# Patient Record
Sex: MALE | Race: WHITE | Employment: FULL TIME | URBAN - METROPOLITAN AREA
[De-identification: names, ages, dates, MRNs, and addresses within clinical notes are randomized per-mention and may not be internally consistent; named-entity substitution may affect disease eponyms.]

---

## 2017-01-04 ENCOUNTER — OFFICE VISIT (OUTPATIENT)
Dept: CARDIOLOGY CLINIC | Age: 57
End: 2017-01-04

## 2017-01-04 VITALS
BODY MASS INDEX: 36.06 KG/M2 | WEIGHT: 281 LBS | SYSTOLIC BLOOD PRESSURE: 102 MMHG | HEART RATE: 64 BPM | HEIGHT: 74 IN | DIASTOLIC BLOOD PRESSURE: 68 MMHG

## 2017-01-04 DIAGNOSIS — Z79.01 CHRONIC ANTICOAGULATION: ICD-10-CM

## 2017-01-04 DIAGNOSIS — I48.0 PAROXYSMAL ATRIAL FIBRILLATION (HCC): Primary | ICD-10-CM

## 2017-01-04 DIAGNOSIS — I49.5 SICK SINUS SYNDROME (HCC): ICD-10-CM

## 2017-01-04 DIAGNOSIS — Z95.0 PACEMAKER: ICD-10-CM

## 2017-01-04 NOTE — MR AVS SNAPSHOT
Visit Information Date & Time Provider Department Dept. Phone Encounter #  
 1/4/2017  9:20 AM Patric Baumgarten, MD [facility] Jett Goldman [phone] Follow-up Instructions Return in about 1 week (around 1/11/2017). Your Appointments 1/23/2017  3:30 PM  
PACEMAKER with PACEMAKER3, AREVALO [facility] (DANIEL SCHEDULING) Appt Note: 2 wk f/u afib ablation ECG please [address] [place]  
[address] [facility] [place]  
  
    
 1/23/2017  4:00 PM  
ESTABLISHED PATIENT with Patric Baumgarten, MD  
[facility] ([address]) Appt Note: 2 wk f/u afib ablation ECG please [address] [place]  
[address] [facility] [address]  
  
    
 3/1/2017 10:15 AM  
PACEMAKER with Arevalo Pipes [facility] (DANIEL SCHEDULING) Appt Note: mdt ppi, rc, thresh, CL auto  
 [address] [place]  
[phone]  
  
    
 6/21/2017 10:00 AM  
ESTABLISHED PATIENT with Claudette Arnold MD  
[facility] ([address]) Appt Note: 6 month per Dr. Ani Mortensen [address] [place] [address]  
[address] [facility] 7 [address] Upcoming Health Maintenance Date Due Hepatitis C Screening 1960 DTaP/Tdap/Td series (1 - Tdap) 6/26/1983 FOBT Q 1 YEAR AGE 50-75 6/26/2012 Allergies as of 1/4/2017  Review Complete On: 1/4/2017 By: Patric Baumgarten, MD  
  
 Severity Noted Reaction Type Reactions Silvadene [Silver Sulfadiazine]  01/30/2012    Swelling Current Immunizations  Reviewed on 7/26/2012 Name Date Influenza Vaccine (Quad) PF 11/23/2016  4:08 PM  
  
 Not reviewed this visit You Were Diagnosed With   
  
 Codes Comments Paroxysmal atrial fibrillation (HCC)    -  Primary ICD-10-CM: I48.0 ICD-9-CM: 427.31 Pacemaker     ICD-10-CM: Z95.0 ICD-9-CM: V45.01 Chronic anticoagulation     ICD-10-CM: Z79.01 
ICD-9-CM: V58.61 Sick sinus syndrome (HCC)     ICD-10-CM: I49.5 ICD-9-CM: 427.81 Vitals BP Pulse Height(growth percentile) Weight(growth percentile) BMI Smoking Status 102/68 (BP 1 Location: Left arm, BP Patient Position: Sitting) 64 6' 2\" (1.88 m) 281 lb (127.5 kg) 36.08 kg/m2 Never Smoker Vitals History BMI and BSA Data Body Mass Index Body Surface Area 36.08 kg/m 2 2.58 m 2 Preferred Pharmacy Pharmacy Name Phone [id number], [address] [facility] [phone] Your Updated Medication List  
  
   
This list is accurate as of: 1/4/17  9:53 AM.  Always use your most recent med list.  
  
  
  
  
 PRADAXA 150 mg capsule Generic drug:  dabigatran etexilate TAKE 1 CAP BY MOUTH EVERY TWELVE (12) HOURS.  
  
 sotalol 120 mg tablet Commonly known as:  Ira Fair Take 1 Tab by mouth every twelve (12) hours. TYLENOL EXTRA STRENGTH 500 mg tablet Generic drug:  acetaminophen Take 1,000 mg by mouth every six (6) hours as needed for Pain. Follow-up Instructions Return in about 1 week (around 1/11/2017). To-Do List   
 01/10/2017 8:15 AM  
  Appointment with [facility] ANESTHESIA; CATH ROOM 3 [facility] at [address] [place] [address] ([phone]) NPO AFTER MIDNIGHT! ROUTINE CASES:  Please arrive 2 hour prior to your scheduled appointment time. If your scheduled appointment is for 0730, 0800, 0815, please arrive by 0645. NON ROUTINE CASES:  PATIENTS WHO REQUIRE LABS, X-RAY, EKG, or MEDS:  PLEASE ARRIVE 3 HOURS PRIOR TO YOUR SCHEDULED APPOINTMENT.   If you require hydration prior to your procedure, PLEASE ARRIVE 4 HOURS PRIOR TO YOUR APPOINTMENT  **** IT IS THE OFFICE [address] NOTIFY THE CATH LAB SCHEDULAR IF THE PATIENT WILL REQUIRE ANY ADDITIONAL TIME FOR PREP FROM ROUTINE CASE ***** Patient Instructions Take all your medications as prescribed. Hold Pradaxa the morning of your procedure. Introducing [facility] SERVICES! Dear Nasrin Bacon: 
Thank you for requesting a [email or web address] account. Our records indicate that you already have an active [email or web address] account. You can access your account anytime at [email or web address] Did you know that you can access your hospital and ER discharge instructions at any time in [email or web address]? You can also review all of your test results from your hospital stay or ER visit. Additional Information If you have questions, please visit the Frequently Asked Questions section of the [email or web address] website at [email or web address] Remember, [email or web address] is NOT to be used for urgent needs. For medical emergencies, dial 911. Now available from your iPhone and Android! Please provide this summary of care documentation to your next provider. Your primary care clinician is listed as José Badillo. If you have any questions after today's visit, please call [phone].

## 2017-01-04 NOTE — PROGRESS NOTES
Cardiac Electrophysiology Office Note     Subjective:      Valentín Bustos is a 49 y.o. male patient who is seen for evaluation of atrial fibrillation. He is here today to discuss A fib ablation s/p hospital discharge. He was started on sotalol after  [facility] 11/21/16 in the hospital when cardizem IV could not control his rate. Since [facility] and d/c from hospital he says he has been doing well. No reported lightheadedness, dizziness or SOB. No blood in the stool or urine on Pradaxa. He felt better being out of AF  He wants to have repeated ablation    He is a patient of Dr Christian Lawrence and I had done pacemaker for sick sinus syndrome before and had done AF ablation for him 4 years ago   Admitted 11/21/16 for chest pain and stress test was normal   Pmhx includes atrial fibrillation with RVR, hx of atrial fibrillation ablation 7/25/2012 PAT, NSVT (5/4/16 on device check), SSS s/p pacemaker (Medtronic dual chamber pacemaker implant 8/17/2012), hx of unsuccessful [facility] 5/2012.      Previous cardiac testing  Echo 2012 LVEF 55 % to 60 %. No RWMA. Ventricular septum: There was a possible very small congenital defect in the perimembranous location. No significant valve abnormalities. Nuclear stress test 2/2012 no ischemia, LVEF 51%    Patient Active Problem List    Diagnosis Date Noted    SVT (supraventricular tachycardia) 11/22/2016    Pacemaker 08/31/2012    Sick sinus syndrome ([address] 08/17/2012    Cough 08/06/2012    Atelectasis 08/06/2012    Other dyspnea and respiratory abnormality 08/06/2012    Sinus bradycardia on ECG 08/06/2012    Dizziness 06/04/2012    Atrial fibrillation ([address] 04/30/2012     Current Outpatient Prescriptions   Medication Sig Dispense Refill    PRADAXA 150 mg capsule TAKE 1 CAP BY MOUTH EVERY TWELVE (12) HOURS. 60 Cap 6    sotalol (BETAPACE) 120 mg tablet Take 1 Tab by mouth every twelve (12) hours.  60 Tab 3    acetaminophen (TYLENOL EXTRA STRENGTH) 500 mg tablet Take 1,000 mg by mouth every six (6) hours as needed for Pain. Allergies   Allergen Reactions    Silvadene [Silver Sulfadiazine] Swelling     Past Medical History   Diagnosis Date    Atrial fibrillation [facility])     Pacemaker      Past Surgical History   Procedure Laterality Date    Hx wisdom teeth extraction       40 yrs ago    Donny echo color flow  7/25/2012          Ep study w/o induction  7/25/2012          Ablation, atrial fibrillation   7/25/2012          Pr intracardiac electrophysiologic 3d mapping  7/25/2012          Pr left heart cath by transeptal puncture  7/25/2012          Echo intracardiac  7/25/2012          Ins ppm/icd led dual only  8/17/2012          Hx pacemaker       Family History   Problem Relation Age of Onset    Hypertension Mother      Social History   Substance Use Topics    Smoking status: Never Smoker    Smokeless tobacco: Never Used    Alcohol use No        Review of Systems:   Constitutional: Negative for fever, chills, weight loss, malaise/fatigue. HEENT: Negative for nosebleeds, vision changes. Respiratory: Negative for cough, hemoptysis, sputum production, and wheezing. Cardiovascular: Negative for chest pain, palpitations, orthopnea, claudication, leg swelling, syncope, and PND. Gastrointestinal: Negative for nausea, vomiting, diarrhea, constipation, blood in stool and melena. Genitourinary: Negative for dysuria, and hematuria. Musculoskeletal: Negative for myalgias, arthralgia. Skin: Negative for rash. Heme: Does not bleed or bruise easily. Neurological: Negative for speech change and focal weakness     Objective:     Visit Vitals    /68 (BP 1 Location: Left arm, BP Patient Position: Sitting)    Pulse 64    Ht 6' 2\" (1.88 m)    Wt 281 lb (127.5 kg)    BMI 36.08 kg/m2      Physical Exam:   Constitutional: well-developed and well-nourished. No distress. Head: Normocephalic and atraumatic. Eyes: Pupils are equal, round  Neck: supple. No JVD present. Cardiovascular: Normal rate, regular rhythm. Exam reveals no gallop and no friction rub. No murmur heard. Pulmonary/Chest: Effort normal and breath sounds normal. No wheezes. left side   Abdominal: Soft, no tenderness. Musculoskeletal: no edema. Brace left ankle   Neurological: alert,oriented. Skin: Skin is warm and dry. Dr Adilia Gray skin   Psychiatric: normal mood and affect. Behavior is normal. Judgment and thought content normal.           Assessment/Plan:       ICD-10-CM ICD-9-CM    1. Paroxysmal atrial fibrillation (HCC) I48.0 427.31 AMB POC EKG ROUTINE W/ 12 LEADS, INTER & REP   2. Pacemaker Z95.0 V45.01    3. Chronic anticoagulation Z79.01 V58.61    4. Sick sinus syndrome ([address] I49.5 427.81      He has so far maintained normal sinus rhythm on sotalol   He had a atrial fibrillation ablation 2012 and did very well afterwards. Lately atrial fibrillation burden has been increasing on ppm and recent hospital admission for A fib with RVR. We had previously discussed a second atrial fibrillation in the hospital and today in the office. He would like to proceed, he will continue sotalol and pradaxa for the procedure. Stop ASA no hx of CAD, normal stress test.   Risks include but are not limited to bleeding in the groin, infection in the groin and/or heart valves, fistula between the groin arteries and veins, valvular damage, diaphragmatic paralysis, aortic dissection, heart attack, stroke, blood clot in the leg, pulmonary embolism, lung collapse (pneumothorax or hemothorax), heart collapse (pericardial tamponade), death. The added risks for left atrial ablation may be atrial-esophageal fistula, pulmonary vein stenoses, kidney failure (from contrast injection), higher risk of bleeding, stroke and heart attack. Elective or emergency surgery may be required to repair some of these complications. Prolonged hospitalization would be required.  General anesthesia and transeptal catheterization may be required for the procedure        Thank you for involving me in this patient's care and please call with further concerns or questions. Shawn Kat M.D.   Electrophysiology/Cardiology  [facility] [place]  [facility] 84, [facility] [address] [place], [place] [address]renetta Bain [address] [place] [address]  [phone]

## 2017-01-04 NOTE — PROGRESS NOTES
Chief Complaint   Patient presents with    Irregular Heart Beat     a-fib    SVT    Follow-up     4 week follow up     Denies chest pain, shortness of breath, and palpitations. Verified medications with the patient.

## 2017-01-06 RX ORDER — SODIUM CHLORIDE 0.9 % (FLUSH) 0.9 %
5-10 SYRINGE (ML) INJECTION AS NEEDED
Status: CANCELLED | OUTPATIENT
Start: 2017-01-06

## 2017-01-06 RX ORDER — SODIUM CHLORIDE 0.9 % (FLUSH) 0.9 %
5-10 SYRINGE (ML) INJECTION EVERY 8 HOURS
Status: CANCELLED | OUTPATIENT
Start: 2017-01-06

## 2017-01-10 ENCOUNTER — ANESTHESIA (OUTPATIENT)
Dept: NON INVASIVE DIAGNOSTICS | Age: 57
End: 2017-01-10
Payer: COMMERCIAL

## 2017-01-10 ENCOUNTER — HOSPITAL ENCOUNTER (OUTPATIENT)
Dept: NON INVASIVE DIAGNOSTICS | Age: 57
Discharge: HOME | End: 2017-01-11
Attending: INTERNAL MEDICINE | Admitting: INTERNAL MEDICINE
Payer: COMMERCIAL

## 2017-01-10 ENCOUNTER — ANESTHESIA EVENT (OUTPATIENT)
Dept: NON INVASIVE DIAGNOSTICS | Age: 57
End: 2017-01-10
Payer: COMMERCIAL

## 2017-01-10 PROBLEM — Z86.79 S/P ABLATION OF ATRIAL FIBRILLATION: Status: ACTIVE | Noted: 2017-01-10

## 2017-01-10 PROBLEM — Z98.890 S/P ABLATION OF ATRIAL FIBRILLATION: Status: ACTIVE | Noted: 2017-01-10

## 2017-01-10 LAB
ABO + RH BLD: NORMAL
ACT BLD: 147 SECS (ref 79–138)
ACT BLD: 234 SECS (ref 79–138)
ACT BLD: 255 SECS (ref 79–138)
ACT BLD: 260 SECS (ref 79–138)
ATRIAL RATE: 70 BPM
BLOOD GROUP ANTIBODIES SERPL: NORMAL
CALCULATED P AXIS, ECG09: 71 DEGREES
CALCULATED R AXIS, ECG10: 23 DEGREES
CALCULATED T AXIS, ECG11: 46 DEGREES
DIAGNOSIS, 93000: NORMAL
P-R INTERVAL, ECG05: 182 MS
Q-T INTERVAL, ECG07: 414 MS
QRS DURATION, ECG06: 88 MS
QTC CALCULATION (BEZET), ECG08: 447 MS
SPECIMEN EXP DATE BLD: NORMAL
VENTRICULAR RATE, ECG03: 70 BPM

## 2017-01-10 PROCEDURE — 93005 ELECTROCARDIOGRAM TRACING: CPT

## 2017-01-10 PROCEDURE — 74011250636 HC RX REV CODE- 250/636: Performed by: INTERNAL MEDICINE

## 2017-01-10 PROCEDURE — C1730 CATH, EP, 19 OR FEW ELECT: HCPCS

## 2017-01-10 PROCEDURE — 77030008684 HC TU ET CUF COVD -B: Performed by: ANESTHESIOLOGY

## 2017-01-10 PROCEDURE — C1894 INTRO/SHEATH, NON-LASER: HCPCS

## 2017-01-10 PROCEDURE — 77030020506 HC NDL TRNSPTL NRG BAYL -F

## 2017-01-10 PROCEDURE — 77030026438 HC STYL ET INTUB CARD -A: Performed by: ANESTHESIOLOGY

## 2017-01-10 PROCEDURE — 74011000258 HC RX REV CODE- 258

## 2017-01-10 PROCEDURE — 93656 COMPRE EP EVAL ABLTJ ATR FIB: CPT

## 2017-01-10 PROCEDURE — 85347 COAGULATION TIME ACTIVATED: CPT

## 2017-01-10 PROCEDURE — 77030030806 HC PTCH ENSIT NAVX STJU -G

## 2017-01-10 PROCEDURE — 76060000037 HC ANESTHESIA 3 TO 3.5 HR

## 2017-01-10 PROCEDURE — 77030033352 HC TBNG IRR PMP COOL PNT STJU -B

## 2017-01-10 PROCEDURE — 77030018729 HC ELECTRD DEFIB PAD CARD -B

## 2017-01-10 PROCEDURE — 77030016899 HC CBL EP EXT4 BSC -B

## 2017-01-10 PROCEDURE — 36415 COLL VENOUS BLD VENIPUNCTURE: CPT | Performed by: INTERNAL MEDICINE

## 2017-01-10 PROCEDURE — 86900 BLOOD TYPING SEROLOGIC ABO: CPT | Performed by: INTERNAL MEDICINE

## 2017-01-10 PROCEDURE — 77030013797 HC KT TRNSDUC PRSSR EDWD -A

## 2017-01-10 PROCEDURE — C1732 CATH, EP, DIAG/ABL, 3D/VECT: HCPCS

## 2017-01-10 PROCEDURE — C1751 CATH, INF, PER/CENT/MIDLINE: HCPCS | Performed by: ANESTHESIOLOGY

## 2017-01-10 PROCEDURE — 77030029065 HC DRSG HEMO QCLOT ZMED -B

## 2017-01-10 PROCEDURE — 77030019908 HC STETH ESOPH SIMS -A: Performed by: ANESTHESIOLOGY

## 2017-01-10 PROCEDURE — 93613 INTRACARDIAC EPHYS 3D MAPG: CPT

## 2017-01-10 PROCEDURE — 74011000250 HC RX REV CODE- 250

## 2017-01-10 PROCEDURE — C1893 INTRO/SHEATH, FIXED,NON-PEEL: HCPCS

## 2017-01-10 PROCEDURE — 74011250637 HC RX REV CODE- 250/637: Performed by: NURSE PRACTITIONER

## 2017-01-10 PROCEDURE — C1731 CATH, EP, 20 OR MORE ELEC: HCPCS

## 2017-01-10 PROCEDURE — 77030020508 HC PD GRND GENRTR BAYL -A

## 2017-01-10 PROCEDURE — 77030016898 HC CBL EP EXT3 STJU -B

## 2017-01-10 PROCEDURE — 77030034850

## 2017-01-10 PROCEDURE — 93662 INTRACARDIAC ECG (ICE): CPT

## 2017-01-10 PROCEDURE — 74011250636 HC RX REV CODE- 250/636

## 2017-01-10 PROCEDURE — C1766 INTRO/SHEATH,STRBLE,NON-PEEL: HCPCS

## 2017-01-10 RX ORDER — SODIUM CHLORIDE 0.9 % (FLUSH) 0.9 %
5-10 SYRINGE (ML) INJECTION AS NEEDED
Status: DISCONTINUED | OUTPATIENT
Start: 2017-01-10 | End: 2017-01-10

## 2017-01-10 RX ORDER — SODIUM CHLORIDE 9 MG/ML
INJECTION, SOLUTION INTRAVENOUS
Status: DISCONTINUED | OUTPATIENT
Start: 2017-01-10 | End: 2017-01-10 | Stop reason: HOSPADM

## 2017-01-10 RX ORDER — SODIUM CHLORIDE 0.9 % (FLUSH) 0.9 %
5-10 SYRINGE (ML) INJECTION EVERY 8 HOURS
Status: DISCONTINUED | OUTPATIENT
Start: 2017-01-10 | End: 2017-01-11 | Stop reason: HOSPADM

## 2017-01-10 RX ORDER — SODIUM CHLORIDE, SODIUM LACTATE, POTASSIUM CHLORIDE, CALCIUM CHLORIDE 600; 310; 30; 20 MG/100ML; MG/100ML; MG/100ML; MG/100ML
INJECTION, SOLUTION INTRAVENOUS
Status: DISCONTINUED | OUTPATIENT
Start: 2017-01-10 | End: 2017-01-10 | Stop reason: HOSPADM

## 2017-01-10 RX ORDER — SODIUM CHLORIDE 9 MG/ML
25 INJECTION, SOLUTION INTRAVENOUS CONTINUOUS
Status: DISCONTINUED | OUTPATIENT
Start: 2017-01-10 | End: 2017-01-10

## 2017-01-10 RX ORDER — HEPARIN SODIUM 200 [USP'U]/100ML
1000 INJECTION, SOLUTION INTRAVENOUS CONTINUOUS
Status: DISCONTINUED | OUTPATIENT
Start: 2017-01-10 | End: 2017-01-10

## 2017-01-10 RX ORDER — SODIUM CHLORIDE 0.9 % (FLUSH) 0.9 %
5 SYRINGE (ML) INJECTION AS NEEDED
Status: DISCONTINUED | OUTPATIENT
Start: 2017-01-10 | End: 2017-01-10 | Stop reason: HOSPADM

## 2017-01-10 RX ORDER — HYDROMORPHONE HYDROCHLORIDE 1 MG/ML
0.2 INJECTION, SOLUTION INTRAMUSCULAR; INTRAVENOUS; SUBCUTANEOUS
Status: DISCONTINUED | OUTPATIENT
Start: 2017-01-10 | End: 2017-01-10

## 2017-01-10 RX ORDER — ROPIVACAINE HYDROCHLORIDE 5 MG/ML
30 INJECTION, SOLUTION EPIDURAL; INFILTRATION; PERINEURAL
Status: DISCONTINUED | OUTPATIENT
Start: 2017-01-10 | End: 2017-01-10

## 2017-01-10 RX ORDER — SODIUM CHLORIDE, SODIUM LACTATE, POTASSIUM CHLORIDE, CALCIUM CHLORIDE 600; 310; 30; 20 MG/100ML; MG/100ML; MG/100ML; MG/100ML
75 INJECTION, SOLUTION INTRAVENOUS CONTINUOUS
Status: DISCONTINUED | OUTPATIENT
Start: 2017-01-10 | End: 2017-01-10

## 2017-01-10 RX ORDER — DABIGATRAN ETEXILATE 150 MG/1
150 CAPSULE ORAL 2 TIMES DAILY
Status: DISCONTINUED | OUTPATIENT
Start: 2017-01-11 | End: 2017-01-11 | Stop reason: HOSPADM

## 2017-01-10 RX ORDER — ADENOSINE 3 MG/ML
6 INJECTION, SOLUTION INTRAVENOUS ONCE
Status: DISCONTINUED | OUTPATIENT
Start: 2017-01-10 | End: 2017-01-10

## 2017-01-10 RX ORDER — DIPHENHYDRAMINE HYDROCHLORIDE 50 MG/ML
12.5 INJECTION, SOLUTION INTRAMUSCULAR; INTRAVENOUS AS NEEDED
Status: DISCONTINUED | OUTPATIENT
Start: 2017-01-10 | End: 2017-01-10

## 2017-01-10 RX ORDER — FENTANYL CITRATE 50 UG/ML
50 INJECTION, SOLUTION INTRAMUSCULAR; INTRAVENOUS AS NEEDED
Status: DISCONTINUED | OUTPATIENT
Start: 2017-01-10 | End: 2017-01-10

## 2017-01-10 RX ORDER — SODIUM CHLORIDE, SODIUM LACTATE, POTASSIUM CHLORIDE, CALCIUM CHLORIDE 600; 310; 30; 20 MG/100ML; MG/100ML; MG/100ML; MG/100ML
125 INJECTION, SOLUTION INTRAVENOUS CONTINUOUS
Status: DISCONTINUED | OUTPATIENT
Start: 2017-01-10 | End: 2017-01-10

## 2017-01-10 RX ORDER — SODIUM CHLORIDE 0.9 % (FLUSH) 0.9 %
5-10 SYRINGE (ML) INJECTION AS NEEDED
Status: DISCONTINUED | OUTPATIENT
Start: 2017-01-10 | End: 2017-01-11 | Stop reason: HOSPADM

## 2017-01-10 RX ORDER — HEPARIN SODIUM 10000 [USP'U]/100ML
18-36 INJECTION, SOLUTION INTRAVENOUS CONTINUOUS
Status: DISCONTINUED | OUTPATIENT
Start: 2017-01-10 | End: 2017-01-10

## 2017-01-10 RX ORDER — HYDROCODONE BITARTRATE AND ACETAMINOPHEN 5; 325 MG/1; MG/1
1 TABLET ORAL
Status: DISCONTINUED | OUTPATIENT
Start: 2017-01-10 | End: 2017-01-11 | Stop reason: HOSPADM

## 2017-01-10 RX ORDER — LIDOCAINE HYDROCHLORIDE 10 MG/ML
0.1 INJECTION, SOLUTION EPIDURAL; INFILTRATION; INTRACAUDAL; PERINEURAL AS NEEDED
Status: DISCONTINUED | OUTPATIENT
Start: 2017-01-10 | End: 2017-01-10

## 2017-01-10 RX ORDER — MIDAZOLAM HYDROCHLORIDE 1 MG/ML
0.5 INJECTION, SOLUTION INTRAMUSCULAR; INTRAVENOUS
Status: DISCONTINUED | OUTPATIENT
Start: 2017-01-10 | End: 2017-01-10

## 2017-01-10 RX ORDER — ACETAMINOPHEN 500 MG
500 TABLET ORAL
Status: DISCONTINUED | OUTPATIENT
Start: 2017-01-10 | End: 2017-01-10

## 2017-01-10 RX ORDER — NALOXONE HYDROCHLORIDE 0.4 MG/ML
0.4 INJECTION, SOLUTION INTRAMUSCULAR; INTRAVENOUS; SUBCUTANEOUS AS NEEDED
Status: DISCONTINUED | OUTPATIENT
Start: 2017-01-10 | End: 2017-01-11 | Stop reason: HOSPADM

## 2017-01-10 RX ORDER — SODIUM CHLORIDE 0.9 % (FLUSH) 0.9 %
5-10 SYRINGE (ML) INJECTION AS NEEDED
Status: DISCONTINUED | OUTPATIENT
Start: 2017-01-10 | End: 2017-01-10 | Stop reason: HOSPADM

## 2017-01-10 RX ORDER — SODIUM CHLORIDE 0.9 % (FLUSH) 0.9 %
5-10 SYRINGE (ML) INJECTION EVERY 8 HOURS
Status: DISCONTINUED | OUTPATIENT
Start: 2017-01-10 | End: 2017-01-10 | Stop reason: HOSPADM

## 2017-01-10 RX ORDER — FENTANYL CITRATE 50 UG/ML
25 INJECTION, SOLUTION INTRAMUSCULAR; INTRAVENOUS
Status: DISCONTINUED | OUTPATIENT
Start: 2017-01-10 | End: 2017-01-10

## 2017-01-10 RX ORDER — MIDAZOLAM HYDROCHLORIDE 1 MG/ML
1 INJECTION, SOLUTION INTRAMUSCULAR; INTRAVENOUS AS NEEDED
Status: DISCONTINUED | OUTPATIENT
Start: 2017-01-10 | End: 2017-01-10

## 2017-01-10 RX ORDER — PROPOFOL 10 MG/ML
INJECTION, EMULSION INTRAVENOUS AS NEEDED
Status: DISCONTINUED | OUTPATIENT
Start: 2017-01-10 | End: 2017-01-10 | Stop reason: HOSPADM

## 2017-01-10 RX ORDER — ACETAMINOPHEN 325 MG/1
650 TABLET ORAL
Status: DISCONTINUED | OUTPATIENT
Start: 2017-01-10 | End: 2017-01-11 | Stop reason: HOSPADM

## 2017-01-10 RX ORDER — SUCCINYLCHOLINE CHLORIDE 20 MG/ML
INJECTION INTRAMUSCULAR; INTRAVENOUS AS NEEDED
Status: DISCONTINUED | OUTPATIENT
Start: 2017-01-10 | End: 2017-01-10 | Stop reason: HOSPADM

## 2017-01-10 RX ORDER — HEPARIN SODIUM 1000 [USP'U]/ML
5000 INJECTION, SOLUTION INTRAVENOUS; SUBCUTANEOUS
Status: DISCONTINUED | OUTPATIENT
Start: 2017-01-10 | End: 2017-01-10

## 2017-01-10 RX ORDER — SOTALOL HYDROCHLORIDE 80 MG/1
120 TABLET ORAL EVERY 12 HOURS
Status: DISCONTINUED | OUTPATIENT
Start: 2017-01-10 | End: 2017-01-11 | Stop reason: HOSPADM

## 2017-01-10 RX ORDER — SODIUM CHLORIDE 0.9 % (FLUSH) 0.9 %
5-10 SYRINGE (ML) INJECTION EVERY 8 HOURS
Status: DISCONTINUED | OUTPATIENT
Start: 2017-01-10 | End: 2017-01-10

## 2017-01-10 RX ORDER — HEPARIN SODIUM 1000 [USP'U]/ML
INJECTION, SOLUTION INTRAVENOUS; SUBCUTANEOUS AS NEEDED
Status: DISCONTINUED | OUTPATIENT
Start: 2017-01-10 | End: 2017-01-10 | Stop reason: HOSPADM

## 2017-01-10 RX ORDER — ROCURONIUM BROMIDE 10 MG/ML
INJECTION, SOLUTION INTRAVENOUS AS NEEDED
Status: DISCONTINUED | OUTPATIENT
Start: 2017-01-10 | End: 2017-01-10 | Stop reason: HOSPADM

## 2017-01-10 RX ORDER — MORPHINE SULFATE 10 MG/ML
2 INJECTION, SOLUTION INTRAMUSCULAR; INTRAVENOUS
Status: DISCONTINUED | OUTPATIENT
Start: 2017-01-10 | End: 2017-01-10

## 2017-01-10 RX ORDER — SODIUM CHLORIDE 9 MG/ML
INJECTION, SOLUTION INTRAVENOUS
Status: DISCONTINUED
Start: 2017-01-10 | End: 2017-01-10

## 2017-01-10 RX ORDER — ATROPINE SULFATE 0.1 MG/ML
0.5 INJECTION INTRAVENOUS AS NEEDED
Status: DISCONTINUED | OUTPATIENT
Start: 2017-01-10 | End: 2017-01-10

## 2017-01-10 RX ADMIN — PROPOFOL 300 MG: 10 INJECTION, EMULSION INTRAVENOUS at 08:18

## 2017-01-10 RX ADMIN — SODIUM CHLORIDE: 9 INJECTION, SOLUTION INTRAVENOUS at 08:10

## 2017-01-10 RX ADMIN — SUCCINYLCHOLINE CHLORIDE 180 MG: 20 INJECTION INTRAMUSCULAR; INTRAVENOUS at 08:18

## 2017-01-10 RX ADMIN — HEPARIN SODIUM 13000 UNITS: 1000 INJECTION, SOLUTION INTRAVENOUS; SUBCUTANEOUS at 09:28

## 2017-01-10 RX ADMIN — ROCURONIUM BROMIDE 10 MG: 10 INJECTION, SOLUTION INTRAVENOUS at 08:18

## 2017-01-10 RX ADMIN — Medication 10 ML: at 14:41

## 2017-01-10 RX ADMIN — HEPARIN SODIUM 2000 UNITS: 1000 INJECTION, SOLUTION INTRAVENOUS; SUBCUTANEOUS at 10:25

## 2017-01-10 RX ADMIN — HEPARIN SODIUM 18 UNITS/KG/HR: 10000 INJECTION, SOLUTION INTRAVENOUS at 09:36

## 2017-01-10 RX ADMIN — SODIUM CHLORIDE, SODIUM LACTATE, POTASSIUM CHLORIDE, CALCIUM CHLORIDE: 600; 310; 30; 20 INJECTION, SOLUTION INTRAVENOUS at 08:14

## 2017-01-10 RX ADMIN — SOTALOL HYDROCHLORIDE 120 MG: 80 TABLET ORAL at 20:46

## 2017-01-10 RX ADMIN — PROPOFOL 50 MG: 10 INJECTION, EMULSION INTRAVENOUS at 11:23

## 2017-01-10 NOTE — PROCEDURES
Cardiac Procedure Note   Patient: Jazz Dyer  MRN: [id number]  SSN: [id number]   YOB: 1960 Age: 49 y.o. Sex: male    Date of Procedure: 1/10/2017   Pre-procedure Diagnosis: PAF post ablation of persistent AF in 2012 and cardioversion of PAF in 11/2016. Sick sinus syndrome, medtronic pacemaker  Post-procedure Diagnosis: same  Procedure: EP Study and Ablation PVI  :  Dr. Varun Crum MD    Assistant(s):  None  Anesthesia: general anesthesia  Estimated Blood Loss: Less than 10 mL from groins  Specimens Removed: None  Findings: all 4 pulmonary veins remain isolated with exit and blocks but there are viable tissues at the angel between the left inferior, superior vein and SALLY and inferior lips of left inferior PV anteriorly and posteriorly so ablations were done here  There is a small viable tissue in the anterior right superior PV and this was ablated  He had CFAE ablation in 2012.   There are fractionated EGMs at the base of SALLY and this was also ablated  Complications: None   Implants:  None  Dual chamber Medtronic pacer was checked with pacing threshold 0.5 V @ 0.4 ms and programmed to MVP R 70 -130 bpm  Signed by:  Varun Crum MD  1/10/2017  12:38 PM

## 2017-01-10 NOTE — PROGRESS NOTES
Occupational Therapy Screening:  Services are not indicated at this time. [facility] triggered for occupational therapy based on results obtained during the nursing admission assessment. The patients chart was reviewed and the patient is not appropriate for a skilled therapy evaluation at this time. Please consult occupational therapy if any therapy needs arise. Thank you.     Kylie Newell OT

## 2017-01-10 NOTE — ANESTHESIA POSTPROCEDURE EVALUATION
Post-Anesthesia Evaluation and Assessment    Patient: Celi Aguirre MRN: [id number]  SSN: [id number]    YOB: 1960  Age: 49 y.o. Sex: male       Cardiovascular Function/Vital Signs  Visit Vitals    /87    Pulse 71    Temp 36.6 °C (97.8 °F)    Resp 16    Ht 6' 2\" (1.88 m)    Wt 129.7 kg (286 lb)    SpO2 100%    BMI 36.72 kg/m2       Patient is status post general anesthesia for * No procedures listed *. Nausea/Vomiting: None    Postoperative hydration reviewed and adequate. Pain:  Pain Scale 1: Numeric (0 - 10) (01/10/17 1155)  Pain Intensity 1: 0 (01/10/17 1155)   Managed    Neurological Status:   Neuro  Neurologic State: Drowsy (01/10/17 1155)   At baseline    Mental Status and Level of Consciousness: Arousable    Pulmonary Status:   O2 Device: Room air (01/10/17 1201)   Adequate oxygenation and airway patent    Complications related to anesthesia: None    Post-anesthesia assessment completed.  No concerns    Signed By: Marylen Lat, MD     January 10, 2017

## 2017-01-10 NOTE — PROGRESS NOTES
Cardiac Cath Lab Recovery Arrival Note:      Margret Childers arrived to Cardiac Cath Lab, Recovery Area. Staff introduced to patient. Patient identifiers verified with NAME and DATE OF BIRTH. Procedure verified with patient. Consent forms reviewed and signed by patient or authorized representative and verified. Allergies verified. Patient informed of procedure and plan of care. Questions answered with review. Patient prepped for procedure, per orders from physician, prior to arrival.    Patient on cardiac monitor, non-invasive blood pressure, SPO2 monitor. Patient is A&Ox 4. Patient reports no pain, no chest pain, no n/v. Patient in stretcher, in low position, with side rails up, call bell within reach, patient instructed to call of assistance as needed. Patient prep in: [facility] Recovery Area, Bed# 6. Family in: Brother: Arlene Sy .    Prep by: Ravinder Haas RN

## 2017-01-10 NOTE — INTERVAL H&P NOTE
H&P Update:  Brain Emilee was seen and examined. History and physical has been reviewed. The patient has been examined.  There have been no significant clinical changes since the completion of the originally dated History and Physical.    Signed By: Ofelia Reinoso MD     January 10, 2017 7:59 AM

## 2017-01-10 NOTE — PROGRESS NOTES
Cardiac Cath Lab Procedure Area Arrival Note:    Junito Poole arrived to Cardiac Cath Lab, Procedure Area. Patient identifiers verified with NAME and DATE OF BIRTH. Procedure verified with patient. Consent forms verified. Allergies verified. Patient informed of procedure and plan of care. Questions answered with review. Patient voiced understanding of procedure and plan of care. Patient on cardiac monitor, non-invasive blood pressure, SPO2 monitor. On roomair or O2 @ 2 lpm via NC.  IV of 0.9NS on pump at 25 ml/hr. Patient status doing well without problems. Patient is A&Ox 3. Patient reports no discomfort. Patient medicated during procedure with orders obtained and verified by Dr. Kurt Johnston. Refer to patients Cardiac Cath Lab PROCEDURE REPORT for vital signs, assessment, status, and response during procedure, printed at end of case. Printed report on chart or scanned into chart.

## 2017-01-10 NOTE — H&P (VIEW-ONLY)
Cardiac Electrophysiology Office Note     Subjective:      Junito Poole is a 49 y.o. male patient who is seen for evaluation of atrial fibrillation. He is here today to discuss A fib ablation s/p hospital discharge. He was started on sotalol after  [facility] 11/21/16 in the hospital when cardizem IV could not control his rate. Since [facility] and d/c from hospital he says he has been doing well. No reported lightheadedness, dizziness or SOB. No blood in the stool or urine on Pradaxa. He felt better being out of AF  He wants to have repeated ablation    He is a patient of Dr Maggi Reeder and I had done pacemaker for sick sinus syndrome before and had done AF ablation for him 4 years ago   Admitted 11/21/16 for chest pain and stress test was normal   Pmhx includes atrial fibrillation with RVR, hx of atrial fibrillation ablation 7/25/2012 PAT, NSVT (5/4/16 on device check), SSS s/p pacemaker (Medtronic dual chamber pacemaker implant 8/17/2012), hx of unsuccessful [facility] 5/2012.      Previous cardiac testing  Echo 2012 LVEF 55 % to 60 %. No RWMA. Ventricular septum: There was a possible very small congenital defect in the perimembranous location. No significant valve abnormalities. Nuclear stress test 2/2012 no ischemia, LVEF 51%    Patient Active Problem List    Diagnosis Date Noted    SVT (supraventricular tachycardia) 11/22/2016    Pacemaker 08/31/2012    Sick sinus syndrome ([address] 08/17/2012    Cough 08/06/2012    Atelectasis 08/06/2012    Other dyspnea and respiratory abnormality 08/06/2012    Sinus bradycardia on ECG 08/06/2012    Dizziness 06/04/2012    Atrial fibrillation ([address] 04/30/2012     Current Outpatient Prescriptions   Medication Sig Dispense Refill    PRADAXA 150 mg capsule TAKE 1 CAP BY MOUTH EVERY TWELVE (12) HOURS. 60 Cap 6    sotalol (BETAPACE) 120 mg tablet Take 1 Tab by mouth every twelve (12) hours.  60 Tab 3    acetaminophen (TYLENOL EXTRA STRENGTH) 500 mg tablet Take 1,000 mg by mouth every six (6) hours as needed for Pain. Allergies   Allergen Reactions    Silvadene [Silver Sulfadiazine] Swelling     Past Medical History   Diagnosis Date    Atrial fibrillation [facility])     Pacemaker      Past Surgical History   Procedure Laterality Date    Hx wisdom teeth extraction       40 yrs ago    Donny echo color flow  7/25/2012          Ep study w/o induction  7/25/2012          Ablation, atrial fibrillation   7/25/2012          Pr intracardiac electrophysiologic 3d mapping  7/25/2012          Pr left heart cath by transeptal puncture  7/25/2012          Echo intracardiac  7/25/2012          Ins ppm/icd led dual only  8/17/2012          Hx pacemaker       Family History   Problem Relation Age of Onset    Hypertension Mother      Social History   Substance Use Topics    Smoking status: Never Smoker    Smokeless tobacco: Never Used    Alcohol use No        Review of Systems:   Constitutional: Negative for fever, chills, weight loss, malaise/fatigue. HEENT: Negative for nosebleeds, vision changes. Respiratory: Negative for cough, hemoptysis, sputum production, and wheezing. Cardiovascular: Negative for chest pain, palpitations, orthopnea, claudication, leg swelling, syncope, and PND. Gastrointestinal: Negative for nausea, vomiting, diarrhea, constipation, blood in stool and melena. Genitourinary: Negative for dysuria, and hematuria. Musculoskeletal: Negative for myalgias, arthralgia. Skin: Negative for rash. Heme: Does not bleed or bruise easily. Neurological: Negative for speech change and focal weakness     Objective:     Visit Vitals    /68 (BP 1 Location: Left arm, BP Patient Position: Sitting)    Pulse 64    Ht 6' 2\" (1.88 m)    Wt 281 lb (127.5 kg)    BMI 36.08 kg/m2      Physical Exam:   Constitutional: well-developed and well-nourished. No distress. Head: Normocephalic and atraumatic. Eyes: Pupils are equal, round  Neck: supple. No JVD present. Cardiovascular: Normal rate, regular rhythm. Exam reveals no gallop and no friction rub. No murmur heard. Pulmonary/Chest: Effort normal and breath sounds normal. No wheezes. left side   Abdominal: Soft, no tenderness. Musculoskeletal: no edema. Brace left ankle   Neurological: alert,oriented. Skin: Skin is warm and dry. Dr Oswaldo Kyle skin   Psychiatric: normal mood and affect. Behavior is normal. Judgment and thought content normal.           Assessment/Plan:       ICD-10-CM ICD-9-CM    1. Paroxysmal atrial fibrillation (HCC) I48.0 427.31 AMB POC EKG ROUTINE W/ 12 LEADS, INTER & REP   2. Pacemaker Z95.0 V45.01    3. Chronic anticoagulation Z79.01 V58.61    4. Sick sinus syndrome ([address] I49.5 427.81      He has so far maintained normal sinus rhythm on sotalol   He had a atrial fibrillation ablation 2012 and did very well afterwards. Lately atrial fibrillation burden has been increasing on ppm and recent hospital admission for A fib with RVR. We had previously discussed a second atrial fibrillation in the hospital and today in the office. He would like to proceed, he will continue sotalol and pradaxa for the procedure. Stop ASA no hx of CAD, normal stress test.   Risks include but are not limited to bleeding in the groin, infection in the groin and/or heart valves, fistula between the groin arteries and veins, valvular damage, diaphragmatic paralysis, aortic dissection, heart attack, stroke, blood clot in the leg, pulmonary embolism, lung collapse (pneumothorax or hemothorax), heart collapse (pericardial tamponade), death. The added risks for left atrial ablation may be atrial-esophageal fistula, pulmonary vein stenoses, kidney failure (from contrast injection), higher risk of bleeding, stroke and heart attack. Elective or emergency surgery may be required to repair some of these complications. Prolonged hospitalization would be required.  General anesthesia and transeptal catheterization may be required for the procedure        Thank you for involving me in this patient's care and please call with further concerns or questions. Rebecca Brady M.D.   Electrophysiology/Cardiology  [facility] [place]  [facility] 84, [facility] [address] [place] [address] [place] [address] [place] [address]  [phone]

## 2017-01-10 NOTE — ANESTHESIA PROCEDURE NOTES
Arterial Line Placement    Start time: 1/10/2017 8:21 AM  End time: 1/10/2017 8:24 AM  Performed by: Cindi Cruz  Authorized by: Cindi Cruz     Pre-Procedure  Indications:   Other (comment) and arterial pressure monitoring  Preanesthetic Checklist: patient identified, risks and benefits discussed, anesthesia consent, site marked, patient being monitored, timeout performed and patient being monitored    Timeout Time: 08:21        Procedure:   Prep:  Chlorhexidine  Seldinger Technique?: Yes    Orientation:  Right  Location:  Radial artery  Catheter size:  20 G  Number of attempts:  1  Cont Cardiac Output Sensor: No      Assessment:   Post-procedure:  Line secured and sterile dressing applied  Patient Tolerance:  Patient tolerated the procedure well with no immediate complications

## 2017-01-10 NOTE — PROGRESS NOTES
1350 TRANSFER - IN REPORT:    Verbal report received from Colleen Wells [facility] (name) on Jose Kramer  being received from CCL (unit) for routine progression of care      Report consisted of patients Situation, Background, Assessment and   Recommendations(SBAR). Information from the following report(s) SBAR, Kardex, Procedure Summary, Intake/Output, MAR and Recent Results was reviewed with the receiving nurse. Opportunity for questions and clarification was provided. Assessment completed upon patients arrival to unit and care assumed. 1425 Pt arrived to [facility]. Tele placed and verified with MT.  VSS, assessment complete, R and L groin sites, and R radial site s/p ablation no bleeding, no hematoma, dry/intact. Pt with out c/o pain. 1930 Bedside shift change report given to Chitra Baker RN (oncoming nurse) by Isha Augustine RN (offgoing nurse). Report included the following information SBAR, Kardex, Procedure Summary, Intake/Output, MAR, Recent Results and Med Rec Status.

## 2017-01-10 NOTE — PROGRESS NOTES
TRANSFER - IN REPORT:    Verbal report received from ANA Romeo on 5726 Alessandra Burns, Procedure EPS/Ablation , from the Cardiac Cath lab, for routine progression of care. Report consisted of patients Situation, Background, Assessment and Recommendations(SBAR). Information from the following report(s) Procedure Summary, MAR and Recent Results was reviewed with the receiving clinician. Opportunity for questions and clarification was provided. Assessment completed upon patients arrival to [address] [place] [address] and care [facility]. Cardiac Cath Lab Recovery Arrival Note:     57Rachana Burns arrived to [facility] recovery area. Patient procedure= EPS/Ablation. Patient on cardiac monitor, non-invasive blood pressure, Patient status doing well without problems. Patient is Arousablex4. Patient reports no pain, no chest pain. Procedure site without any bleeding and no hematoma.

## 2017-01-10 NOTE — IP AVS SNAPSHOT
[address] 
[phone] Patient: Maury Jane MRN: [id number] :1960 You are allergic to the following Allergen Reactions Silvadene (Silver Sulfadiazine) Swelling Recent Documentation Height Weight BMI Smoking Status 1.88 m 129.3 kg 36.6 kg/m2 Never Smoker Emergency Contacts Name Discharge Info Relation Home Work Mobile [address] [facility] CAREGIVER [3] Mother [14] [phone] About your hospitalization You were admitted on:  January 10, 2017 You last received care in the:  [facility] 4 CV SERVICES UNIT You were discharged on:  2017 Unit phone number:  [phone] Why you were hospitalized Your primary diagnosis was:  S/P Ablation Of Atrial Fibrillation Your diagnoses also included:  Paf (Paroxysmal Atrial Fibrillation) (Hcc), Sick Sinus Syndrome (Hcc) Providers Seen During Your Hospitalizations Provider Role Specialty Primary office phone Kaitlynn Gonzalez MD Attending Provider Cardiology [phone] Your Primary Care Physician (PCP) Primary Care Physician Office Phone Office Fax Kiera Kylee [phone] Follow-up Information Follow up With Details Comments Contact Info Kaitlynn Gonzalez MD On 2017 [facility] [address] [place] 
[phone] Kyle Harden MD   [address] [place] [address] [address] [address] [phone] Your Appointments 2017  3:30 PM EST  
PACEMAKER with PACEMAKER3, Alok Negrete [facility] ([facility]EL [facility]) [address] [place]  
[phone] 2017  4:00 PM EST  
ESTABLISHED PATIENT with Kaitlynn Gonzalez MD  
[facility] ([address] [place] [address]) [address] [facility] [address] [phone] Current Discharge Medication List  
  
CONTINUE these medications which have NOT CHANGED Dose & Instructions Dispensing Information Comments Morning Noon Evening Bedtime PRADAXA 150 mg capsule Generic drug:  dabigatran etexilate Your next dose is: Today, Tomorrow Other:  _________ TAKE 1 CAP BY MOUTH EVERY TWELVE (12) HOURS. Quantity:  60 Cap Refills:  6  
     
   
   
   
  
 sotalol 120 mg tablet Commonly known as:  Yasmine Ricks Your next dose is: Today, Tomorrow Other:  _________ Dose:  120 mg Take 1 Tab by mouth every twelve (12) hours. Quantity:  60 Tab Refills:  3  
     
   
   
   
  
 TYLENOL EXTRA STRENGTH 500 mg tablet Generic drug:  acetaminophen Your next dose is: Today, Tomorrow Other:  _________ Dose:  1000 mg Take 1,000 mg by mouth every six (6) hours as needed for Pain. Refills:  0 Discharge Instructions Patient Instructions Post-EP study and ablation 1. No heavy lifting or exercises for 1 week. This includes the following: Do not push or move furniture, jog or run 2. Do not drive for 2 days. 3.  Call Dr. Edilberto Arreguin at [phone] if you experience any of the following symptoms: 
Dizziness, lightheadedness, fainting spells Lack of energy Shortness of breath Rapid heart rate Chest or muscle twitches Blurry vision, double vision, weakness, numbness Nausea, vomiting Fever Bleeding in the stool, black stool Leg swelling, pain 4. Follow-up with Dr. Edilberto Arreguin Future Appointments Date Time Provider Jam Echevarria 1/23/2017 3:30 PM PACEMAKER3, [address]  
1/23/2017 4:00 PM Eddie Gtz  [address]  
3/1/2017 10:15 AM PACEMAKER3, [address]  
6/21/2017 10:00 AM Gloria Sanchez  [address] Discharge Orders None [facility] Announcement We are excited to announce that we are making your provider's discharge notes available to you in Va[employer]. You will see these notes when they are completed and signed by the physician that discharged you from your recent hospital stay. If you have any questions or concerns about any information you see in Va[employer], please call the Health Information Department where you were seen or reach out to your Primary Care Provider for more information about your plan of care. Introducing [facility]! Dear Mona Díaz: 
Thank you for requesting a Vaioni account. Our records indicate that you already have an active Va[employer] account. You can access your account anytime at [email or web address]. [employer] Did you know that you can access your hospital and ER discharge instructions at any time in Vaioni? You can also review all of your test results from your hospital stay or ER visit. Additional Information If you have questions, please visit the Frequently Asked Questions section of the Va[employer] website at [email or web address] [employer]/. Remember, Vaioni is NOT to be used for urgent needs. For medical emergencies, dial 911. Now available from your iPhone and Android! General Information Please provide this summary of care documentation to your next provider. Patient Signature:  ____________________________________________________________ Date:  ____________________________________________________________  
  
Aloma Snellen Provider Signature:  ____________________________________________________________ Date:  ____________________________________________________________

## 2017-01-10 NOTE — PROGRESS NOTES
TRANSFER - OUT REPORT:    Verbal report given to Will on Raven Campbell being transferred to holding for routine progression of care       Report consisted of patients Situation, Background, Assessment and   Recommendations(SBAR). Information from the following report(s) SBAR, Procedure Summary and MAR was reviewed with the receiving nurse. Opportunity for questions and clarification was provided.

## 2017-01-10 NOTE — ANESTHESIA PREPROCEDURE EVALUATION
Anesthetic History   No history of anesthetic complications            Review of Systems / Medical History  Patient summary reviewed, nursing notes reviewed and pertinent labs reviewed    Pulmonary  Within defined limits                 Neuro/Psych   Within defined limits           Cardiovascular            Dysrhythmias : atrial fibrillation           GI/Hepatic/Renal  Within defined limits              Endo/Other        Obesity  Pertinent negatives: No morbid obesity   Other Findings              Physical Exam    Airway  Mallampati: II  TM Distance: > 6 cm  Neck ROM: normal range of motion   Mouth opening: Normal     Cardiovascular  Regular rate and rhythm,  S1 and S2 normal,  no murmur, click, rub, or gallop             Dental  No notable dental hx       Pulmonary  Breath sounds clear to auscultation               Abdominal  GI exam deferred       Other Findings            Anesthetic Plan    ASA: 3  Anesthesia type: general          Induction: Intravenous  Anesthetic plan and risks discussed with: Patient

## 2017-01-11 VITALS
TEMPERATURE: 98.5 F | HEIGHT: 74 IN | HEART RATE: 71 BPM | DIASTOLIC BLOOD PRESSURE: 76 MMHG | OXYGEN SATURATION: 99 % | WEIGHT: 285.06 LBS | SYSTOLIC BLOOD PRESSURE: 131 MMHG | RESPIRATION RATE: 16 BRPM | BODY MASS INDEX: 36.58 KG/M2

## 2017-01-11 LAB — MAGNESIUM SERPL-MCNC: 2.1 MG/DL (ref 1.6–2.4)

## 2017-01-11 PROCEDURE — 74011250637 HC RX REV CODE- 250/637: Performed by: NURSE PRACTITIONER

## 2017-01-11 PROCEDURE — 83735 ASSAY OF MAGNESIUM: CPT | Performed by: INTERNAL MEDICINE

## 2017-01-11 PROCEDURE — 36415 COLL VENOUS BLD VENIPUNCTURE: CPT | Performed by: INTERNAL MEDICINE

## 2017-01-11 RX ADMIN — DABIGATRAN ETEXILATE MESYLATE 150 MG: 150 CAPSULE ORAL at 08:27

## 2017-01-11 RX ADMIN — SOTALOL HYDROCHLORIDE 120 MG: 80 TABLET ORAL at 08:26

## 2017-01-11 RX ADMIN — Medication 10 ML: at 06:28

## 2017-01-11 NOTE — PROGRESS NOTES
Problem: Falls - Risk of  Goal: *Absence of falls  Outcome: Progressing Towards Goal  Pt bedside table and call bell within reach. Pt aware to call for assistance as needed. Pt wearing gripper socks. Problem: Cath Lab Procedures: Post-Cath Day of Procedure (Initiate SCIP Measures for Post-Op Care)  Goal: Activity/Safety  Outcome: Progressing Towards Goal  Pt up ad brynn, ambulating in hallway. Goal: Psychosocial  Outcome: Progressing Towards Goal  Pt calm, cooperative, and pleasant. 0730: Bedside and Verbal shift change report given to Olive Crump (oncoming nurse) by Asha Contreras (offgoing nurse). Report included the following information SBAR, Kardex, ED Summary, Procedure Summary, Intake/Output, MAR, Recent Results and Cardiac Rhythm NSR/Paced.

## 2017-01-11 NOTE — PROCEDURES
DATE OF PROCEDURE:  1/10/2017    PROCEDURES  1. Comprehensive Electrophysiology study including CS/LA pacing  2. Ablations of paroxysmal atrial fibrillation by pulmonary vein isolations. 3. Intracardiac echocardiogram was utilized and transseptal catheterization were performed. 4. Fluoroscopy was also used along with 3-dimensional electroanatomical [facility] Velocity mapping. 5. Programming and reprogramming pacemaker post procedure    BRIEF HISTORY: This is a 49 y. o.man who had a history of paroxysmal atrial fibrillation with symptoms of palpitations. The patient does not have adequate symptom control with medical therapy, sotalol. He had persistent atrial fibrillation ablation in 2012 by PVI and complex fractionated atrial electrogram ablation method. He has underlying sick sinus syndrome and has dual chamber pacemaker. He is here for the atrial fibrillation ablation. The risks and benefits the procedure have been reviewed with him on several occasions and also with his brother, and they agreed to proceed. He is in sinus rhythm on presentation and also took last dose pradaxa last evening so no CHRISTEL was warranted. PROCEDURE IN DETAIL. After the informed written consent had been obtained, the patient was brought to the electrophysiology suite, where he was prepped and draped in usual sterile fashion. He had undergone general anesthesia. His blood pressure was low with the general anesthetic agent, and he was supported with Nilson-Synephrine. Access was obtained into the left and right femoral veins on a total of 4 occasions. Over the guidewires, a 6-Equatorial Guinean and a 9-Equatorial [place]inean introducer were inserted into the left femoral vein, and an 8 and5 F sheaths were inserted into the right femoral vein. The decapolar Bard diagnostic catheter was then advanced under fluoroscopy and placed in the coronary sinus for pacing and recording.  The ablation catheter was positioned at the HIS bundle and in the right ventricle for recording and pacing. He has right atrial pacing lead so no catheter was placed for pacing here. The intracardiac echocardiogram used was advanced under fluoroscopy into the mid-right atrium. The echocardiogram catheter is AcuNav. The cardiac structure was examined, and the patient does not have left atrial appendage thrombus. The interatrial septum was brought in view, and at this time, he was given intravenous heparin bolus and the [facility] RF Transseptal Catheterization System was then advanced over one of the long guidewire and then Agilis sheath into the SVC. The system was then pulled back under fluoroscopy and also with the intracardiac echocardiogram. Once the tenting of fossa ovalis was observed, the needle was then advanced into the left atrium. The saline injection was delivered via the needle and showed entry into the left atrium, and the needle was pulled back while the dilator and the long sheath was then advanced inside the left atrium. The dilator was removed along with the needle at the end. The sheath was then flushed with the pressurized heparinized saline fluid. Then, the 3-dimensional electroanatomical map of the left atrium was then obtained using the Spiral multielectrode circumferential [employer]. Sanket catheter. The pulmonary veins were obtained and matched up with the cardiac CT images that had been obtained in the past. The patient has 4 discrete pulmonary veins: Two on the right and two on the left. The mapping ablation catheter used is a D/F curved St Sanket Flex Ability saline irrigated tip ablation catheter, 3.5-mm tip. The pulmonary vein was accessed using the Spiral multielectrode catheter and also with the mapping ablation catheter. Voltage maps were performed and there was substantial scars around the entire ostium of the right pulmonary veins except for a small anterior area to the right superior vein.   All 4 pulmonary veins remain isolated with exit and blocks but there are viable tissues at the angel between the left inferior, superior vein and SALLY and inferior lips of left inferior PV anteriorly and posteriorly so ablations were done here at 30-35W  He had CFAE ablation in 2012. There are fractionated EGMs at the base of SALLY and this was also ablated. Ablations were 35W. It should also be mentioned that during the entire procedure, his heparin was given by extra bolus and also drip rate. His ACT was checked every 20 minutes to keep the ACT close to 250 seconds. Protamine was given to reverse heparin and sheaths were pulled    COMPLICATIONS: None. Specimen removed: NONE    Estimated blood loss is 10 ML    Protamine 60 mg total was used to reverse the heparin effect after completion of the procedure. The sheaths were removed, and manual compression was applied to both inguinal areas and achieved good hemostasis. The patient was then wakened up from the general anesthesia without complication. He moved all the extremities equally and there was no neurological deficit. The patient opened his eyes and followed commands. Intracardiac echo had shown a small pericardial effusion before and after ablation without changes in size or hemodynamic instability    Hemodynamically, the patient was stable and in sinus rhythm. The baseline measurement    ms QRS duration 80 ms  AH interval is 99 ms, HV is 47 ms, and AA interval 1000 ms  AV frank wenckebach 430 ms  AV frank  ms @ 600 ms    Pacemaker was tested and showed proper pacing threshold 0.5 V @ 0.4 ms and he has Medtronic pacemaker with lower pacing rate set at 70 bpm    ASSESSMENT AND PLAN: He will be continued with sotalol and Pradaxa for at least 3 months during the blanking period. Further monitoring of arrhythmia return will be continued in the office.

## 2017-01-11 NOTE — DISCHARGE INSTRUCTIONS
Patient Instructions Post-EP study and ablation    1. No heavy lifting or exercises for 1 week. This includes the following:  Do not push or move furniture, jog or run    2. Do not drive for 2 days. 3.  Call Dr. Lyndon Grant at [phone] if you experience any of the following symptoms:  Dizziness, lightheadedness, fainting spells  Lack of energy  Shortness of breath  Rapid heart rate  Chest or muscle twitches  Blurry vision, double vision, weakness, numbness  Nausea, vomiting  Fever  Bleeding in the stool, black stool  Leg swelling, pain    4.   Follow-up with Dr. Ulysses Stairs  Date Time Provider [facility] Sw[facility]   1/23/2017 3:30  [place], [address] [facility]   1/23/2017 4:00 PM Ade Palomino  [address]   3/1/2017 10:15 AM PACEMAKER3, [address]   6/21/2017 10:00 AM Ivana Teixeira  [address]

## 2017-01-11 NOTE — DISCHARGE SUMMARY
Cardiology Discharge Summary     Patient ID:  Muary Jane  [id number]  49 y.o.  1960    Admit Date: 1/10/2017    Discharge Date: 1/11/2017     Admitting Physician: Kaitlynn Gonzalez MD     Discharge Physician:Jayy Sher MD/ Renu Trevizo NP    Admission Diagnoses:  S/P ablation of atrial fibrillation    Discharge Diagnoses: Principal Problem:    S/P ablation of atrial fibrillation (1/10/2017)      Overview: Dr Mir Sher 1/10/16      Ablations of paroxysmal atrial fibrillation by pulmonary vein isolations    Active Problems:    PAF (paroxysmal atrial fibrillation) ([facility] [address] (4/30/2012)      Overview: 1/10/2017      Sick sinus syndrome ([facility] [address] (8/17/2012)      Overview: S/p Medtronic dual chamber pacemaker implant 8/17/2012        Discharge Condition: Good    Cardiology Procedures this Admission:  EP study and ablation of pulmonary veins     Consults: None    Discharge Exam:     Visit Vitals    /90 (BP 1 Location: Left arm, BP Patient Position: At rest)    Pulse 68    Temp 97.8 °F (36.6 °C)    Resp 18    Ht 6' 2\" (1.88 m)    Wt 285 lb 0.9 oz (129.3 kg)    SpO2 98%    BMI 36.6 kg/m2     General Appearance:  Well developed, well nourished,alert and oriented x 3, and individual in no acute distress. Ears/Nose/Mouth/Throat:   Hearing grossly normal.         Neck: Supple. Chest:   Lungs clear to auscultation bilaterally. Cardiovascular:  Regular rate and rhythm,  no murmur. Abdomen:   Soft, non-tender, bowel sounds are active. Extremities: No edema bilaterally. Skin: Warm and dry. Left sided ppm site unremarkable                HOSPITAL COURSE: Maury Jane is 49 y. o. who was admitted for EP study and atrial fibrillation ablation. Patient kept in the CVSU overnight for monitoring. He will go home on sotalol and Pradaxa. Medications and side effects reviewed. No arrhythmia seen on telemetry overnight. He will follow up in the office as scheduled.        Disposition: home    Patient Instructions: Current Discharge Medication List      CONTINUE these medications which have NOT CHANGED    Details   PRADAXA 150 mg capsule TAKE 1 CAP BY MOUTH EVERY TWELVE (12) HOURS. Qty: 60 Cap, Refills: 6      sotalol (BETAPACE) 120 mg tablet Take 1 Tab by mouth every twelve (12) hours. Qty: 60 Tab, Refills: 3      acetaminophen (TYLENOL EXTRA STRENGTH) 500 mg tablet Take 1,000 mg by mouth every six (6) hours as needed for Pain. Referenced discharge instructions provided by nursing for diet and activity.             Follow-up with as scheduled   Future Appointments  Date Time Provider Jam Echevarria   1/23/2017 3:30 PM [address] [place] [address], [address]   1/23/2017 4:00 PM Ayanna Cheney  [address]   3/1/2017 10:15 AM Shayan [facility] Ivana Codys DANIEL NICHOLAS   6/21/2017 10:00 AM Andrew Campos  [address]     Addendum from EP attending:   I have seen, examined patient, and discussed with nurse practitioner, registered nurse, reviewed, updated note and agree with the assessment and plan    I have talked to him and he is feeling well, no pain, has been walking and eating normally  Vital signs are stable  Exam shows regular rhythm and no rub     Assessment and Plan:  Continue to monitor afib on pacemaker  He had good ablation first time overall and breakthrough episodes of PAF noted on pacemaker could have been related to L inferior PV and the areas were ablated again  He will continue meds and follow up as is

## 2017-01-17 ENCOUNTER — PATIENT OUTREACH (OUTPATIENT)
Dept: CARDIOLOGY CLINIC | Age: 57
End: 2017-01-17

## 2017-01-17 NOTE — PROGRESS NOTES
This note will not be viewable in [address] Transitional Care Nurse Navigator Note:  Hospital Follow Up for hospital visit to : Emilee Schwartz Haroon 10-11, 2017 for elective atrial fibrillation ablation . This represents NN follow up because NN spoke with patient and/or caregiver within 4 business days of discharge. Pt's TCM follow up appt is scheduled with Dr. Daysi Arroyo 23, 3:30 pm - pacer check and Dr. Pena Done follow up. Call to and reached pt who is doing well post procedure - He has his Betapace and Pradaxa - both bid - his leg had bruising that is getting better - he also has some bruising associated with IV site - both have no appearance of redness or other. Post procedure information reviewed. Pt is aware of appt 1/23 for follow up. Medical History:     Past Medical History   Diagnosis Date    Atrial fibrillation [facility])     Pacemaker      Patient presenting symptoms : Cardiology office visit 1/4/17 - Dr. Becky Craven  Assessment/Plan:          ICD-10-CM ICD-9-CM     1. Paroxysmal atrial fibrillation (HCC) I48.0 427.31 AMB POC EKG ROUTINE W/ 12 LEADS, INTER & REP   2. Pacemaker Z95.0 V45.01     3. Chronic anticoagulation Z79.01 V58.61     4. Sick sinus syndrome (HCC) I49.5 427.81        He has so far maintained normal sinus rhythm on sotalol   He had a atrial fibrillation ablation 2012 and did very well afterwards. Lately atrial fibrillation burden has been increasing on ppm and recent hospital admission for A fib with RVR. We had previously discussed a second atrial fibrillation in the hospital and today in the office. He would like to proceed, he will continue sotalol and pradaxa for the procedure. Stop ASA no hx of CAD, normal stress test.   _____________________________________________________________________________    Hospital notes - procedure note - Dr. Pena Done -  Patient: Sophie Carlson  MRN: [id number]  SSN: [id number]   YOB: 1960 Age: 49 y.o.   Sex: male      Date of Procedure: 1/10/2017   Pre-procedure Diagnosis: PAF post ablation of persistent AF in 2012 and cardioversion of PAF in 11/2016. Sick sinus syndrome, medtronic pacemaker  Post-procedure Diagnosis: same  Procedure: EP Study and Ablation PVI  :  Dr. Patric Baumgarten, MD     Findings: all 4 pulmonary veins remain isolated with exit and blocks but there are viable tissues at the angel between the left inferior, superior vein and SALLY and inferior lips of left inferior PV anteriorly and posteriorly so ablations were done here  There is a small viable tissue in the anterior right superior PV and this was ablated  He had CFAE ablation in 2012. There are fractionated EGMs at the base of SALLY and this was also ablated    Dual chamber Medtronic pacer was checked with pacing threshold 0.5 V @ 0.4 ms and programmed to MVP R 70 -130 bpm  Signed by: Patric Baumgarten, MD 1/10/2017 12:38 PM    Cardiology   Pre-procedure Diagnoses:   1. PAF (paroxysmal atrial fibrillation) ([address] [I48.0]   2. Cardiac pacemaker in situ [Z95.0]   Post-procedure Diagnoses:   1. S/P ablation of atrial fibrillation [Z98.890, Z86.79]   Procedures:   1. NH EPHYS EVL TRNSPTL TX ATRIAL FIB ISOLAT PULM VEIN X2809555 (CPT®)]   2. NH INTRACARDIAC ELECTROPHYSIOLOGIC 3D MAPPING B8731970 (CPT®)]   3. NH COMPRE ELECTROPHYSIOL XM W/LEFT ATRIAL PACNG/REC N445636 (CPT®)]   4. INTRACARD ECHO, THER/DX INTERVENT [id number] (CPT®)]   5. NH FATMATA-PX EVAL&PROGRAM IN [facility] PACEMAKER SYSTEM [23298 (CPT®)]      []DATE OF PROCEDURE: 1/10/2017     PROCEDURES  1. Comprehensive Electrophysiology study including CS/LA pacing  2. Ablations of paroxysmal atrial fibrillation by pulmonary vein isolations. 3. Intracardiac echocardiogram was utilized and transseptal catheterization were performed. 4. Fluoroscopy was also used along with 3-dimensional electroanatomical [facility] Vel[employer] mapping.   5. Programming and reprogramming pacemaker post procedure __________________________________________________________________________    Admit Date: 1/10/2017   Discharge Date: 1/11/2017    Admitting Physician: Yun Culp MD    Discharge Physician:Jayy Farah MD/ Katty Prado NP     Admission Diagnoses: S/P ablation of atrial fibrillation   Discharge Diagnoses: Principal Problem:  S/P ablation of atrial fibrillation (1/10/2017)  Overview: Dr Cornelious Felty 1/10/16  Ablations of paroxysmal atrial fibrillation by pulmonary vein isolations     Active Problems:  PAF [facility]oxysmal [facility] fibrillation) [facility] (4/30/2012)  Overview: 1/10/2017     Sick sinus syndrome ([facility] (8/17/2012)  Overview: S/p Medtronic dual chamber pacemaker implant 8/17/2012    HOSPITAL COURSE: Belinda Rosario is 49 y. o. who was admitted for EP study and atrial fibrillation ablation. Patient kept in the CVSU overnight for monitoring. He will go home on sotalol and Pradaxa. Medications and side effects reviewed. No arrhythmia seen on telemetry overnight. He will follow up in the office as scheduled.    _____________________________________________________________________________  Lab Results  Component Value Date/Time   WBC 6.4 12/13/2016 02:30 PM   HGB 15.1 12/13/2016 02:30 PM   HCT 44.4 12/13/2016 02:30 PM   PLATELET 654 21/54/3236 02:30 PM   MCV 94 12/13/2016 02:30 PM     Lab Results  Component Value Date/Time   Cholesterol, total 100 11/22/2016 04:33 AM   HDL Cholesterol 41 11/22/2016 04:33 AM   LDL, calculated 47.2 11/22/2016 04:33 AM   Triglyceride 59 11/22/2016 04:33 AM   CHOL/HDL Ratio 2.4 11/22/2016 04:33 AM     Lab Results  Component Value Date/Time   GFR est  12/13/2016 02:30 PM   GFR est non- 12/13/2016 02:30 PM   Creatinine 0.77 12/13/2016 02:30 PM   BUN 10 12/13/2016 02:30 PM   Sodium 139 12/13/2016 02:30 PM   Potassium 4.3 12/13/2016 02:30 PM   Chloride 101 12/13/2016 02:30 PM   CO2 24 12/13/2016 02:30 PM      Lab Results  Component Value Date/Time   TSH 1.48 11/22/2016 04:33 AM __________________________________________________________________________  Prescription Medication total: 2     Advance Medical Directive on file in EMR? no     Total Hospitalizations/ED visits last 12 months? 2    Home Health orders at discharge? no If yes, what agency and what services? none     Called patient on Jan 17, 2017 and verified with 2 identifiers. Medication Reconciliation completed: yes New medications at discharge include remain on Betapace and Pradaxa, bid - Tylenol, prn.    Plan: MD on call at [phone] 24/7. Follow up appt with cardiology on 01/23/2017 3:30pm    1/23/2017 3:30 PM Miriam Quiroz [phone] [address]   Other Appointments Today      Provider Jam Echevarria   1/23/2017 4:00 PM Karen Mayes MD [address]   Future Appointments      Provider Jam Echevarria   1/23/2017 3:30 PM PACEMAKER3, [place] [facility] DANIEL [address]   1/23/2017 4:00 PM Karen Mayes MD [facility] [place] [address]   3/1/2017 10:15 AM PACEMAKER3, [place] [facility] [address]   6/21/2017 10:00 AM Miguel Cabrera MD [facility]      Plan: MD on call 24/7 at [phone]   Post procedure precautions and guidelines   Follow up 1/23 - pacer check and MD visit   No asa - no CAD history. Monitor and record bp/ pulse, as possible.      Sherin Ramirez RN , Delia Bernard, [facility]   [facility]  [phone]

## 2017-01-23 ENCOUNTER — OFFICE VISIT (OUTPATIENT)
Dept: CARDIOLOGY CLINIC | Age: 57
End: 2017-01-23

## 2017-01-23 ENCOUNTER — CLINICAL SUPPORT (OUTPATIENT)
Dept: CARDIOLOGY CLINIC | Age: 57
End: 2017-01-23

## 2017-01-23 VITALS
HEART RATE: 81 BPM | DIASTOLIC BLOOD PRESSURE: 68 MMHG | HEIGHT: 74 IN | WEIGHT: 286 LBS | BODY MASS INDEX: 36.7 KG/M2 | SYSTOLIC BLOOD PRESSURE: 112 MMHG

## 2017-01-23 DIAGNOSIS — I48.0 PAF (PAROXYSMAL ATRIAL FIBRILLATION) (HCC): Primary | ICD-10-CM

## 2017-01-23 DIAGNOSIS — Z86.79 S/P ABLATION OF ATRIAL FIBRILLATION: ICD-10-CM

## 2017-01-23 DIAGNOSIS — I47.1 SVT (SUPRAVENTRICULAR TACHYCARDIA) (HCC): ICD-10-CM

## 2017-01-23 DIAGNOSIS — Z79.899 ENCOUNTER FOR MONITORING SOTALOL THERAPY: ICD-10-CM

## 2017-01-23 DIAGNOSIS — I49.5 SICK SINUS SYNDROME (HCC): ICD-10-CM

## 2017-01-23 DIAGNOSIS — Z79.01 CHRONIC ANTICOAGULATION: ICD-10-CM

## 2017-01-23 DIAGNOSIS — Z51.81 ENCOUNTER FOR MONITORING SOTALOL THERAPY: ICD-10-CM

## 2017-01-23 DIAGNOSIS — Z95.0 CARDIAC PACEMAKER IN SITU: ICD-10-CM

## 2017-01-23 DIAGNOSIS — Z98.890 S/P ABLATION OF ATRIAL FIBRILLATION: ICD-10-CM

## 2017-01-23 DIAGNOSIS — Z95.0 CARDIAC PACEMAKER IN SITU: Primary | ICD-10-CM

## 2017-01-23 NOTE — PROGRESS NOTES
Cardiac Electrophysiology Office Note     Subjective:      John Cason is a 49 y.o. male patient who is seen for evaluation of atrial fibrillation. He is here today s/p A fib ablation 1/10/17, he was discharged on sotalol and pradaxa. He says he has been doing well. No fast heart rates he reports or irregular rhythms. No lightheadedness or dizziness. No reported SOB. No bleeding pradaxa. Past Hx:  He was started on sotalol after  [facility] 11/21/16 in the hospital when cardizem IV could not control his rate. Since [facility] and d/c from hospital he says he has been doing well. He felt better being out of AF  He wants to have repeated ablation    He is a patient of Dr Yasmeen Andrade and I had done pacemaker for sick sinus syndrome before and had done AF ablation for him 4 years ago   Admitted 11/21/16 for chest pain and stress test was normal   Pmhx includes atrial fibrillation with RVR, hx of atrial fibrillation ablation 7/25/2012 PAT, NSVT (5/4/16 on device check), SSS s/p pacemaker (Medtronic dual chamber pacemaker implant 8/17/2012), hx of unsuccessful [facility] 5/2012.      Previous cardiac testing  Echo 2012 LVEF 55 % to 60 %. No RWMA. Ventricular septum: There was a possible very small congenital defect in the perimembranous location. No significant valve abnormalities. Nuclear stress test 2/2012 no ischemia, LVEF 51%    Patient Active Problem List    Diagnosis Date Noted    S/P ablation of atrial fibrillation 01/10/2017    SVT (supraventricular tachycardia) 11/22/2016    Pacemaker 08/31/2012    Sick sinus syndrome ([address] 08/17/2012    Cough 08/06/2012    Atelectasis 08/06/2012    Other dyspnea and respiratory abnormality 08/06/2012    Sinus bradycardia on ECG 08/06/2012    Dizziness 06/04/2012    PAF (paroxysmal atrial fibrillation) ([address] 04/30/2012     Current Outpatient Prescriptions   Medication Sig Dispense Refill    PRADAXA 150 mg capsule TAKE 1 CAP BY MOUTH EVERY TWELVE (12) HOURS.  [address] [facility] sotalol (BETAPACE) 120 mg tablet Take 1 Tab by mouth every twelve (12) hours. 60 Tab 3    acetaminophen (TYLENOL EXTRA STRENGTH) 500 mg tablet Take 1,000 mg by mouth every six (6) hours as needed for Pain. Allergies   Allergen Reactions    Silvadene [Silver Sulfadiazine] Swelling     Past Medical History   Diagnosis Date    Atrial fibrillation [facility])     Pacemaker      Past Surgical History   Procedure Laterality Date    Hx wisdom teeth extraction       40 yrs ago    Donny echo color flow  7/25/2012          Ep study w/o induction  7/25/2012          Ablation, atrial fibrillation   7/25/2012          Pr intracardiac electrophysiologic 3d mapping  7/25/2012          Pr left heart cath by transeptal puncture  7/25/2012          Echo intracardiac  7/25/2012          Ins ppm/icd led dual only  8/17/2012          Hx pacemaker       Family History   Problem Relation Age of Onset    Hypertension Mother      Social History   Substance Use Topics    Smoking status: Never Smoker    Smokeless tobacco: Never Used    Alcohol use No        Review of Systems:   Constitutional: Negative for fever, chills, weight loss, malaise/fatigue. HEENT: Negative for nosebleeds, vision changes. Respiratory: Negative for cough, hemoptysis, sputum production, and wheezing. Cardiovascular: Negative for chest pain, palpitations, orthopnea, claudication, leg swelling, syncope, and PND. Gastrointestinal: Negative for nausea, vomiting, diarrhea, constipation, blood in stool and melena. Genitourinary: Negative for dysuria, and hematuria. Musculoskeletal: Negative for myalgias, arthralgia. Skin: Negative for rash. Heme: Does not bleed or bruise easily.    Neurological: Negative for speech change and focal weakness     Objective:     Visit Vitals    /68 (BP 1 Location: Left arm, BP Patient Position: Sitting)    Pulse 81    Ht 6' 2\" (1.88 m)    Wt 286 lb (129.7 kg)    BMI 36.72 kg/m2      Physical Exam: Constitutional: well-developed and well-nourished. No distress. Head: Normocephalic and atraumatic. Eyes: Pupils are equal, round  Neck: supple. No JVD present. Cardiovascular: Normal rate, regular rhythm. Exam reveals no gallop and no friction rub. No murmur heard. Pulmonary/Chest: Effort normal and breath sounds normal. No wheezes. left side   Abdominal: Soft, no tenderness. Musculoskeletal: no edema. Brace left ankle   Neurological: alert,oriented. Skin: Skin is warm and dry. Dr Sammy Newman skin   Psychiatric: normal mood and affect. Behavior is normal. Judgment and thought content normal.           Assessment/Plan:       ICD-10-CM ICD-9-CM    1. PAF (paroxysmal atrial fibrillation) ([facility]) I48.0 427.31           Thank you for involving me in this patient's care and please call with further concerns or questions. Sherie Torres M.D.   Electrophysiology/Cardiology  [facility] [place]  [facility] [address], [place] [address] [place] [address]  [phone]

## 2017-01-23 NOTE — PATIENT INSTRUCTIONS
Follow up with Dr. Birt Saint as scheduled.     Future Appointments  Date Time Provider Jam Swathi   5/1/2017 9:30 AM [place] SANCHEZ SCHED   6/21/2017 10:00 AM Alfonso Higgins  [address]   8/7/2017 10:15 AM [place] SANCHEZ SCHED   11/13/2017 9:30 AM [place] [address], [address] [facility]   2/19/2018 2:00 PM Lazaro Graves  [address] 2/19/2018 2:00 PM [address] [place] [address], [address] [facility]

## 2017-01-23 NOTE — MR AVS SNAPSHOT
Visit Information Date & Time Provider Department Dept. Phone Encounter #  
 1/23/2017  4:00 PM Isa Varela MD [facility] [phone] [id number] Your Appointments 5/1/2017  9:30 AM  
Any with SARMAD APODACA [facility] ([facility] SCHEDULING) Appt Note: yolanda Ernandez, b1/23/17  
 [address]  
[address]  
  
    
 6/21/2017 10:00 AM  
ESTABLISHED PATIENT with Yasmine Dorman MD  
[facility] ([facility]) Appt Note: 6 month per Dr. Zana Sullivan [address]  
[address]  
  
    
 2/19/2018  2:00 PM  
ESTABLISHED PATIENT with Isa Varela MD  
[facility] ([facility]) Appt Note: dorothy richards, annual thresh/CL, see Farah  $40 SP  white 1/23/17  
 [address]  
[address] [address]  
  
    
 2/19/2018  2:00 PM  
PACEMAKER with Osiris Zaman [facility] ([facility]ANIEL SCHEDULING) Appt Note: dorothy richards, annual thresh, CL, see [address]  
[address] [address]  
  
    
  
 8/7/2017 10:15 AM  
REMOTE OFFICE VISIT with Liv Conn [facility] ([facility] SCHEDULING) Appt Note: dorothy richards  
 [address] [address]  
[address] [address]  
  
    
 11/13/2017  9:30 AM  
REMOTE OFFICE VISIT with Liv Conn [facility] ([facility] SCHEDULING) Appt Note: dorothy richards  
 [address]  
[phone] Upcoming Health Maintenance Date Due Hepatitis C Screening 1960 DTaP/Tdap/Td series (1 - Tdap) 6/26/1983 FOBT Q 1 YEAR AGE 50-75 6/26/2012 Allergies as of 1/23/2017  Review Complete On: 1/23/2017 By: Shaun Aguirre MD  
  
 Severity Noted Reaction Type Reactions Silvadene [Silver Sulfadiazine]  01/30/2012    Swelling Current Immunizations  Reviewed on 7/26/2012 Name Date Influenza Vaccine (Quad) PF 11/23/2016  4:08 PM  
  
 Not reviewed this visit You Were Diagnosed With   
  
 Codes Comments PAF (paroxysmal atrial fibrillation) ([facility]    -  Primary ICD-10-CM: I48.0 ICD-9-CM: 427.31   
 SVT (supraventricular tachycardia)     ICD-10-CM: I47.1 ICD-9-CM: 427.89 Sick sinus syndrome (HCC)     ICD-10-CM: I49.5 ICD-9-CM: 427.81 Chronic anticoagulation     ICD-10-CM: Z79.01 
ICD-9-CM: V58.61 Cardiac pacemaker in situ     ICD-10-CM: Z95.0 ICD-9-CM: V45.01 Vitals BP Pulse Height(growth percentile) Weight(growth percentile) BMI Smoking Status 112/68 (BP 1 Location: Left arm, BP Patient Position: Sitting) 81 6' 2\" (1.88 m) 286 lb (129.7 kg) 36.72 kg/m2 Never Smoker Vitals History BMI and BSA Data Body Mass Index Body Surface Area  
 36.72 kg/m 2 2.6 m 2 Preferred Pharmacy Pharmacy Name Phone [id number] [phone] Your Updated Medication List  
  
   
This list is accurate as of: 1/23/17  4:53 PM.  Always use your most recent med list.  
  
  
  
  
 PRADAXA 150 mg capsule Generic drug:  dabigatran etexilate TAKE 1 CAP BY MOUTH EVERY TWELVE (12) HOURS.  
  
 sotalol 120 mg tablet Commonly known as:  Florance Poisson Take 1 Tab by mouth every twelve (12) hours. TYLENOL EXTRA STRENGTH 500 mg tablet Generic drug:  acetaminophen Take 1,000 mg by mouth every six (6) hours as needed for Pain. We Performed the Following AMB POC EKG ROUTINE W/ 12 LEADS, INTER & REP [97131 CPT(R)] Patient Instructions Follow up with Dr. Talib Jimenez as scheduled. Future Appointments Date Time Provider Jam Swathi 5/1/2017 9:30 AM REMOTE1, SARMAD SANCHEZ SCHED  
6/21/2017 10:00 AM Cheryl Holman  [address]  
8/7/2017 10:15 AM REMOTE1, SARMAD SANCHEZ SCHED  
11/13/2017 9:30 AM REMOTE1, [address]  
2/19/2018 2:00 PM Varun Crum  [address]  
2/19/2018 2:00  [facility], [address] [facility]! Dear Jordin Hinojosa: 
Thank you for requesting a [employer] account. Our records indicate that you already have an active [employer] account. You can access your account anytime at [email or web address] Did you know that you can access your hospital and ER discharge instructions at any time in [employer]? You can also review all of your test results from your hospital stay or ER visit. Additional Information If you have questions, please visit the Frequently Asked Questions section of the [employer] website at [email or web address] Remember, [employer] is NOT to be used for urgent needs. For medical emergencies, dial 911. Now available from your iPhone and Android! Please provide this summary of care documentation to your next provider. Your primary care clinician is listed as Priti Jaime. If you have any questions after today's visit, please call [phone].

## 2017-01-23 NOTE — PROGRESS NOTES
Chief Complaint   Patient presents with    Irregular Heart Beat     a-fib    Slow Heart Rate    Follow-up     from a-fib ablation     Verified medications with the patient.

## 2017-01-24 NOTE — PROGRESS NOTES
Cardiac Electrophysiology Office Note     Subjective:      Maury Jane is a 49 y.o. male patient who is seen for follow up of atrial fibrillation and pacemaker. He had A fib ablation 1/10/17 (he was admitted with recurrent AF in 2016 and was cardioverted)  He had been on and was discharged on sotalol and pradaxa. He says he has been doing well. No fast heart rates he reports or irregular rhythms that he can feel. No lightheadedness or dizziness. No reported SOB. No bleeding pradaxa. Past Hx:  He was started on sotalol after  [facility] 11/21/16 in the hospital when cardizem IV could not control his rate. Since [facility] and d/c from hospital he says he has been doing well. He felt better being out of AF  He wants to have repeated ablation    He is a patient of Dr Thais Vasquez and I had done pacemaker for sick sinus syndrome before and I had done AF ablation for him 4 years ago   Admitted 11/21/16 for chest pain and stress test was normal   Pmhx includes atrial fibrillation with RVR, hx of atrial fibrillation ablation 7/25/2012 PAT, NSVT (5/4/16 on device check), SSS s/p pacemaker (Medtronic dual chamber pacemaker implant 8/17/2012), hx of unsuccessful [facility] 5/2012.      Previous cardiac testing  Echo 2012 LVEF 55 % to 60 %. No RWMA. Ventricular septum: There was a possible very small congenital defect in the perimembranous location. No significant valve abnormalities.    Nuclear stress test 2/2012 no ischemia, LVEF 51%    Patient Active Problem List    Diagnosis Date Noted    S/P ablation of atrial fibrillation 01/10/2017    SVT (supraventricular tachycardia) 11/22/2016    Pacemaker 08/31/2012    Sick sinus syndrome ([address] 08/17/2012    Cough 08/06/2012    Atelectasis 08/06/2012    Other dyspnea and respiratory abnormality 08/06/2012    Sinus bradycardia on ECG 08/06/2012    Dizziness 06/04/2012    PAF (paroxysmal atrial fibrillation) ([address] 04/30/2012     Current Outpatient Prescriptions   Medication Sig Dispense Refill    PRADAXA 150 mg capsule TAKE 1 CAP BY MOUTH EVERY TWELVE (12) HOURS. 60 Cap 6    sotalol (BETAPACE) 120 mg tablet Take 1 Tab by mouth every twelve (12) hours. 60 Tab 3    acetaminophen (TYLENOL EXTRA STRENGTH) 500 mg tablet Take 1,000 mg by mouth every six (6) hours as needed for Pain. Allergies   Allergen Reactions    Silvadene [Silver Sulfadiazine] Swelling     Past Medical History   Diagnosis Date    Atrial fibrillation [facility])     Pacemaker      Past Surgical History   Procedure Laterality Date    Hx wisdom teeth extraction       40 yrs ago    Donny echo color flow  7/25/2012          Ep study w/o induction  7/25/2012          Ablation, atrial fibrillation   7/25/2012          Pr intracardiac electrophysiologic 3d mapping  7/25/2012          Pr left heart cath by transeptal puncture  7/25/2012          Echo intracardiac  7/25/2012          Ins ppm/icd led dual only  8/17/2012          Hx pacemaker       Family History   Problem Relation Age of Onset    Hypertension Mother      Social History   Substance Use Topics    Smoking status: Never Smoker    Smokeless tobacco: Never Used    Alcohol use No        Review of Systems:   Constitutional: Negative for fever, chills, weight loss, malaise/fatigue. HEENT: Negative for nosebleeds, vision changes. Respiratory: Negative for cough, hemoptysis, sputum production, and wheezing. Cardiovascular: Negative for chest pain, palpitations, orthopnea, claudication, leg swelling, syncope, and PND. Gastrointestinal: Negative for nausea, vomiting, diarrhea, constipation, blood in stool and melena. Genitourinary: Negative for dysuria, and hematuria. Musculoskeletal: Negative for myalgias, arthralgia. Skin: Negative for rash. Heme: Does not bleed or bruise easily.    Neurological: Negative for speech change and focal weakness     Objective:     Visit Vitals    /68 (BP 1 Location: Left arm, BP Patient Position: Sitting)    Pulse 81    Ht 6' 2\" (1.88 m)    Wt 286 lb (129.7 kg)    BMI 36.72 kg/m2      Physical Exam:   Constitutional: well-developed and well-nourished. No distress. Head: Normocephalic and atraumatic. Eyes: Pupils are equal, round  Neck: supple. No JVD present. Cardiovascular: Normal rate, regular rhythm. Exam reveals no gallop and no friction rub. No murmur heard. Pulmonary/Chest: Effort normal and breath sounds normal. No wheezes. left side   Abdominal: Soft, no tenderness. Musculoskeletal: no edema. Brace left ankle   Neurological: alert,oriented. Skin: Skin is warm and dry. Dr Justina Bautista skin   Psychiatric: normal mood and affect. Behavior is normal. Judgment and thought content normal.       ECG atrial pacing with ventriclar sensing and artifacts and one PVC  QTc normal    Assessment/Plan:       ICD-10-CM ICD-9-CM    1. PAF (paroxysmal atrial fibrillation) ([facility]) I48.0 427.31 AMB POC EKG ROUTINE W/ 12 LEADS, INTER & REP   2. SVT (supraventricular tachycardia) I47.1 427.89 AMB POC EKG ROUTINE W/ 12 LEADS, INTER & REP   3. Sick sinus syndrome (HCC) I49.5 427.81    4. Chronic anticoagulation Z79.01 V58.61    5. Cardiac pacemaker in situ Z95.0 V45.01    6. S/P ablation of atrial fibrillation Z98.890 V45.89     Z86.79     7. Encounter for monitoring sotalol therapy Z51.81 V58.83     Z79.899 V58.69      The patient has been doing well after the catheter ablation of AF. He is asymptomatic. The interrogation of pacemaker showed in the last few days he has had a few minutes to one hour of PAF. However once again he is  asymptomatic and it is still early post ablation. He will continue pradaxa and sotalol. He has appointment with Dr. Nela Juarez and subsequent pacemaker checkups. Follow-up Disposition:  Return in about 6 months (around 7/23/2017). Thank you for involving me in this patient's care and please call with further concerns or questions. Vahid Livingston M.D.   Electrophysiology/Cardiology  [facility]  [address], [address]  [phone]

## 2017-02-22 RX ORDER — SOTALOL HYDROCHLORIDE 120 MG/1
TABLET ORAL
Qty: 60 TAB | Refills: 2 | Status: SHIPPED | OUTPATIENT
Start: 2017-02-22 | End: 2017-05-20 | Stop reason: SDUPTHER

## 2017-05-01 ENCOUNTER — OFFICE VISIT (OUTPATIENT)
Dept: CARDIOLOGY CLINIC | Age: 57
End: 2017-05-01

## 2017-05-01 DIAGNOSIS — Z95.0 CARDIAC PACEMAKER IN SITU: Primary | ICD-10-CM

## 2017-05-22 RX ORDER — SOTALOL HYDROCHLORIDE 120 MG/1
TABLET ORAL
Qty: 60 TAB | Refills: 2 | Status: SHIPPED | OUTPATIENT
Start: 2017-05-22 | End: 2017-08-16 | Stop reason: SDUPTHER

## 2017-07-20 ENCOUNTER — DOCUMENTATION ONLY (OUTPATIENT)
Dept: CARDIOLOGY CLINIC | Age: 57
End: 2017-07-20

## 2017-07-20 ENCOUNTER — OFFICE VISIT (OUTPATIENT)
Dept: CARDIOLOGY CLINIC | Age: 57
End: 2017-07-20

## 2017-07-20 ENCOUNTER — TELEPHONE (OUTPATIENT)
Dept: CARDIOLOGY CLINIC | Age: 57
End: 2017-07-20

## 2017-07-20 DIAGNOSIS — Z95.0 CARDIAC PACEMAKER IN SITU: Primary | ICD-10-CM

## 2017-07-20 DIAGNOSIS — I48.92 ATRIAL FLUTTER, UNSPECIFIED TYPE (HCC): Primary | ICD-10-CM

## 2017-07-20 RX ORDER — METOPROLOL TARTRATE 50 MG/1
50 TABLET ORAL 2 TIMES DAILY
Qty: 60 TAB | Refills: 2 | OUTPATIENT
Start: 2017-07-20 | End: 2017-07-20 | Stop reason: SDUPTHER

## 2017-07-20 NOTE — PROGRESS NOTES
Spoke with patient. Explained that Dr. Doim Rizzo wants him to start metoprolol 50mg, 2 times a day and that it was sent to his pharmacy. He recognized it as a medication that he had been on before and verbalized understanding. Confirmed that he has an appointment with Dr. Michelle Menjivar on Monday 7/24/17.   He also has a remote pacemaker transmission scheduled for early August.

## 2017-07-20 NOTE — TELEPHONE ENCOUNTER
Spoke with patient. He had sent remote pacemaker transmission showing atiral flutter with RVR, rates 130's-140's, ongoing flutter episode has lasted 33.5 hrs. Patient reports that he is aware of fast heart beat but denies sob, dizziness, or chest pain. Dr Karla Moncada notified and patient started on metoprolol 50mg BID. Pt. Rubio Patel that he will pick it up from pharmacy and start it tonight. He has a follow up appointment with Dr. Sebastián Zelaya on Monday, 7/24/17.

## 2017-07-20 NOTE — TELEPHONE ENCOUNTER
States that his fit bit is reading his heart rate at 135bpm.  He feels fine but wanted to call to see what he should do. Offered appt today or for him to send a download from home if closer. He will send a download and call us afterwards.

## 2017-07-20 NOTE — TELEPHONE ENCOUNTER
Patient said he was told his medication would be sent to [facility] [address] But they never received it   Please call once this has been sent

## 2017-07-21 ENCOUNTER — TELEPHONE (OUTPATIENT)
Dept: CARDIOLOGY CLINIC | Age: 57
End: 2017-07-21

## 2017-07-21 RX ORDER — DABIGATRAN ETEXILATE MESYLATE 150 MG/1
CAPSULE ORAL
Qty: 60 CAP | Refills: 6 | Status: SHIPPED | OUTPATIENT
Start: 2017-07-21 | End: 2018-02-19 | Stop reason: SDUPTHER

## 2017-07-21 RX ORDER — METOPROLOL TARTRATE 50 MG/1
50 TABLET ORAL 2 TIMES DAILY
Qty: 60 TAB | Refills: 6 | Status: SHIPPED | OUTPATIENT
Start: 2017-07-21 | End: 2017-07-24 | Stop reason: DRUGHIGH

## 2017-07-21 NOTE — TELEPHONE ENCOUNTER
Please call . Imer Ch at [phone]. He states that his pharmacist has questioned whether or not he should take 3 of his medications at the same time. He'd also like a call made to the pharmacy about this, phone [phone].     Thank you, Vernell Ozuna

## 2017-07-21 NOTE — TELEPHONE ENCOUNTER
Request for metoprolol 50mg twice a day. Last office visit 1/23/17, next office visit 2/19/18.  Refills per verbal order from Nisreen Naidu NP.

## 2017-07-21 NOTE — TELEPHONE ENCOUNTER
Request for pradaxa 150mg twice a day. Last office visit 1/23/17, next office visit 7/24/17. Refills per verbal order from Dr. Miguelangel Phoenix.

## 2017-07-21 NOTE — TELEPHONE ENCOUNTER
Verified patient with two types of identifiers. Pharmacy wanted to verify if he should be taking both sotalol and metoprolol. Notified pharmacy he should be on both. Pharmacy will notify the patient.

## 2017-07-21 NOTE — TELEPHONE ENCOUNTER
From: Walt Guerra  To: Tawnya Jeong NP  Sent: 7/20/2017 5:41 PM EDT  Subject: Medication Renewal Request    Original authorizing provider: JAKI Landers would like a refill of the following medications:  metoprolol tartrate (LOPRESSOR) 50 mg tablet Tawnya Jeong NP]    Preferred pharmacy: [facility]/PHARMACY #[id number] [place]    Comment:

## 2017-07-24 ENCOUNTER — OFFICE VISIT (OUTPATIENT)
Dept: CARDIOLOGY CLINIC | Age: 57
End: 2017-07-24

## 2017-07-24 VITALS
HEIGHT: 74 IN | HEART RATE: 75 BPM | DIASTOLIC BLOOD PRESSURE: 70 MMHG | BODY MASS INDEX: 37.6 KG/M2 | OXYGEN SATURATION: 99 % | WEIGHT: 293 LBS | SYSTOLIC BLOOD PRESSURE: 98 MMHG

## 2017-07-24 DIAGNOSIS — I47.1 SVT (SUPRAVENTRICULAR TACHYCARDIA) (HCC): ICD-10-CM

## 2017-07-24 DIAGNOSIS — I48.0 PAF (PAROXYSMAL ATRIAL FIBRILLATION) (HCC): Primary | ICD-10-CM

## 2017-07-24 RX ORDER — METOPROLOL TARTRATE 25 MG/1
25 TABLET, FILM COATED ORAL 2 TIMES DAILY
COMMUNITY
End: 2017-09-13 | Stop reason: SDUPTHER

## 2017-07-24 NOTE — MR AVS SNAPSHOT
Visit Information Date & Time Provider Department Dept. Phone Encounter #  
 7/24/2017 10:20 AM Dena Almodovar MD [facility] Anitha Chandler [phone] [id number] Follow-up Instructions Return in about 6 months (around 1/24/2018). Follow-up and Disposition History Your Appointments 2/19/2018  2:00 PM  
ESTABLISHED PATIENT with Aimee Burris MD  
[facility] ([facility]) Appt Note: dorothy richards, annual thresh/CL, see Farah  $40 SP  white 1/23/17  
 [address]  
[address] [address] [facility] [address]  
  
    
 2/19/2018  2:00 PM  
PACEMAKER with An Alvarez [facility] ([facility] SCHEDUL[facility]) Appt Note: dorothy richards, annual thresh, CL, see [address]  
[address] [address] [facility] [address]  
  
    
  
 8/7/2017 10:15 AM  
REMOTE OFFICE VISIT with Corrie Tadeo [facility] (DANIEL SCHEDULING) Appt Note: dorothy richards  
 [address] [address]  
[address] [address] [facility] [address]  
  
    
 11/13/2017  9:30 AM  
REMOTE OFFICE VISIT with Corrie Tadeo [facility] (DANIEL SCHEDULING) Appt Note: dorothy richards  
 [address]  
[phone] Upcoming Health Maintenance Date Due Hepatitis C Screening 1960 DTaP/Tdap/Td series (1 - Tdap) 6/26/1983 FOBT Q 1 YEAR AGE 50-75 6/26/2012 INFLUENZA AGE 9 TO ADULT 8/1/2017 Allergies as of 7/24/2017  Review Complete On: 7/24/2017 By: Dena Almodovar MD  
  
 Severity Noted Reaction Type Reactions Silvadene [Silver Sulfadiazine]  01/30/2012    Swelling Current Immunizations  Reviewed on 7/26/2012 Name Date Influenza Vaccine (Quad) PF 11/23/2016  4:08 PM  
  
 Not reviewed this visit You Were Diagnosed With   
  
 Codes Comments PAF (paroxysmal atrial fibrillation) ([facility]    -  Primary ICD-10-CM: I48.0 ICD-9-CM: 427.31   
 SVT (supraventricular tachycardia) (HCC)     ICD-10-CM: I47.1 ICD-9-CM: 427.89 Vitals BP Pulse Height(growth percentile) Weight(growth percentile) SpO2 BMI  
 98/70 (BP 1 Location: Right arm, BP Patient Position: Sitting) 75 6' 2\" (1.88 m) 293 lb (132.9 kg) 99% 37.62 kg/m2 Smoking Status Never Smoker Vitals History BMI and BSA Data Body Mass Index Body Surface Area  
 37.62 kg/m 2 2.63 m 2 Preferred Pharmacy Pharmacy Name Phone [id number] [phone] Your Updated Medication List  
  
   
This list is accurate as of: 7/24/17 11:40 AM.  Always use your most recent med list.  
  
  
  
  
 metoprolol tartrate 25 mg tablet Commonly known as:  LOPRESSOR Take 25 mg by mouth two (2) times a day. PRADAXA 150 mg capsule Generic drug:  dabigatran etexilate TAKE 1 CAP BY MOUTH EVERY TWELVE (12) HOURS.  
  
 sotalol 120 mg tablet Commonly known as:  BETAPACE  
TAKE 1 TAB BY MOUTH EVERY TWELVE (12) HOURS. TYLENOL EXTRA STRENGTH 500 mg tablet Generic drug:  acetaminophen Take 1,000 mg by mouth every six (6) hours as needed for Pain. We Performed the Following AMB POC EKG ROUTINE W/ 12 LEADS, INTER & REP [87917 CPT(R)] Follow-up Instructions Return in about 6 months (around 1/24/2018). Patient Instructions Decrease Metoprolol 25 mg twice a day Follow up with Tricia Johnson in 6 months Introducing [facility] & HEALTH SERVICES! Dear Jonathan Bansal: 
Thank you for requesting a [employer] account. Our records indicate that you already have an active [employer] account. You can access your account anytime at [email or web address] Did you know that you can access your hospital and ER discharge instructions at any time in [employer]? You can also review all of your test results from your hospital stay or ER visit. Additional Information If you have questions, please visit the Frequently Asked Questions section of the [employer] website at [email or web address] Remember, [employer] is NOT to be used for urgent needs. For medical emergencies, dial 911. Now available from your iPhone and Android! Please provide this summary of care documentation to your next provider. Your primary care clinician is listed as Jazmine Zuniga. If you have any questions after today's visit, please call [phone].

## 2017-07-24 NOTE — PROGRESS NOTES
HISTORY OF PRESENT ILLNESS  Tilmon Olszewski is a 56 y.o. male. Here initially for cardiac evaluation of atrial fibrillation. This was discovered b his pcp when patient was seen for wrist injurie. Nuclear stress test on 2/12 no ischemia or mi and EF of 51%  Echo on 1/12 normal EF no gross RWMA  24 hours holter on 2/12 afib   Dc cardioversion on 5/12 attempted several times with also the addition of corvert with no restoration of NSR  He has undergone PPM and atrial fibrillation ablation with Dr. Angeles Torres in 8/12     Admitted to [facility] on 11/16 for cp and AF with RVR  Converted with ibutilide and then started on sotalol, also [address] [place] [address] Road started     Nuclear stress test on 11/16:1. No definite evidence of ischemia and/or infarction. 2. LVEF equals 52%. Left ventricular wall motion and thickening is normal.  On 1/17 : ABLATION OF AF  rvr again on [facility] on 7/17 started on metoprolol by Dr. Yue Soria 1 week prior to af with rvr)  PMH: as above and some djd    PSH: none    FH: not significant for early CAD or SCD   SH: no etoh or tobacco, works as a   HPI  Feeling ok no recurrent palpitations with metoprolol but occasional dizziness standing up  Review of Systems   Respiratory: Negative. Cardiovascular: Negative. Neurological: Positive for dizziness. Physical Exam  Physical Exam   Blood pressure 98/70, pulse 75, height 6' 2\" (1.88 m), weight 132.9 kg (293 lb), SpO2 99 %. Constitutional: He is oriented to person, place, and time. He appears well-developed and well-nourished. No distress. HENT: Head: Normocephalic. Eyes: No scleral icterus. Neck: Normal range of motion. Neck supple. No JVD present. No tracheal deviation present. Cardiovascular: Normal rate, regular rhythm, normal heart sounds and intact distal pulses. Exam reveals no gallop and no friction rub. No murmur heard. Pulmonary/Chest: Effort normal and breath sounds normal. No stridor. No respiratory distress, wheezes or  rales.    Abdominal: He exhibits no distension. Musculoskeletal: He exhibits no edema. Neurological: He is alert and oriented to person, place, and time. Coordination normal.   Skin: Skin is warm. No rash noted. Not diaphoretic. No erythema. Psychiatric:  Normal mood and affect. Behavior is normal.   Current Outpatient Prescriptions on File Prior to Visit   Medication Sig Dispense Refill    metoprolol tartrate (LOPRESSOR) 50 mg tablet Take 1 Tab by mouth two (2) times a day. 60 Tab 6    PRADAXA 150 mg capsule TAKE 1 CAP BY MOUTH EVERY TWELVE (12) HOURS. 60 Cap 6    sotalol (BETAPACE) 120 mg tablet TAKE 1 TAB BY MOUTH EVERY TWELVE (12) HOURS. 60 Tab 2    acetaminophen (TYLENOL EXTRA STRENGTH) 500 mg tablet Take 1,000 mg by mouth every six (6) hours as needed for Pain. No current facility-administered medications on file prior to visit. ASSESSMENT and PLAN  PAF: s/p ablation in 2012 and 7/17. Recurrent paf on 7/17 started on metoprolol. His low bp I suspect the cause for occasional dizziness. Decrease metoprolol to 25 mg bid. His ECG shows NSR and FAv with no other intervals abnormality  . His [id number] score remains 0. Continue Pradaxa. No untoward effects thus far. If needed financially may need to change pradaxa to eliquis or xarelto , patient will let me know  Continue sotalol as well  No evidence for HTN, liberalize salt and fluid intake discussed with patient  His cholesterol is closely followed by his PCP  continue routine PPM surveillance. CP: not recurrent , possibly was related to AF with RVR.  Normal nuclear stress test on 11/16  See him back otherwise in 6 months echo on hold at this time

## 2017-08-07 ENCOUNTER — OFFICE VISIT (OUTPATIENT)
Dept: CARDIOLOGY CLINIC | Age: 57
End: 2017-08-07

## 2017-08-07 DIAGNOSIS — Z95.0 CARDIAC PACEMAKER IN SITU: Primary | ICD-10-CM

## 2017-08-16 DIAGNOSIS — I47.1 SVT (SUPRAVENTRICULAR TACHYCARDIA) (HCC): ICD-10-CM

## 2017-08-16 DIAGNOSIS — I48.0 PAF (PAROXYSMAL ATRIAL FIBRILLATION) (HCC): Primary | ICD-10-CM

## 2017-08-16 RX ORDER — SOTALOL HYDROCHLORIDE 120 MG/1
TABLET ORAL
Qty: 60 TAB | Refills: 2 | Status: SHIPPED | OUTPATIENT
Start: 2017-08-16 | End: 2017-11-01 | Stop reason: SDUPTHER

## 2017-08-16 NOTE — LETTER
8/16/2017 2:31 PM 
 
Mr. Wray Carrier [address] You are on a medication which requires routine monitoring of lab work. Your last metabolic panel was on 87/2311 and we recommend this be done every 6 months. Enclosed you will find lab slips which you may take to the [facility] location closest to your residence. Please have them done as soon as you are able, to avoid any delay in our ability to refill your medications. If you have had them completed recently with PCP, please have them faxed to our office at [phone]. Sincerely, Marilyn Smith NP

## 2017-08-26 LAB
BUN SERPL-MCNC: 11 MG/DL (ref 6–24)
BUN/CREAT SERPL: 13 (ref 9–20)
CALCIUM SERPL-MCNC: 9 MG/DL (ref 8.7–10.2)
CHLORIDE SERPL-SCNC: 97 MMOL/L (ref 96–106)
CO2 SERPL-SCNC: 25 MMOL/L (ref 18–29)
CREAT SERPL-MCNC: 0.83 MG/DL (ref 0.76–1.27)
GLUCOSE SERPL-MCNC: 89 MG/DL (ref 65–99)
POTASSIUM SERPL-SCNC: 4.9 MMOL/L (ref 3.5–5.2)
SODIUM SERPL-SCNC: 138 MMOL/L (ref 134–144)

## 2017-08-30 ENCOUNTER — PATIENT MESSAGE (OUTPATIENT)
Dept: CARDIOLOGY CLINIC | Age: 57
End: 2017-08-30

## 2017-09-13 RX ORDER — METOPROLOL TARTRATE 25 MG/1
25 TABLET, FILM COATED ORAL 2 TIMES DAILY
Qty: 60 TAB | Refills: 6 | Status: SHIPPED | OUTPATIENT
Start: 2017-09-13 | End: 2017-09-14 | Stop reason: SDUPTHER

## 2017-09-13 NOTE — TELEPHONE ENCOUNTER
Request for Metoprolol 25 mg BID. Last office visit 1/23/17, next office visit 2/19/18. Refills per verbal order from Dr. Robert Carlos.

## 2017-09-14 RX ORDER — METOPROLOL TARTRATE 25 MG/1
25 TABLET, FILM COATED ORAL 2 TIMES DAILY
Qty: 180 TAB | Refills: 1 | Status: SHIPPED | OUTPATIENT
Start: 2017-09-14 | End: 2018-03-13 | Stop reason: DRUGHIGH

## 2017-11-03 RX ORDER — SOTALOL HYDROCHLORIDE 120 MG/1
TABLET ORAL
Qty: 60 TAB | Refills: 6 | Status: SHIPPED | OUTPATIENT
Start: 2017-11-03 | End: 2018-06-06 | Stop reason: SDUPTHER

## 2017-11-03 NOTE — TELEPHONE ENCOUNTER
Request for sotalol 120mg twice a day. Last office visit 7/24/17, next office visit 2/19/18. Refills per verbal order from Dr. Juan Anderson.

## 2017-11-13 ENCOUNTER — OFFICE VISIT (OUTPATIENT)
Dept: CARDIOLOGY CLINIC | Age: 57
End: 2017-11-13

## 2017-11-13 DIAGNOSIS — Z95.0 CARDIAC PACEMAKER IN SITU: Primary | ICD-10-CM

## 2018-01-23 ENCOUNTER — OFFICE VISIT (OUTPATIENT)
Dept: CARDIOLOGY CLINIC | Age: 58
End: 2018-01-23

## 2018-01-23 VITALS
HEIGHT: 74 IN | HEART RATE: 76 BPM | RESPIRATION RATE: 16 BRPM | OXYGEN SATURATION: 98 % | SYSTOLIC BLOOD PRESSURE: 100 MMHG | BODY MASS INDEX: 39.01 KG/M2 | WEIGHT: 304 LBS | DIASTOLIC BLOOD PRESSURE: 70 MMHG

## 2018-01-23 DIAGNOSIS — I48.0 PAF (PAROXYSMAL ATRIAL FIBRILLATION) (HCC): Primary | ICD-10-CM

## 2018-01-23 NOTE — MR AVS SNAPSHOT
[address] [facility] [address] 
[phone] Patient: Jennie Betancur MRN: [id number] :1960 Visit Information Date & Time Provider Department Dept. Phone Encounter #  
 2018 11:40 AM Donavon Martinez MD [facility] Rosanna Harness [phone] [id number] Your Appointments 2018  2:15 PM  
PACEMAKER with PACEMAKER3SARMAD [facility] ([facility]ANIEL SCHEDULING) Appt Note: mdt ppi, rc, annual thresh/CL, see Farah  $40 SP  white 17; .  
 [address]  
[address] [facility]  
  
    
 2018  2:20 PM  
ESTABLISHED PATIENT with Cody Marie MD  
[facility] ([address]) Appt Note: .  
 [address]  
[address] [address] Upcoming Health Maintenance Date Due Hepatitis C Screening 1960 DTaP/Tdap/Td series (1 - Tdap) 1983 FOBT Q 1 YEAR AGE 50-75 2012 Influenza Age 5 to Adult 2017 Allergies as of 2018  Review Complete On: 2018 By: Ballard Curling Severity Noted Reaction Type Reactions Silvadene [Silver Sulfadiazine]  2012    Swelling Current Immunizations  Reviewed on 2012 Name Date Influenza Vaccine (Quad) PF 2016  4:08 PM  
  
 Not reviewed this visit You Were Diagnosed With   
  
 Codes Comments PA[facility] (paroxysmal atrial fibrillation) ([facility]    -  Primary ICD-10-CM: I48.0 ICD-9-CM: 427.31 Vitals BP Pulse Resp Height(growth percentile) Weight(growth percentile) SpO2  
 100/70 (BP 1 Location: Left arm, BP Patient Position: Sitting) 76 16 6' 2\" (1.88 m) 304 lb (137.9 kg) 98% BMI Smoking Status 39.03 kg/m2 Never Smoker BMI and BSA Data Body Mass Index Body Surface Area 39.03 kg/m 2 2.68 m 2 Preferred Pharmacy Pharmacy Name Phone [id number] [place] [address] [facility] [phone] Your Updated Medication List  
  
   
This list is accurate as of: 1/23/18 12:54 PM.  Always use your most recent med list.  
  
  
  
  
 metoprolol tartrate 25 mg tablet Commonly known as:  LOPRESSOR Take 1 Tab by mouth two (2) times a day. PRADAXA 150 mg capsule Generic drug:  dabigatran etexilate TAKE 1 CAP BY MOUTH EVERY TWELVE (12) HOURS.  
  
 sotalol 120 mg tablet Commonly known as:  BETAPACE  
TAKE 1 TAB BY MOUTH EVERY TWELVE (12) HOURS. TYLENOL EXTRA STRENGTH 500 mg tablet Generic drug:  acetaminophen Take 1,000 mg by mouth every six (6) hours as needed for Pain. We Performed the Following AMB POC EKG ROUTINE W/ 12 LEADS, INTER & REP [67632 CPT(R)] Patient Instructions Follow up with Amos Ochoa in 6 months Introducing [facility] SERVICES! Dear Mariama Cue: 
Thank you for requesting a [facility] account. Our records indicate that you already have an active [facility] account. You can access your account anytime at [email or web address] Did you know that you can access your hospital and ER discharge instructions at any time in [facility]? You can also review all of your test results from your hospital stay or ER visit. Additional Information If you have questions, please visit the Frequently Asked Questions section of the [facility] website at [email or web address] Remember, [facility] is NOT to be used for urgent needs. For medical emergencies, dial 911. Now available from your iPhone and Android! Please provide this summary of care documentation to your next provider. Your primary care clinician is listed as Yuliet Salazar. If you have any questions after today's visit, please call [phone].

## 2018-01-23 NOTE — PROGRESS NOTES
HISTORY OF PRESENT ILLNESS  Azul Benavidez is a 56 y.o. male. Here initially for cardiac evaluation of atrial fibrillation. This was discovered b his pcp when patient was seen for wrist injurie. Nuclear stress test on 2/12 no ischemia or mi and EF of 51%  Echo on 1/12 normal EF no gross RWMA  24 hours holter on 2/12 afib   Dc cardioversion on 5/12 attempted several times with also the addition of corvert with no restoration of NSR  He has undergone PPM and atrial fibrillation ablation with Dr. Hilario Shi in 8/12      Admitted to [facility] on 11/16 for cp and AF with RVR  Converted with ibutilide and then started on sotalol, also [address] started      Nuclear stress test on 11/16:1. No definite evidence of ischemia and/or infarction. 2. LVEF equals 52%. Left ventricular wall motion and thickening is normal.  On 1/17 : ABLATION OF AF  rvr again on [facility] on 7/17 started on metoprolol by Dr. Jose Moralez 1 week prior to af with rvr)  PMH: as above and some djd    PSH: none    FH: not significant for early CAD or SCD   SH: no etoh or tobacco, works as a   HPI  No complaints at all doing well  Review of Systems   Respiratory: Negative. Cardiovascular: Negative. Physical Exam  Physical Exam   Blood pressure 100/70, pulse 76, resp. rate 16, height 6' 2\" (1.88 m), weight 137.9 kg (304 lb), SpO2 98 %. Constitutional: He is oriented to person, place, and time. He appears well-developed and well-nourished. No distress. HENT: Head: Normocephalic. Eyes: No scleral icterus. Neck: Normal range of motion. Neck supple. No JVD present. No tracheal deviation present. Cardiovascular: Normal rate, regular rhythm, normal heart sounds and intact distal pulses. Exam reveals no gallop and no friction rub. No murmur heard. Pulmonary/Chest: Effort normal and breath sounds normal. No stridor. No respiratory distress, wheezes or  rales. Abdominal: He exhibits no distension. Musculoskeletal: He exhibits no edema.    Neurological: He is alert and oriented to person, place, and time. Coordination normal.   Skin: Skin is warm. No rash noted. Not diaphoretic. No erythema. Psychiatric:  Normal mood and affect. Behavior is normal.   Current Outpatient Prescriptions on File Prior to Visit   Medication Sig Dispense Refill    sotalol (BETAPACE) 120 mg tablet TAKE 1 TAB BY MOUTH EVERY TWELVE (12) HOURS. 60 Tab 6    metoprolol tartrate (LOPRESSOR) 25 mg tablet Take 1 Tab by mouth two (2) times a day. 180 Tab 1    PRADAXA 150 mg capsule TAKE 1 CAP BY MOUTH EVERY TWELVE (12) HOURS. 60 Cap 6    acetaminophen (TYLENOL EXTRA STRENGTH) 500 mg tablet Take 1,000 mg by mouth every six (6) hours as needed for Pain. No current facility-administered medications on file prior to visit. ASSESSMENT and PLAN  PAF: s/p ablation in 2012 and 7/17. Recurrent paf on 7/17 started on metoprolol. His ECG shows NSR and FAv with no other intervals abnormality  His [id number] score remains 0. Continue Pradaxa. No untoward effects thus far. Continue sotalol as well  No evidence for HTN, liberalize salt and fluid intake discussed with patient  His cholesterol is closely followed by his PCP  continue routine PPM surveillance. (last check of 11/17 with 2.3% of At/AF  Discussed with him need for exercise and weight loss and decided to loose 15 lbs before next OV  CP: not recurrent , possibly was related to AF with RVR.  Normal nuclear stress test on 11/16  See him back otherwise in 6 months consider echo at the next OV

## 2018-02-07 ENCOUNTER — TELEPHONE (OUTPATIENT)
Dept: CARDIOLOGY CLINIC | Age: 58
End: 2018-02-07

## 2018-02-07 NOTE — TELEPHONE ENCOUNTER
Pt would like to speak with you regarding his heart rate going up and down. It is currently 117. Pt can be reached at [phone] or  [phone].     Thank you,  Omari Padron

## 2018-03-13 ENCOUNTER — TELEPHONE (OUTPATIENT)
Dept: CARDIOLOGY CLINIC | Age: 58
End: 2018-03-13

## 2018-03-13 RX ORDER — METOPROLOL TARTRATE 50 MG/1
50 TABLET ORAL 2 TIMES DAILY
Qty: 60 TAB | Refills: 3 | Status: SHIPPED | OUTPATIENT
Start: 2018-03-13 | End: 2018-06-20 | Stop reason: SDUPTHER

## 2018-03-13 RX ORDER — METOPROLOL TARTRATE 50 MG/1
50 TABLET ORAL 2 TIMES DAILY
COMMUNITY
End: 2018-03-13 | Stop reason: SDUPTHER

## 2018-03-13 NOTE — TELEPHONE ENCOUNTER
Verified patient with two patient identifiers. Spoke with patient yesterday, he said his palpitations getting worse. I asked him to send a remote to [facility] which he did. Showed to Page Whitehead the result and he said to increase Lopressor 50 mg bid and refer to EP for recurrent PAF. Appointment given to see  on April 17,2018 @ 8 AM per Vance Coley. I sent new Rx to pharmacy. Patient verbalized understanding.

## 2018-03-13 NOTE — TELEPHONE ENCOUNTER
Per Dr Marisol David VO discontinue all medication not taken. Verified patient with two patient identifiers.

## 2018-04-13 RX ORDER — DABIGATRAN ETEXILATE 150 MG/1
CAPSULE ORAL
Qty: 180 CAP | Refills: 1 | Status: SHIPPED | OUTPATIENT
Start: 2018-04-13 | End: 2018-11-06 | Stop reason: SDUPTHER

## 2018-04-13 NOTE — TELEPHONE ENCOUNTER
Request for pradaxa 150mg twice a day. Last office visit 1/23/18, next office visit 7/24/18. Refills per verbal order from Dr. Holly Harrell.

## 2018-04-17 ENCOUNTER — CLINICAL SUPPORT (OUTPATIENT)
Dept: CARDIOLOGY CLINIC | Age: 58
End: 2018-04-17

## 2018-04-17 ENCOUNTER — OFFICE VISIT (OUTPATIENT)
Dept: CARDIOLOGY CLINIC | Age: 58
End: 2018-04-17

## 2018-04-17 VITALS
WEIGHT: 293 LBS | DIASTOLIC BLOOD PRESSURE: 62 MMHG | HEIGHT: 74 IN | HEART RATE: 78 BPM | SYSTOLIC BLOOD PRESSURE: 102 MMHG | BODY MASS INDEX: 37.6 KG/M2

## 2018-04-17 DIAGNOSIS — I49.5 SICK SINUS SYNDROME (HCC): ICD-10-CM

## 2018-04-17 DIAGNOSIS — Z95.0 CARDIAC PACEMAKER IN SITU: Primary | ICD-10-CM

## 2018-04-17 DIAGNOSIS — Z79.899 ENCOUNTER FOR MONITORING SOTALOL THERAPY: ICD-10-CM

## 2018-04-17 DIAGNOSIS — Z86.79 S/P ABLATION OF ATRIAL FIBRILLATION: ICD-10-CM

## 2018-04-17 DIAGNOSIS — Z98.890 S/P ABLATION OF ATRIAL FIBRILLATION: ICD-10-CM

## 2018-04-17 DIAGNOSIS — Z51.81 ENCOUNTER FOR MONITORING SOTALOL THERAPY: ICD-10-CM

## 2018-04-17 DIAGNOSIS — I48.0 PAF (PAROXYSMAL ATRIAL FIBRILLATION) (HCC): Primary | ICD-10-CM

## 2018-04-17 DIAGNOSIS — Z95.0 CARDIAC PACEMAKER IN SITU: ICD-10-CM

## 2018-04-17 NOTE — PROGRESS NOTES
Verified patient with two types of identifiers. Verified pharmacy with patient. Verified medications with patient medications bottles.

## 2018-04-17 NOTE — MR AVS SNAPSHOT
[address] [facility] [address] [place] 
[phone] Patient: Dakota Shah MRN: [id number] :1960 Visit Information Date & Time Provider Department Dept. Phone Encounter #  
 2018  8:00 AM Shanna Wooten MD [facility] [place] [phone] [id number] Follow-up Instructions Return in about 1 year (around 2019). Your Appointments 2018  2:45 PM  
PACEMAKER with Lindsey Awad [facility] ([facility]ANIEL SCHEDULING) Appt Note: .; yolanda esparza rc, annual thresh/CL, see Farah $40 SP white 17 r/s from  to  LEFT MESSAGE WITH NEW DATE AND TIME JND 18  
 [address] [place]  
[phone]  
  
   
 [address]  
  
    
 2018  3:00 PM  
ESTABLISHED PATIENT with Shanna Wooten MD  
[facility] ([facility]) Appt Note: yolanda esparza rc, annual thresh/CL, see Farah $40 SP white 17 r/s from  to  LEFT MESSAGE WITH NEW DATE AND TIME JND 18  
 [address] [place] [address]  
[address] [facility]  
  
    
 2018 10:40 AM  
ESTABLISHED PATIENT with Rachel Allan MD  
[facility] ([facility]) Appt Note: 6 month f/u  
 [address] [place]  
[address] [facility] [address] Upcoming Health Maintenance Date Due Hepatitis C Screening 1960 DTaP/Tdap/Td series (1 - Tdap) 1983 FOBT Q 1 YEAR AGE 50-75 2012 Influenza Age 5 to Adult 2017 Allergies as of 2018  Review Complete On: 2018 By: Shanna Wooten MD  
  
 Severity Noted Reaction Type Reactions Silvadene [Silver Sulfadiazine]  2012    Swelling Current Immunizations  Reviewed on 7/26/2012 Name Date Influenza Vaccine (Quad) PF 11/23/2016  4:08 PM  
  
 Not reviewed this visit You Were Diagnosed With   
  
 Codes Comments PAF (paroxysmal atrial fibrillation) ([facility]    -  Primary ICD-10-CM: I48.0 ICD-9-CM: 427.31 Cardiac pacemaker in situ     ICD-10-CM: Z95.0 ICD-9-CM: V45.01 Sick sinus syndrome (HCC)     ICD-10-CM: I49.5 ICD-9-CM: 427.81 S/P ablation of atrial fibrillation     ICD-10-CM: Z98.890, Z86.79 
ICD-9-CM: V45.89 Encounter for monitoring sotalol therapy     ICD-10-CM: Z51.81, Z79.899 ICD-9-CM: V58.83, V58.69 Vitals BP Pulse Height(growth percentile) Weight(growth percentile) BMI Smoking Status 102/62 (BP 1 Location: Right arm, BP Patient Position: Sitting) 78 6' 2\" (1.88 m) 293 lb (132.9 kg) 37.62 kg/m2 Never Smoker Vitals History BMI and BSA Data Body Mass Index Body Surface Area  
 37.62 kg/m 2 2.63 m 2 Preferred Pharmacy Pharmacy Name Phone [id number], [address] [place] [address] [phone] Your Updated Medication List  
  
   
This list is accurate as of 4/17/18  8:32 AM.  Always use your most recent med list.  
  
  
  
  
 dabigatran etexilate 150 mg capsule Commonly known as:  PRADAXA TAKE 1 CAP BY MOUTH EVERY TWELVE (12) HOURS.  
  
 metoprolol tartrate 50 mg tablet Commonly known as:  LOPRESSOR Take 1 Tab by mouth two (2) times a day. sotalol 120 mg tablet Commonly known as:  BETAPACE  
TAKE 1 TAB BY MOUTH EVERY TWELVE (12) HOURS. TYLENOL EXTRA STRENGTH 500 mg tablet Generic drug:  acetaminophen Take 1,000 mg by mouth every six (6) hours as needed for Pain. Follow-up Instructions Return in about 1 year (around 4/17/2019). Patient Instructions You will need to follow up in clinic with Dr. Chica Calle in 12 months. Introducing \[facility] [place] [facility]\"" & HEALTH SERVICES! Dear Metro Neither: Thank you for requesting a L2 account. Our records indicate that you already have an active L2 account. You can access your account anytime at [email or web address] Did you know that you can access your hospital and ER discharge instructions at any time in L2? You can also review all of your test results from your hospital stay or ER visit. Additional Information If you have questions, please visit the Frequently Asked Questions section of the L2 website at [email or web address] Remember, L2 is NOT to be used for urgent needs. For medical emergencies, dial 911. Now available from your iPhone and Android! Please provide this summary of care documentation to your next provider. Your primary care clinician is listed as Yassine Evans. If you have any questions after today's visit, please call [phone].

## 2018-04-17 NOTE — PROGRESS NOTES
Pacemaker check today show that he have the atrial fibrillation burden of 6% and one episode in February 2018 75 hours of atrial fibrillation. The patient still want to wait out for further symptoms and recurrent frequency before he would undergo another ablation.

## 2018-04-17 NOTE — PROGRESS NOTES
Cardiac Electrophysiology Office Note     Subjective:      Jeanine Gaucher is a 56 y.o. male patient who is seen for follow up of atrial fibrillation and pacemaker. He had A fib ablation 1/10/17 (he was admitted with recurrent AF in 2016 and was cardioverted) the patient had the initial atrial fibrillation ablation in 2012. He had been on and was discharged on sotalol and pradaxa. He had an episode of palpitation about 2 months ago. No lightheadedness or dizziness. No reported SOB. No bleeding pradaxa. The other episode of atrial fibrillation for 33 hours when July 2017. He has been lost to follow-up on pacemaker. The last pacemaker check was in November 2017    He is a patient of Dr Graciela Rain and I had done pacemaker for sick sinus syndrome before and I had done AF ablation for him 4 years ago   Admitted 11/21/16 for chest pain and stress test was normal   Pmhx includes atrial fibrillation with RVR, hx of atrial fibrillation ablation 7/25/2012 PAT, NSVT (5/4/16 on device check), SSS s/p pacemaker (Medtronic dual chamber pacemaker implant 8/17/2012), hx of unsuccessful [facility] 5/2012. He had echocardiogram in 2016 normal left ventricular function. There were dilatation of the atria. Nuclear stress test in 2016 show no ischemia    Previous cardiac testing  Echo 2012 LVEF 55 % to 60 %. No RWMA. Ventricular septum: There was a possible very small congenital defect in the perimembranous location. No significant valve abnormalities.    Nuclear stress test 2/2012 no ischemia, LVEF 51%    Patient Active Problem List    Diagnosis Date Noted    S/P ablation of atrial fibrillation 01/10/2017    SVT (supraventricular tachycardia) ([address] 11/22/2016    Pacemaker 08/31/2012    Sick sinus syndrome ([address] 08/17/2012    Cough 08/06/2012    Atelectasis 08/06/2012    Other dyspnea and respiratory abnormality 08/06/2012    Sinus bradycardia on ECG 08/06/2012    Dizziness 06/04/2012    PAF (paroxysmal atrial fibrillation) ([facility] 04/30/2012     Current Outpatient Prescriptions   Medication Sig Dispense Refill    dabigatran etexilate (PRADAXA) 150 mg capsule TAKE 1 CAP BY MOUTH EVERY TWELVE (12) HOURS. 180 Cap 1    metoprolol tartrate (LOPRESSOR) 50 mg tablet Take 1 Tab by mouth two (2) times a day. 60 Tab 3    sotalol (BETAPACE) 120 mg tablet TAKE 1 TAB BY MOUTH EVERY TWELVE (12) HOURS. 60 Tab 6    acetaminophen (TYLENOL EXTRA STRENGTH) 500 mg tablet Take 1,000 mg by mouth every six (6) hours as needed for Pain. Allergies   Allergen Reactions    Silvadene [Silver Sulfadiazine] Swelling     Past Medical History:   Diagnosis Date    Atrial fibrillation [facility])     Pacemaker      Past Surgical History:   Procedure Laterality Date    ABLATION, ATRIAL FIBRILLATION   7/25/2012         ECHO INTRACARDIAC  7/25/2012         EP STUDY W/O INDUCTION  7/25/2012         HX PACEMAKER      HX WISDOM TEETH EXTRACTION      40 yrs ago    INS PPM/ICD LED DUAL ONLY  8/17/2012         NC INTRACARDIAC ELECTROPHYSIOLOGIC 3D MAPPING  7/25/2012         NC LEFT HEART CATH BY TRANSEPTAL PUNCTURE  7/25/2012         CHRISTEL ECHO COLOR FLOW  7/25/2012          Family History   Problem Relation Age of Onset    Hypertension Mother      Social History   Substance Use Topics    Smoking status: Never Smoker    Smokeless tobacco: Never Used    Alcohol use No        Review of Systems:   Constitutional: Negative for fever, chills, weight loss, malaise/fatigue. HEENT: Negative for nosebleeds, vision changes. Respiratory: Negative for cough, hemoptysis, sputum production, and wheezing. Cardiovascular: Negative for chest pain, palpitations, orthopnea, claudication, leg swelling, syncope, and PND. Gastrointestinal: Negative for nausea, vomiting, diarrhea, constipation, blood in stool and melena. Genitourinary: Negative for dysuria, and hematuria. Musculoskeletal: Negative for myalgias, arthralgia. Skin: Negative for rash.    Heme: Does not bleed or bruise easily. Neurological: Negative for speech change and focal weakness     Objective:     Visit Vitals    /62 (BP 1 Location: Right arm, BP Patient Position: Sitting)    Pulse 78    Ht 6' 2\" (1.88 m)    Wt 293 lb (132.9 kg)    BMI 37.62 kg/m2      Physical Exam:   Constitutional: well-developed and well-nourished. No distress. Head: Normocephalic and atraumatic. Eyes: Pupils are equal, round  Neck: supple. No JVD present. Cardiovascular: Normal rate, regular rhythm. Exam reveals no gallop and no friction rub. No murmur heard. Pulmonary/Chest: Effort normal and breath sounds normal. No wheezes. left side   Abdominal: Soft, obese  Musculoskeletal: no edema. Brace left ankle   Neurological: alert,oriented. Skin: Skin is warm and dry. Left-sided pacemaker incision is healed unremarkable  Psychiatric: normal mood and affect. Behavior is normal. Judgment and thought content normal.         Assessment/Plan:       ICD-10-CM ICD-9-CM    1. PAF (paroxysmal atrial fibrillation) ([facility]) I48.0 427.31    2. Cardiac pacemaker in situ Z95.0 V45.01    3. Sick sinus syndrome ([facility]) I49.5 427.81    4. S/P ablation of atrial fibrillation Z98.890 V45.89     Z86.79     5. Encounter for monitoring sotalol therapy Z51.81 V58.83     Z79.899 V58.69    The patient had one episode of atrial fibrillation 2 months ago. He felt a palpitation and assume that was the case. He has 2.3% of atrial fibrillation burden on pacemaker last check in November 2017. He feels that he has not had another episode so far this year so therefore he does not want to change medication or consider ablation again. The patient will continue with anticoagulation and sotalol. I recommend him to have a pacemaker check up today and he should recheck on a pacemaker every 3 months  Follow-up Disposition:  Return in about 1 year (around 4/17/2019).       Thank you for involving me in this patient's care and please call with further concerns or questions. Kyaw Bird M.D.   Electrophysiology/Cardiology  [facility] [place]  [address] [facility], [place] [address]  [address] [place], [address] [place] [address]  [phone]

## 2018-06-06 RX ORDER — SOTALOL HYDROCHLORIDE 120 MG/1
TABLET ORAL
Qty: 60 TAB | Refills: 6 | Status: SHIPPED | OUTPATIENT
Start: 2018-06-06 | End: 2018-07-16

## 2018-06-06 RX ORDER — SOTALOL HYDROCHLORIDE 120 MG/1
TABLET ORAL
Qty: 60 TAB | Refills: 6 | OUTPATIENT
Start: 2018-06-06

## 2018-06-06 NOTE — TELEPHONE ENCOUNTER
Request for sotalol 120mg twice a day. Last office visit 4/17/18, next office visit 5/7/19. Refills per verbal order from Dr. Kang Livingston.

## 2018-07-12 ENCOUNTER — OFFICE VISIT (OUTPATIENT)
Dept: CARDIOLOGY CLINIC | Age: 58
End: 2018-07-12

## 2018-07-12 VITALS
HEART RATE: 73 BPM | DIASTOLIC BLOOD PRESSURE: 68 MMHG | WEIGHT: 304 LBS | SYSTOLIC BLOOD PRESSURE: 102 MMHG | BODY MASS INDEX: 39.01 KG/M2 | HEIGHT: 74 IN

## 2018-07-12 DIAGNOSIS — I49.5 SICK SINUS SYNDROME (HCC): ICD-10-CM

## 2018-07-12 DIAGNOSIS — Z95.0 PACEMAKER: ICD-10-CM

## 2018-07-12 DIAGNOSIS — Z86.79 S/P ABLATION OF ATRIAL FIBRILLATION: ICD-10-CM

## 2018-07-12 DIAGNOSIS — I48.0 PAF (PAROXYSMAL ATRIAL FIBRILLATION) (HCC): Primary | ICD-10-CM

## 2018-07-12 DIAGNOSIS — Z98.890 S/P ABLATION OF ATRIAL FIBRILLATION: ICD-10-CM

## 2018-07-12 NOTE — MR AVS SNAPSHOT
[address] [facility] [address] [place] 
[phone] Patient: Raven Campbell MRN: [id number] :1960 Visit Information Date & Time Provider Department Dept. Phone Encounter #  
 2018 11:40 AM Lucy Morfin MD [facility] [place] [place] [phone] [id number] Your Appointments 2018 10:40 AM  
ESTABLISHED PATIENT with Alisia Nation MD  
[facility] ([facility]) Appt Note: 6 month f/u  
 [address] [place] [address]  
[address] [facility]  
  
    
 2018 11:00 AM  
ESTABLISHED PATIENT with Lucy Morfin MD  
[facility] ([facility]) Appt Note: 2 week f/u from a-fib ablation(EKG) [address] [place] [address]  
[address] [facility] [address]  
  
    
 2019 10:15 AM  
PACEMAKER with Bismark Pringle [facility] ([facility] SCHEDULING) Appt Note: dorothy richards, annual thresh/CL, see [address] [facility] [address] [place]  
[phone]  
  
   
 [address] [address]  
  
    
 2019 10:20 AM  
ESTABLISHED PATIENT with Lucy Morfin MD  
[facility] ([facility]) Appt Note: dorothy richards, annual thresh/CL, see [address] [facility] [address] [place]  
[phone]  
  
    
  
 2018  9:30 AM  
REMOTE OFFICE VISIT with Jayde Sparrow [facility] ([facility]EL SCHEDULING) Appt Note: dorothy richards b 18  
 [address] [place] [address]  
[address] [facility] [address]  
  
    
 10/29/2018 10:00 AM  
REMOTE OFFICE VISIT with Jayde Sparrow [facility] (DANIEL SCHEDULING) Appt Note: yolanda ppi,   
 [address] [facility] 200 [facility]  
[phone]  
  
    
 2/4/2019  9:30 AM  
REMOTE OFFICE VISIT with Wagner Clemens [facility] ([facility]) Appt Note: yolanda esparza,   
 [address] [facility] [address] [place] [address]  
[phone] Upcoming Health Maintenance Date Due Hepatitis C Screening 1960 DTaP/Tdap/Td series (1 - Tdap) 6/26/1983 FOBT Q 1 YEAR AGE 50-75 6/26/2012 Influenza Age 5 to Adult 8/1/2018 Allergies as of 7/12/2018  Review Complete On: 7/12/2018 By: Mary Mendoza LPN Severity Noted Reaction Type Reactions Silvadene [Silver Sulfadiazine]  01/30/2012    Swelling Current Immunizations  Reviewed on 7/26/2012 Name Date Influenza Vaccine (Quad) PF 11/23/2016  4:08 PM  
  
 Not reviewed this visit You Were Diagnosed With   
  
 Codes Comments PAF (paroxysmal atrial fibrillation) ([facility]    -  Primary ICD-10-CM: I48.0 ICD-9-CM: 427.31 Vitals BP Pulse Height(growth percentile) Weight(growth percentile) BMI Smoking Status 102/68 (BP 1 Location: Left arm, BP Patient Position: Sitting) 73 6' 2\" (1.88 m) 304 lb (137.9 kg) 39.03 kg/m2 Never Smoker Vitals History BMI and BSA Data Body Mass Index Body Surface Area 39.03 kg/m 2 2.68 m 2 Preferred Pharmacy Pharmacy Name Phone [id number]. [phone] Your Updated Medication List  
  
   
This list is accurate as of 7/12/18 12:18 PM.  Always use your most recent med list.  
  
  
  
  
 dabigatran etexilate 150 mg capsule Commonly known as:  PRADAXA TAKE 1 CAP BY MOUTH EVERY TWELVE (12) HOURS.  
  
 metoprolol tartrate 50 mg tablet Commonly known as:  LOPRESSOR  
TAKE 1 TAB BY MOUTH TWO (2) TIMES A DAY. sotalol 120 mg tablet Commonly known as:  BETAPACE  
TAKE 1 TAB BY MOUTH EVERY TWELVE (12) HOURS. TYLENOL EXTRA STRENGTH 500 mg tablet Generic drug:  acetaminophen Take 1,000 mg by mouth every six (6) hours as needed for Pain. We Performed the Following AMB POC EKG ROUTINE W/ 12 LEADS, INTER & REP [20658 CPT(R)] To-Do List   
 07/16/2018 8:15 AM  
  Appointment with [facility] ANESTHESIA; CATH ROOM 2 [facility] at [facility] ([phone]) Patient Instructions Your AV node ablation procedure has been scheduled for 7/16/18 at 8:00am, at [facility]. 
 
Please report to Admitting Department by 6:15am, or 2 hours prior to your scheduled procedure. Please bring a list of your current medications and medication bottles, if able, to the hospital on this day. You will be unable to drive after your procedure so please make sure to bring someone with you to your procedure. You will need to have nothing to eat or drink after midnight, the night prior to your procedure. You may have small sips of water, if needed, to take with your medication. You should not stop your medications prior to your scheduled procedure. After your procedure, you will need to follow up with Dr. Talib Jimenez. Your follow-up appointment has been scheduled for 7/31/18 at 11:00am.  
 
 
 
 
 
 
  
Introducing [facility] SERVICES! Dear Jordin Hinojosa: 
Thank you for requesting a [employer] account. Our records indicate that you already have an active [employer] account. You can access your account anytime at [email or web address] Did you know that you can access your hospital and ER discharge instructions at any time in [employer]? You can also review all of your test results from your hospital stay or ER visit. Additional Information If you have questions, please visit the Frequently Asked Questions section of the [employer] website at [email or web address] Remember, Cleartrip is NOT to be used for urgent needs. For medical emergencies, dial 911. Now available from your iPhone and Android! Please provide this summary of care documentation to your next provider. Your primary care clinician is listed as Alejandro Cook. If you have any questions after today's visit, please call [phone].

## 2018-07-12 NOTE — PROGRESS NOTES
Cardiac Electrophysiology Office Note     Subjective:      Jazz Dyer is a 66 y.o. male patient who is seen for follow up of atrial fibrillation and pacemaker. The patient is here with his sister today. He was planned for A. fib ablation because of the recurrent A. fib with RVR and he is symptomatic despite beta-blocker and sotalol. He said with light activity, sometimes he feels his heart rate up to about 130 bpm  He had A fib ablation 1/10/17 (he was admitted with recurrent AF in 2016 and was cardioverted) the patient had the initial atrial fibrillation ablation in 2012. He had been on and was discharged on sotalol and pradaxa. No bleeding pradaxa. He is a patient of Dr Millicent Davila and I had done pacemaker for sick sinus syndrome before and I had done AF ablation for him 4 years ago   Admitted 11/21/16 for chest pain and stress test was normal   Pmhx includes atrial fibrillation with RVR, hx of atrial fibrillation ablation 7/25/2012 PAT, NSVT (5/4/16 on device check), SSS s/p pacemaker (Medtronic dual chamber pacemaker implant 8/17/2012), hx of unsuccessful [facility] 5/2012. He had echocardiogram in 2016 normal left ventricular function. There were dilatation of the atria. Nuclear stress test in 2016 show no ischemia    Previous cardiac testing  Echo 2012 LVEF 55 % to 60 %. No RWMA. Ventricular septum: There was a possible very small congenital defect in the perimembranous location. No significant valve abnormalities.    Nuclear stress test 2/2012 no ischemia, LVEF 51%    Patient Active Problem List    Diagnosis Date Noted    S/P ablation of atrial fibrillation 01/10/2017    SVT (supraventricular tachycardia) ([address] 11/22/2016    Pacemaker 08/31/2012    Sick sinus syndrome ([address] 08/17/2012    Cough 08/06/2012    Atelectasis 08/06/2012    Other dyspnea and respiratory abnormality 08/06/2012    Sinus bradycardia on ECG 08/06/2012    Dizziness 06/04/2012    PAF (paroxysmal atrial fibrillation) ([facility] 04/30/2012     Current Outpatient Prescriptions   Medication Sig Dispense Refill    metoprolol tartrate (LOPRESSOR) 50 mg tablet TAKE 1 TAB BY MOUTH TWO (2) TIMES A DAY. 60 Tab 2    sotalol (BETAPACE) 120 mg tablet TAKE 1 TAB BY MOUTH EVERY TWELVE (12) HOURS. 60 Tab 6    dabigatran etexilate (PRADAXA) 150 mg capsule TAKE 1 CAP BY MOUTH EVERY TWELVE (12) HOURS. 180 Cap 1    acetaminophen (TYLENOL EXTRA STRENGTH) 500 mg tablet Take 1,000 mg by mouth every six (6) hours as needed for Pain. Allergies   Allergen Reactions    Silvadene [Silver Sulfadiazine] Swelling     Past Medical History:   Diagnosis Date    Atrial fibrillation [facility])     Pacemaker      Past Surgical History:   Procedure Laterality Date    ABLATION, ATRIAL FIBRILLATION   7/25/2012         ECHO INTRACARDIAC  7/25/2012         EP STUDY W/O INDUCTION  7/25/2012         HX PACEMAKER      HX WISDOM TEETH EXTRACTION      40 yrs ago    INS PPM/ICD LED DUAL ONLY  8/17/2012         GA INTRACARDIAC ELECTROPHYSIOLOGIC 3D MAPPING  7/25/2012         GA LEFT HEART CATH BY TRANSEPTAL PUNCTURE  7/25/2012         CHRISTEL ECHO COLOR FLOW  7/25/2012          Family History   Problem Relation Age of Onset    Hypertension Mother      Social History   Substance Use Topics    Smoking status: Never Smoker    Smokeless tobacco: Never Used    Alcohol use No        Review of Systems:   Constitutional: Negative for fever, chills, weight loss, +malaise/fatigue. HEENT: Negative for nosebleeds, vision changes. Respiratory: Negative for cough, hemoptysis, sputum production, and wheezing. Cardiovascular: Negative for chest pain, + palpitations, orthopnea, claudication, leg swelling, syncope, and PND. Gastrointestinal: Negative for nausea, vomiting, diarrhea, constipation, blood in stool and melena. Genitourinary: Negative for dysuria, and hematuria. Musculoskeletal: Negative for myalgias, arthralgia. Skin: Negative for rash.    Heme: Does not bleed or bruise easily. Neurological: Negative for speech change and focal weakness     Objective:     Visit Vitals    /68 (BP 1 Location: Left arm, BP Patient Position: Sitting)    Pulse 73    Ht 6' 2\" (1.88 m)    Wt 304 lb (137.9 kg)    BMI 39.03 kg/m2      Physical Exam:   Constitutional: well-developed and well-nourished. No distress. Head: Normocephalic and atraumatic. Eyes: Pupils are equal, round  Neck: supple. No JVD present. Cardiovascular: Normal rate, regular rhythm. Exam reveals no gallop and no friction rub. No murmur heard. Pulmonary/Chest: Effort normal and breath sounds normal. No wheezes. left side   Abdominal: Soft, obese  Musculoskeletal: no edema. Brace left ankle   Neurological: alert,oriented. Skin: Skin is warm and dry. Left-sided pacemaker incision is healed unremarkable  Psychiatric: normal mood and affect. Behavior is normal. Judgment and thought content normal.       ECG   Assessment/Plan:       ICD-10-CM ICD-9-CM    1. PAF (paroxysmal atrial fibrillation) ([facility]) I48.0 427.31 AMB POC EKG ROUTINE W/ 12 LEADS, INTER & REP   2. Sick sinus syndrome ([facility]) I49.5 427.81    3. S/P ablation of atrial fibrillation Z98.890 V45.89     Z86.79     4. Pacemaker Z95.0 V45.01       The patient had called back and wanted to schedule the ablation of atrial fibrillation. However I review his Medtronic pacemaker function and the battery is about 5-8 years left. The patient has had increasing burden of atrial fibrillation 6% and long episodes to 70+ hrs and occasional rapid ventricular rate. He symptomatic despite metoprolol and sotalol. He has had ablations before. I think it is is going to be difficult to maintain his sinus rhythm despite another ablation therefore I think for the long-term I recommend AV node ablation for ventricular rate control. He may continue with Pradaxa and may stop sotalol afterwards. I have discussed with his brother over the phone as well.   They will think about it and get back with me but likely they said they will proceed with AV node ablation instead    Thank you for involving me in this patient's care and please call with further concerns or questions. Sherie Torres M.D.   Electrophysiology/Cardiology  [facility] [place]  [address], [facility] [address] [facility], Henokrenetta Bain [address] [place] [address]  [phone]

## 2018-07-12 NOTE — PATIENT INSTRUCTIONS
Your AV node ablation procedure has been scheduled for 7/16/18 at 8:00am, at [facility].    Please report to Admitting Department by 6:15am, or 2 hours prior to your scheduled procedure. Please bring a list of your current medications and medication bottles, if able, to the hospital on this day. You will be unable to drive after your procedure so please make sure to bring someone with you to your procedure. You will need to have nothing to eat or drink after midnight, the night prior to your procedure. You may have small sips of water, if needed, to take with your medication. You should not stop your medications prior to your scheduled procedure. After your procedure, you will need to follow up with Dr. Lyndon Grant.  Your follow-up appointment has been scheduled for 7/31/18 at 11:00am.

## 2018-07-13 RX ORDER — SODIUM CHLORIDE 0.9 % (FLUSH) 0.9 %
5-10 SYRINGE (ML) INJECTION EVERY 8 HOURS
Status: CANCELLED | OUTPATIENT
Start: 2018-07-13

## 2018-07-13 RX ORDER — SODIUM CHLORIDE 0.9 % (FLUSH) 0.9 %
5-10 SYRINGE (ML) INJECTION AS NEEDED
Status: CANCELLED | OUTPATIENT
Start: 2018-07-13

## 2018-07-16 ENCOUNTER — HOSPITAL ENCOUNTER (OUTPATIENT)
Dept: CARDIAC CATH/INVASIVE PROCEDURES | Age: 58
Discharge: HOME | End: 2018-07-16
Attending: INTERNAL MEDICINE | Admitting: INTERNAL MEDICINE
Payer: COMMERCIAL

## 2018-07-16 VITALS
BODY MASS INDEX: 38.5 KG/M2 | WEIGHT: 300 LBS | RESPIRATION RATE: 18 BRPM | OXYGEN SATURATION: 97 % | HEART RATE: 80 BPM | HEIGHT: 74 IN | SYSTOLIC BLOOD PRESSURE: 115 MMHG | DIASTOLIC BLOOD PRESSURE: 89 MMHG | TEMPERATURE: 97.7 F

## 2018-07-16 PROBLEM — I44.2 COMPLETE AV BLOCK DUE TO AV NODAL ABLATION (HCC): Status: ACTIVE | Noted: 2018-07-16

## 2018-07-16 PROBLEM — I97.190 COMPLETE AV BLOCK DUE TO AV NODAL ABLATION (HCC): Status: ACTIVE | Noted: 2018-07-16

## 2018-07-16 LAB
ANION GAP SERPL CALC-SCNC: 8 MMOL/L (ref 5–15)
BUN SERPL-MCNC: 10 MG/DL (ref 6–20)
BUN/CREAT SERPL: 11 (ref 12–20)
CALCIUM SERPL-MCNC: 8.1 MG/DL (ref 8.5–10.1)
CHLORIDE SERPL-SCNC: 109 MMOL/L (ref 97–108)
CO2 SERPL-SCNC: 25 MMOL/L (ref 21–32)
CREAT SERPL-MCNC: 0.87 MG/DL (ref 0.7–1.3)
ERYTHROCYTE [DISTWIDTH] IN BLOOD BY AUTOMATED COUNT: 12.9 % (ref 11.5–14.5)
GLUCOSE SERPL-MCNC: 99 MG/DL (ref 65–100)
HCT VFR BLD AUTO: 43.3 % (ref 36.6–50.3)
HGB BLD-MCNC: 14.5 G/DL (ref 12.1–17)
MCH RBC QN AUTO: 32.4 PG (ref 26–34)
MCHC RBC AUTO-ENTMCNC: 33.5 G/DL (ref 30–36.5)
MCV RBC AUTO: 96.9 FL (ref 80–99)
NRBC # BLD: 0 K/UL (ref 0–0.01)
NRBC BLD-RTO: 0 PER 100 WBC
PLATELET # BLD AUTO: 182 K/UL (ref 150–400)
PMV BLD AUTO: 8.9 FL (ref 8.9–12.9)
POTASSIUM SERPL-SCNC: 4.3 MMOL/L (ref 3.5–5.1)
RBC # BLD AUTO: 4.47 M/UL (ref 4.1–5.7)
SODIUM SERPL-SCNC: 142 MMOL/L (ref 136–145)
WBC # BLD AUTO: 6.1 K/UL (ref 4.1–11.1)

## 2018-07-16 PROCEDURE — 36415 COLL VENOUS BLD VENIPUNCTURE: CPT | Performed by: INTERNAL MEDICINE

## 2018-07-16 PROCEDURE — 77030010872 HC CBL EP BSC -C

## 2018-07-16 PROCEDURE — 99152 MOD SED SAME PHYS/QHP 5/>YRS: CPT

## 2018-07-16 PROCEDURE — 85027 COMPLETE CBC AUTOMATED: CPT | Performed by: INTERNAL MEDICINE

## 2018-07-16 PROCEDURE — C1733 CATH, EP, OTHR THAN COOL-TIP: HCPCS

## 2018-07-16 PROCEDURE — 74011000250 HC RX REV CODE- 250: Performed by: INTERNAL MEDICINE

## 2018-07-16 PROCEDURE — C1894 INTRO/SHEATH, NON-LASER: HCPCS

## 2018-07-16 PROCEDURE — 80048 BASIC METABOLIC PNL TOTAL CA: CPT | Performed by: INTERNAL MEDICINE

## 2018-07-16 PROCEDURE — 74011250636 HC RX REV CODE- 250/636: Performed by: INTERNAL MEDICINE

## 2018-07-16 PROCEDURE — 77030039046 HC PAD DEFIB RADIOTRNSPNT CNMD -B

## 2018-07-16 PROCEDURE — 77030003390 HC NDL ANGI MRTM -A

## 2018-07-16 RX ORDER — HEPARIN SODIUM 200 [USP'U]/100ML
1000 INJECTION, SOLUTION INTRAVENOUS AS NEEDED
Status: DISCONTINUED | OUTPATIENT
Start: 2018-07-16 | End: 2018-07-16 | Stop reason: HOSPADM

## 2018-07-16 RX ORDER — MIDAZOLAM HYDROCHLORIDE 1 MG/ML
1-10 INJECTION, SOLUTION INTRAMUSCULAR; INTRAVENOUS
Status: DISCONTINUED | OUTPATIENT
Start: 2018-07-16 | End: 2018-07-16 | Stop reason: HOSPADM

## 2018-07-16 RX ORDER — SODIUM CHLORIDE 0.9 % (FLUSH) 0.9 %
5-10 SYRINGE (ML) INJECTION AS NEEDED
Status: DISCONTINUED | OUTPATIENT
Start: 2018-07-16 | End: 2018-07-16 | Stop reason: HOSPADM

## 2018-07-16 RX ORDER — NALOXONE HYDROCHLORIDE 0.4 MG/ML
0.4 INJECTION, SOLUTION INTRAMUSCULAR; INTRAVENOUS; SUBCUTANEOUS AS NEEDED
Status: DISCONTINUED | OUTPATIENT
Start: 2018-07-16 | End: 2018-07-16 | Stop reason: HOSPADM

## 2018-07-16 RX ORDER — SODIUM CHLORIDE 0.9 % (FLUSH) 0.9 %
5-10 SYRINGE (ML) INJECTION EVERY 8 HOURS
Status: DISCONTINUED | OUTPATIENT
Start: 2018-07-16 | End: 2018-07-16 | Stop reason: HOSPADM

## 2018-07-16 RX ORDER — FENTANYL CITRATE 50 UG/ML
25-200 INJECTION, SOLUTION INTRAMUSCULAR; INTRAVENOUS
Status: DISCONTINUED | OUTPATIENT
Start: 2018-07-16 | End: 2018-07-16 | Stop reason: HOSPADM

## 2018-07-16 RX ORDER — LIDOCAINE HYDROCHLORIDE 10 MG/ML
10-30 INJECTION INFILTRATION; PERINEURAL
Status: DISCONTINUED | OUTPATIENT
Start: 2018-07-16 | End: 2018-07-16 | Stop reason: HOSPADM

## 2018-07-16 RX ADMIN — LIDOCAINE HYDROCHLORIDE 10 ML: 10 INJECTION, SOLUTION INFILTRATION; PERINEURAL at 08:44

## 2018-07-16 RX ADMIN — MIDAZOLAM HYDROCHLORIDE 1 MG: 1 INJECTION, SOLUTION INTRAMUSCULAR; INTRAVENOUS at 08:56

## 2018-07-16 RX ADMIN — FENTANYL CITRATE 25 MCG: 50 INJECTION, SOLUTION INTRAMUSCULAR; INTRAVENOUS at 08:57

## 2018-07-16 RX ADMIN — MIDAZOLAM HYDROCHLORIDE 2 MG: 1 INJECTION, SOLUTION INTRAMUSCULAR; INTRAVENOUS at 08:42

## 2018-07-16 RX ADMIN — FENTANYL CITRATE 50 MCG: 50 INJECTION, SOLUTION INTRAMUSCULAR; INTRAVENOUS at 08:42

## 2018-07-16 NOTE — PROGRESS NOTES
3143:  TRANSFER - IN REPORT:    Verbal report received from [address] on Filomena Mckinney , from the Cardiac Cath lab, for routine progression of care. Report consisted of patients Situation, Background, Assessment and Recommendations(SBAR). Information from the following report(s) Procedure Summary, Intake/Output, MAR and Recent Results was reviewed with the receiving clinician. Opportunity for questions and clarification was provided. Assessment completed upon patients arrival to [address] [place] [address] and care assumed. Cardiac Cath Lab Recovery Arrival Note:     Filomena Mckinney arrived to [facility] [place] recovery area. Patient procedure= AV node ablation. Patient on cardiac monitor, non-invasive blood pressure, Patient status doing well without problems. Patient is A&Ox 4. Patient reports no complaints. Procedure site without any bleeding and no hematoma.

## 2018-07-16 NOTE — PROCEDURES
ELECTROPHYSIOLOGY (EP) REPORT  DATE OF PROCEDURE: 7/16/2018    PROCEDURE:  AV frank ablation and His bundle recording with fluoroscopy. pacemaker was re-interrogated and reprogrammed before and after the ablation. HISTORY:  This is a 66 y. o.man with atrial fibrillation ablation in the past and recurrent rapid atrial fibrillation despite medication. He has pacemaker in place for sick sinus syndrome. He and his siblings agree to av node ablation. The risks and benefits of AV frank ablation has been discussed with the patient and consent was signed. The patient is now ready to proceed. He is on pradaxa    PROCEDURE IN DETAIL:  After informed written consent had been obtained, the patient was brought to the Electrophysiology Suite. His Medtronic dual chamber pacer is programmed to VVI at 30 beats per minute and then conscious sedation was then started. The patient was prepped and draped in the usual sterile fashion. Local anesthesia with 2% Xylocaine was given in the right inguinal area. The access was through the venous system in the right femoral vein and one long guidewire was then advanced without difficulty on the first attempt of access. Over the guidewire, an 8-Setswana long SR0 introducer was placed. Under the guidance of fluoroscopy, the 7-Setswana 4 mm tip [employer] mapping ablation catheter was advanced. Where the His bundle was recorded, RF energy was delivered for one attempt at 50 casiano and 70 degrees Celsius for 60 seconds and within the first 10 seconds, the patient developed complete heart block. The patent was ventricular paced. Junctional rhythm was occasional     At this time after the next 10 minutes of monitoring, there was no reconduction and the patient appeared to be dependent on ventricular pacing. The pacing threshold of the pacer was reinterrogated and it showed 1.75 volts at 0.4 ms. The device was programmed to VVIR with the low rate of  beats per minute. EBL None  COMPLICATIONS:  None. The patient remained hemodynamically stable. Specimen removed: NONE  The HV interval is 54 ms.     ASSESSMENT AND PLAN:  He will go home with brother and stop sotalol

## 2018-07-16 NOTE — PROGRESS NOTES
Cardiac Cath Lab Procedure Area Arrival Note:    Genaro Breaux arrived to Cardiac Cath Lab, Procedure Area. Patient identifiers verified with NAME and DATE OF BIRTH. Procedure verified with patient. Consent forms verified. Allergies verified. Patient informed of procedure and plan of care. Questions answered with review. Patient voiced understanding of procedure and plan of care. Patient on cardiac monitor, non-invasive blood pressure, SPO2 monitor. On or O2 @ 2 lpm via NC.  IV of NS on pump at 25 ml/hr. Patient status doing well without problems. Patient is A&Ox 4. Patient reports no pain. Patient medicated during procedure with orders obtained and verified by Dr. Mayela Fontanez. Refer to patients Cardiac Cath Lab PROCEDURE REPORT for vital signs, assessment, status, and response during procedure, printed at end of case. Printed report on chart or scanned into chart. TRANSFER - OUT REPORT:    Verbal report given to Marti Menjivar on Genaro Breaux being transferred to cath lab holding for routine progression of care       Report consisted of patients Situation, Background, Assessment and   Recommendations(SBAR). Information from the following report(s) Procedure Summary was reviewed with the receiving nurse. Opportunity for questions and clarification was provided.

## 2018-07-16 NOTE — IP AVS SNAPSHOT
[address] [facility] [address] 
[phone] Patient: Soheila Mac MRN: [id number] :1960 A check marion indicates which time of day the medication should be taken. My Medications CONTINUE taking these medications Instructions Each Dose to Equal  
 Morning Noon Evening Bedtime  
 dabigatran etexilate 150 mg capsule Commonly known as:  PRADAXA Your last dose was: Your next dose is: TAKE 1 CAP BY MOUTH EVERY TWELVE (12) HOURS.  
     
   
   
   
  
 metoprolol tartrate 50 mg tablet Commonly known as:  LOPRESSOR Your last dose was: Your next dose is: TAKE 1 TAB BY MOUTH TWO (2) TIMES A DAY. TYLENOL EXTRA STRENGTH 500 mg tablet Generic drug:  acetaminophen Your last dose was: Your next dose is: Take 1,000 mg by mouth every six (6) hours as needed for Pain. 1000 mg  
    
   
   
   
  
  
STOP taking these medications   
 sotalol 120 mg tablet Commonly known as:  Vernadine Letha

## 2018-07-16 NOTE — PROGRESS NOTES
1135: Head of bed elevated 30degrees right groin site without bleeding and no hematoma. 1216:  5796 Alessandra Osbornevie ambulated @ 9871 (time) approximately 100 feet. Patient tolerated ambulation without adverse advents. right groin (right/left, groin/arm)  without bleeding or hematoma noted. I have reviewed discharge instructions with the patient. The patient verbalized understanding.

## 2018-07-16 NOTE — PROGRESS NOTES
Cardiac Cath Lab Recovery Arrival Note:      Josephine Palomo arrived to Cardiac Cath Lab, Recovery Area. Staff introduced to patient. Patient identifiers verified with NAME and DATE OF BIRTH. Procedure verified with patient. Consent forms reviewed and signed by patient or authorized representative and verified. Allergies verified. Patient informed of procedure and plan of care. Questions answered with review. Patient prepped for procedure, per orders from physician, prior to arrival.    Patient on cardiac monitor, non-invasive blood pressure, SPO2 monitor. Patient is A&Ox 4. Patient reports no complaints. Patient in stretcher, in low position, with side rails up, call bell within reach, patient instructed to call of assistance as needed. Patient prep in: [facility] Recovery Area, Bed# 6. Family in: waiting room. Prep by: SANTHOSH Parker

## 2018-07-16 NOTE — IP AVS SNAPSHOT
[address] [address] 
[phone] Patient: Anusha Beltre MRN: [id number] :1960 About your hospitalization You were admitted on:  2018 You last received care in the:  [address] [facility] CATH LAB You were discharged on:  2018 Why you were hospitalized Your primary diagnosis was:  Complete Av Block Due To Av Sami Ablation ([facility]) Follow-up Information None Your Scheduled Appointments 2018  9:30 AM EDT  
REMOTE OFFICE VISIT with Bland Carrel [facility] ([facility]EL SCHEDULING) [address] [place]  
[phone] 2018 10:40 AM EDT  
ESTABLISHED PATIENT with George Gracia MD  
[facility] ([facility]) [address] [place]  
[phone] 2018 11:00 AM EDT  
ESTABLISHED PATIENT with Darby Gant MD  
[facility] ([facility]) [address] [place]  
[phone] Discharge Orders None A check marion indicates which time of day the medication should be taken. My Medications CONTINUE taking these medications Instructions Each Dose to Equal  
 Morning Noon Evening Bedtime  
 dabigatran etexilate 150 mg capsule Commonly known as:  PRADAXA Your last dose was: Your next dose is: TAKE 1 CAP BY MOUTH EVERY TWELVE (12) HOURS.  
     
   
   
   
  
 metoprolol tartrate 50 mg tablet Commonly known as:  LOPRESSOR Your last dose was: Your next dose is: TAKE 1 TAB BY MOUTH TWO (2) TIMES A DAY. TYLENOL EXTRA STRENGTH 500 mg tablet Generic drug:  acetaminophen Your last dose was: Your next dose is: Take 1,000 mg by mouth every six (6) hours as needed for Pain. 1000 mg  
    
   
   
   
  
  
STOP taking these medications   
 sotalol 120 mg tablet Commonly known as:  Ivanna Low Discharge Instructions Resume pradaxa tomorrow Patient Instructions Post-EP study and ablation 1. No heavy lifting or exercises for 1 week. This includes the following: Do not push or move furniture, jog or run 2. Do not drive for 2 days. 3.  Call Dr. Kasi Fowler at [phone] if you experience any of the following symptoms: 
Dizziness, lightheadedness, fainting spells Lack of energy Shortness of breath Rapid heart rate Chest or muscle twitches Blurry vision, double vision, weakness, numbness Nausea, vomiting Fever Bleeding in the stool, black stool Leg swelling, pain 4. Follow-up with Dr. Kasi Fowler DISCHARGE INSTRUCTIONS If possible, have someone stay with you for the first night. It is normal to feel tired for the first couple of days. Take it easy and follow your physicians instructions on activity. CHECK THE CATHETER INSERTION SITE DAILY: If bleeding at the cath site occurs, take a clean washcloth and apply direct pressure just above the puncture site for at least 15 minutes. Call 911 immediately if the bleeding is not controlled. Continue to apply direct pressure until an ambulance gets to your location. Do not try to drive yourself or have someone else drive you to the hospital.  Have the ambulance bring you to the emergency room. You may shower 24 hours after your procedure. Gently remove the bandage during showering. Wash with soap and water and pat dry. To prevent infection, keep the groin area/insertion site clean and dry. Do not apply powders, creams, lotions, or ointments to the site for 5 days. You may cover the site with a fresh Band-Aid each day until well healed.    
 
CALL THE PHYSICIAN: 
 ? If the site becomes red, swollen, or feels warm to the touch, or is healing poorly ? If you note any large/extending bruise, fever >101.0 or chills ? If your extremity has numbness, tingling, discoloration, abnormal swelling, tightness or pain ? If you have difficulty with urination or develop nausea, vomiting, or severe abdominal pain ACTIVITY for the first 24-48 hours, or as instructed by your physician: 
? No lifting, pushing or pulling over 10 pounds and no straining the insertion site. Do not lift grocery bags or the garbage can; do not run the vacuum  or  for 7 days. ? You may start walking short distances the day of your procedure. Gradually increase as tolerated each day. Current activity recommendations are 30 minutes of exercise at least 5 days a week. Work up to this as you recover. ? Avoid walking outside in extremes of heat or cold. Walk inside (at home, at the mall, or at a large store) when it is cold and windy or hot and humid. THINGS TO KEEP IN MIND:  
? Do not drive, operate any machinery, or sign any legal documents for 24 hours after your procedure, or as directed by your physician. You must have someone drive you home after your procedure. ? Limit the number of times you go up and down the stairs ? Take rests and pace yourself with activity ? Be careful and do not strain with bowel movements MEDICATIONS: 
? Take all medications as prescribed ? Call your physician if you have any questions ? Keep an updated list of your medications with you at all times and give a list to your primary physician and pharmacist 
? You may use Tylenol 325mg 1-2 tablets every 6 hours as needed for pain or discomfort, unless otherwise instructed. If you have significant discomfort more than 48 hours after your procedure, please call your physicians office.  
 
 
 
Remember:  IN CASE OF BLEEDING: KEEP FIRM PRESSURE ON THE PROCEDURE SITE AND CALL 911 IF NOT CONTROLLED 
 
 Future Appointments Date Time Provider Jam Muelleri 7/23/2018 9:30 AM REMOTE1, SARM[address] SANCHEZ SCHED  
7/24/2018 10:40 AM Gloria Sanchez  [address]  
7/31/2018 11:00 AM Real Seat,  [address]  
10/29/2018 10:00 AM REMOTE1, [address]  
2/4/2019 9:30 AM REMOTE1, [address]  
5/7/2019 10:15 AM PACEMAKER3, [address]  
5/7/2019 10:20 AM Eddie Gtz  [address] [facility]! Dear Lisette Velazquez: 
Thank you for requesting a [employer] account. Our records indicate that you already have an active [employer] account. You can access your account anytime at [email or web address] Did you know that you can access your hospital and ER discharge instructions at any time in [employer]? You can also review all of your test results from your hospital stay or ER visit. Additional Information If you have questions, please visit the Frequently Asked Questions section of the [employer] website at [email or web address] Remember, [employer] is NOT to be used for urgent needs. For medical emergencies, dial 911. Now available from your iPhone and Android! Introducing Jonathan Hayes As a [employer] patient, I wanted to make you aware of our electronic visit tool called Jonathan Freddy. [employer] 24/7 allows you to connect within minutes with a medical provider 24 hours a day, seven days a week via a mobile device or tablet or logging into a secure website from your computer. You can access Jonathan Hayes from anywhere in the [place].  
 
A virtual visit might be right for you when you have a simple condition and feel like you just dont want to get out of bed, or cant get away from work for an appointment, when your regular [employer] provider is not available (evenings, weekends or holidays), or when youre out of town and need minor care. Electronic visits cost only $49 and if the [employer] 24/7 provider determines a prescription is needed to treat your condition, one can be electronically transmitted to a nearby pharmacy*. Please take a moment to enroll today if you have not already done so. The enrollment process is free and takes just a few minutes. To enroll, please download the [employer] 24/7 sarah to your tablet or phone, or visit [email or web address] to enroll on your computer. And, as an [address] [place] [address] patient with a [employer] account, the results of your visits will be scanned into your electronic medical record and your primary care provider will be able to view the scanned results. We urge you to continue to see your regular [employer] provider for your ongoing medical care. And while your primary care provider may not be the one available when you seek a [employer] virtual visit, the peace of mind you get from getting a real diagnosis real time can be priceless. For more information on [employer], view our Frequently Asked Questions (FAQs) at [email or web address]. Sincerely, 
 
Amrita Jaquez MD 
Chief Medical Officer [address] *:  certain medications cannot be prescribed via [employer] Providers Seen During Your Hospitalization Provider Specialty Primary office phone Summer Lawson MD Cardiology [phone] Your Primary Care Physician (PCP) Primary Care Physician Office Phone Office Fax Loy Landa [phone] You are allergic to the following Allergen Reactions Silvadene (Silver Sulfadiazine) Swelling Recent Documentation Height Weight BMI Smoking Status 1.88 m 136.1 kg 38.52 kg/m2 Never Smoker Emergency Contacts Name Discharge Info Relation Home Work Mobile [address] CAREGIVER [3] Mother [14] [phone] Patient Belongings The following personal items are in your possession at time of discharge: 
     Visual Aid: Glasses Please provide this summary of care documentation to your next provider. Signatures-by signing, you are acknowledging that this After Visit Summary has been reviewed with you and you have received a copy. Patient Signature:  ____________________________________________________________ Date:  ____________________________________________________________  
  
Janyth Mins Provider Signature:  ____________________________________________________________ Date:  ____________________________________________________________

## 2018-07-16 NOTE — DISCHARGE INSTRUCTIONS
Resume pradaxa tomorrow        Patient Instructions Post-EP study and ablation    1. No heavy lifting or exercises for 1 week. This includes the following:  Do not push or move furniture, jog or run    2. Do not drive for 2 days. 3.  Call Dr. Mir Sher at [phone] if you experience any of the following symptoms:  Dizziness, lightheadedness, fainting spells  Lack of energy  Shortness of breath  Rapid heart rate  Chest or muscle twitches  Blurry vision, double vision, weakness, numbness  Nausea, vomiting  Fever  Bleeding in the stool, black stool  Leg swelling, pain    4. Follow-up with Dr. Dean Duncan    If possible, have someone stay with you for the first night. It is normal to feel tired for the first couple of days. Take it easy and follow your physicians instructions on activity. CHECK THE CATHETER INSERTION SITE DAILY:    If bleeding at the cath site occurs, take a clean washcloth and apply direct pressure just above the puncture site for at least 15 minutes. Call 911 immediately if the bleeding is not controlled. Continue to apply direct pressure until an ambulance gets to your location. Do not try to drive yourself or have someone else drive you to the hospital.  Have the ambulance bring you to the emergency room. You may shower 24 hours after your procedure. Gently remove the bandage during showering. Wash with soap and water and pat dry. To prevent infection, keep the groin area/insertion site clean and dry. Do not apply powders, creams, lotions, or ointments to the site for 5 days. You may cover the site with a fresh Band-Aid each day until well healed. CALL THE PHYSICIAN:  ? If the site becomes red, swollen, or feels warm to the touch, or is healing poorly    ? If you note any large/extending bruise, fever >101.0 or chills  ? If your extremity has numbness, tingling, discoloration, abnormal swelling, tightness or pain   ?  If you have difficulty with urination or develop nausea, vomiting, or severe abdominal pain    ACTIVITY for the first 24-48 hours, or as instructed by your physician:  ? No lifting, pushing or pulling over 10 pounds and no straining the insertion site. Do not lift grocery bags or the garbage can; do not run the vacuum  or  for 7 days. ? You may start walking short distances the day of your procedure. Gradually increase as tolerated each day. Current activity recommendations are 30 minutes of exercise at least 5 days a week. Work up to this as you recover. ? Avoid walking outside in extremes of heat or cold. Walk inside (at home, at the mall, or at a large store) when it is cold and windy or hot and humid. THINGS TO KEEP IN MIND:   ? Do not drive, operate any machinery, or sign any legal documents for 24 hours after your procedure, or as directed by your physician. You must have someone drive you home after your procedure. ? Limit the number of times you go up and down the stairs  ? Take rests and pace yourself with activity  ? Be careful and do not strain with bowel movements    MEDICATIONS:  ? Take all medications as prescribed  ? Call your physician if you have any questions  ? Keep an updated list of your medications with you at all times and give a list to your primary physician and pharmacist  ? You may use Tylenol 325mg 1-2 tablets every 6 hours as needed for pain or discomfort, unless otherwise instructed. If you have significant discomfort more than 48 hours after your procedure, please call your physicians office.         Remember:  IN CASE OF BLEEDING: KEEP FIRM PRESSURE ON THE PROCEDURE SITE AND CALL 911 IF NOT CONTROLLED    Future Appointments  Date Time Provider Jam Echevarria   7/23/2018 9:30 AM [place] PETERSON   7/24/2018 10:40 AM Jw Fiore  [address]   7/31/2018 11:00 AM Aminah Amaral  [address]   10/29/2018 10:00 AM REMOTE1, [address] [address] 2/4/2019 9:30 AM REMOTE1, [address]   5/7/2019 10:15 AM PACEMAKER3, [address]   5/7/2019 10:20 AM Kaitlynn Gonzalez  [address]

## 2018-07-23 ENCOUNTER — OFFICE VISIT (OUTPATIENT)
Dept: CARDIOLOGY CLINIC | Age: 58
End: 2018-07-23

## 2018-07-23 DIAGNOSIS — Z95.0 CARDIAC PACEMAKER IN SITU: Primary | ICD-10-CM

## 2018-07-24 ENCOUNTER — OFFICE VISIT (OUTPATIENT)
Dept: CARDIOLOGY CLINIC | Age: 58
End: 2018-07-24

## 2018-07-24 VITALS
OXYGEN SATURATION: 96 % | RESPIRATION RATE: 20 BRPM | HEART RATE: 84 BPM | HEIGHT: 74 IN | WEIGHT: 304.6 LBS | DIASTOLIC BLOOD PRESSURE: 88 MMHG | SYSTOLIC BLOOD PRESSURE: 124 MMHG | BODY MASS INDEX: 39.09 KG/M2

## 2018-07-24 DIAGNOSIS — Z86.79 S/P ABLATION OF ATRIAL FIBRILLATION: Primary | ICD-10-CM

## 2018-07-24 DIAGNOSIS — Z98.890 S/P ABLATION OF ATRIAL FIBRILLATION: Primary | ICD-10-CM

## 2018-07-24 NOTE — PROGRESS NOTES
HISTORY OF PRESENT ILLNESS  Junito Poole is a 66 y.o. male.h/o ATRIAL FIBRILLATION,  This was discovered by his pcp when patient was seen for wrist injurie. Nuclear stress test on 2/12 no ischemia or mi and EF of 51%  Echo on 1/12 normal EF no gross RWMA  24 hours holter on 2/12 afib   Dc cardioversion on 5/12 attempted several times with also the addition of corvert with no restoration of NSR  He has undergone PPM and atrial fibrillation ablation with Dr. Kurt Johnston in 8/12      Admitted to [facility] on 11/16 for cp and AF with RVR  Converted with ibutilide and then started on sotalol, also [facility] started      Nuclear stress test on 11/16:1. No definite evidence of ischemia and/or infarction. 2. LVEF equals 52%. Left ventricular wall motion and thickening is normal.  On 1/17 : ABLATION OF AF    Recurrent PAF on 2018 off on despite increase in medications  Eventually he underwent on 7/18 with Dr. Farah:AV frank ablation and His bundle recording with fluoroscopy. pacemaker was re-interrogated and reprogrammed before and after the ablation. PMH: as above and some djd    PSH: none    FH: not significant for early CAD or SCD   SH: no etoh or tobacco, works as a   HPI  Doing much better after av frank ablation! Energy much better and sob resolved  Review of Systems   Respiratory: Negative. Cardiovascular: Negative. Visit Vitals    /88 (BP 1 Location: Left arm)    Pulse 84    Resp 20    Ht 6' 2\" (1.88 m)    Wt 138.2 kg (304 lb 9.6 oz)    SpO2 96%    BMI 39.11 kg/m2       Physical Exam   Neck: No JVD present. Carotid bruit is not present. Cardiovascular: Normal rate. A regularly irregular rhythm present. Exam reveals distant heart sounds. Pulmonary/Chest: Effort normal and breath sounds normal.   Abdominal: Soft. Musculoskeletal: He exhibits no edema. Psychiatric: He has a normal mood and affect.      Current Outpatient Prescriptions on File Prior to Visit   Medication Sig Dispense Refill    metoprolol tartrate (LOPRESSOR) 50 mg tablet TAKE 1 TAB BY MOUTH TWO (2) TIMES A DAY. 60 Tab 2    dabigatran etexilate (PRADAXA) 150 mg capsule TAKE 1 CAP BY MOUTH EVERY TWELVE (12) HOURS. 180 Cap 1    acetaminophen (TYLENOL EXTRA STRENGTH) 500 mg tablet Take 1,000 mg by mouth every six (6) hours as needed for Pain. No current facility-administered medications on file prior to visit. ASSESSMENT and PLAN  PAF: s/p ablation in 2012 and 7/17. Recurrent paf on 7/17 and 2018 eventually he underwent successful av frank ablation and sotalol was stopped. He feels much better now and he is completely asymptomatic    His ECG shows AF and V PACED    His [id number] score is 1 (*HTN) . Continue Pradaxa. No untoward effects thus far. His cholesterol is closely followed by his PCP    HTN: reasonable on current dose of metoprolol no change at this time    continue routine PPM surveillance. Discussed with him need for exercise and weight loss and decided to loose 15 lbs before next OV    CP: not recurrent , possibly was related to AF with RVR.  Normal nuclear stress test on 11/16    See him back otherwise in 12 months consider echo at the next OV

## 2018-07-24 NOTE — MR AVS SNAPSHOT
[address] [facility] [address] [place] 
[phone] Patient: Fiorella Prince MRN: [id number] :1960 Visit Information Date & Time Provider Department Dept. Phone Encounter #  
 2018 10:40 AM Nahun Olson MD [facility] Jonathon Reeder [phone] [id number] Your Appointments 2018 11:00 AM  
ESTABLISHED PATIENT with Betsy Blizzard, MD  
[facility] ([address]) Appt Note: 2 week f/u from a-fib ablation(EKG) [address] 350 [facility] [place]  
[address] [place] [address]  
  
    
 2019 10:15 AM  
PACEMAKER with Santana Bowling [facility] (DANIEL SCHEDULING) Appt Note: dorothy richards, annual thresh/CL, see [address] [facility] [address] [facility]  
[phone]  
  
   
 [address] [place] [address]  
  
    
 2019 10:20 AM  
ESTABLISHED PATIENT with Betsy Blizzard, MD  
[facility] ([address]) Appt Note: dorothy richards, annual thresh/CL, see [address] [facility] [address] [facility]  
[phone]  
  
    
  
 10/29/2018 10:00 AM  
REMOTE OFFICE VISIT with Minda Tristan [facility] (DANIEL SCHEDULING) Appt Note: dorothy richards  
 [address] [place] [address] [address] [id number]  
[address] [place] [address]  
  
    
 2019  9:30 AM  
REMOTE OFFICE VISIT with Minda Tristan [facility] (DANIEL SCHEDULING) Appt Note: dorothy richards  
 [address] [place] [address] [address] [facility]  
[phone] Upcoming Health Maintenance Date Due Hepatitis C Screening 1960 DTaP/Tdap/Td series (1 - Tdap) 1983 FOBT Q 1 YEAR AGE 50-75 2012 Influenza Age 5 to Adult 2018 Allergies as of 7/24/2018  Review Complete On: 7/24/2018 By: Odalys Blankenship Severity Noted Reaction Type Reactions Silvadene [Silver Sulfadiazine]  01/30/2012    Swelling Current Immunizations  Reviewed on 7/26/2012 Name Date Influenza Vaccine (Quad) PF 11/23/2016  4:08 PM  
  
 Not reviewed this visit You Were Diagnosed With   
  
 Codes Comments S/P ablation of atrial fibrillation    -  Primary ICD-10-CM: Z98.890, Z86.79 
ICD-9-CM: V45.89 Vitals BP Pulse Resp Height(growth percentile) Weight(growth percentile) SpO2  
 124/88 (BP 1 Location: Left arm) 84 20 6' 2\" (1.88 m) 304 lb 9.6 oz (138.2 kg) 96% BMI Smoking Status 39.11 kg/m2 Never Smoker Vitals History BMI and BSA Data Body Mass Index Body Surface Area  
 39.11 kg/m 2 2.69 m 2 Preferred Pharmacy Pharmacy Name Phone [id number] Smith [address] [facility]. [phone] Your Updated Medication List  
  
   
This list is accurate as of 7/24/18 11:19 AM.  Always use your most recent med list.  
  
  
  
  
 dabigatran etexilate 150 mg capsule Commonly known as:  PRADAXA TAKE 1 CAP BY MOUTH EVERY TWELVE (12) HOURS.  
  
 metoprolol tartrate 50 mg tablet Commonly known as:  LOPRESSOR  
TAKE 1 TAB BY MOUTH TWO (2) TIMES A DAY. TYLENOL EXTRA STRENGTH 500 mg tablet Generic drug:  acetaminophen Take 1,000 mg by mouth every six (6) hours as needed for Pain. We Performed the Following AMB POC EKG ROUTINE W/ 12 LEADS, INTER & REP [56988 CPT(R)] Patient Instructions Follow up with Bashir Dutta in 1 year Introducing [facility]! Dear Balbir Sidhu: 
Thank you for requesting a Canvace account. Our records indicate that you already have an active [employer] account. You can access your account anytime at [email or web address] Did you know that you can access your hospital and ER discharge instructions at any time in "[employer]? You can also review all of your test results from your hospital stay or ER visit. Additional Information If you have questions, please visit the Frequently Asked Questions section of the "[employer] website at [email or web address] Remember, "[employer] is NOT to be used for urgent needs. For medical emergencies, dial 911. Now available from your iPhone and Android! Please provide this summary of care documentation to your next provider. Your primary care clinician is listed as Jordy Duron. If you have any questions after today's visit, please call [phone].

## 2018-07-31 ENCOUNTER — OFFICE VISIT (OUTPATIENT)
Dept: CARDIOLOGY CLINIC | Age: 58
End: 2018-07-31

## 2018-07-31 ENCOUNTER — CLINICAL SUPPORT (OUTPATIENT)
Dept: CARDIOLOGY CLINIC | Age: 58
End: 2018-07-31

## 2018-07-31 VITALS
DIASTOLIC BLOOD PRESSURE: 68 MMHG | HEIGHT: 74 IN | BODY MASS INDEX: 38.5 KG/M2 | WEIGHT: 300 LBS | HEART RATE: 86 BPM | SYSTOLIC BLOOD PRESSURE: 112 MMHG

## 2018-07-31 DIAGNOSIS — Z79.01 CHRONIC ANTICOAGULATION: ICD-10-CM

## 2018-07-31 DIAGNOSIS — Z98.890 S/P ABLATION OF ATRIAL FIBRILLATION: ICD-10-CM

## 2018-07-31 DIAGNOSIS — I49.5 SICK SINUS SYNDROME (HCC): ICD-10-CM

## 2018-07-31 DIAGNOSIS — I48.92 ATRIAL FLUTTER, UNSPECIFIED TYPE (HCC): ICD-10-CM

## 2018-07-31 DIAGNOSIS — I48.0 PAF (PAROXYSMAL ATRIAL FIBRILLATION) (HCC): ICD-10-CM

## 2018-07-31 DIAGNOSIS — I97.190 COMPLETE AV BLOCK DUE TO AV NODAL ABLATION (HCC): ICD-10-CM

## 2018-07-31 DIAGNOSIS — Z86.79 S/P ABLATION OF ATRIAL FIBRILLATION: ICD-10-CM

## 2018-07-31 DIAGNOSIS — Z95.0 CARDIAC PACEMAKER IN SITU: Primary | ICD-10-CM

## 2018-07-31 DIAGNOSIS — Z98.890 HISTORY OF CARDIOVERSION: ICD-10-CM

## 2018-07-31 DIAGNOSIS — I44.2 COMPLETE AV BLOCK DUE TO AV NODAL ABLATION (HCC): ICD-10-CM

## 2018-07-31 PROBLEM — E66.01 SEVERE OBESITY (BMI 35.0-39.9): Status: ACTIVE | Noted: 2018-07-31

## 2018-07-31 RX ORDER — METOPROLOL TARTRATE 25 MG/1
25 TABLET, FILM COATED ORAL 2 TIMES DAILY
Qty: 60 TAB | Refills: 5 | Status: SHIPPED | OUTPATIENT
Start: 2018-07-31 | End: 2019-01-19 | Stop reason: SDUPTHER

## 2018-07-31 NOTE — MR AVS SNAPSHOT
[address] [facility] [address] [place] 
[phone] Patient: Le Alicia MRN: [id number] :1960 Visit Information Date & Time Provider Department Dept. Phone Encounter #  
 2018 11:00 AM Buck Hinojosa MD [facility] Franck Rodarte [phone] [id number] Your Appointments 2019 10:15 AM  
PACEMAKER with [email or web address] [facility] (DANIEL SCHEDULING) Appt Note: dorothy richards, annual thresh/CL, see [address] [facility] [address] [place]  
[phone]  
  
   
 [address] [address]  
  
    
 2019 10:20 AM  
ESTABLISHED PATIENT with Buck Hinojosa MD  
[facility] ([facility]) Appt Note: dorothy richards, annual thresh/CL, see [address] [facility] [address] [place]  
[address] [address]  
  
    
 2019 10:20 AM  
ESTABLISHED PATIENT with Crescencio Jurado MD  
[facility] ([facility]) Appt Note: 1 yr f/u  
 [address] [facility] [address]  
[address] [address]  
  
    
  
 10/29/2018 10:00 AM  
REMOTE OFFICE VISIT with Armando Bass [facility] (DANIEL SCHEDULING) Appt Note: dorothy richards  
 [address] [facility] [address] [address]  
[address] [address]  
  
    
 2019  9:30 AM  
REMOTE OFFICE VISIT with Armando Bass [facility] (DANIEL SCHEDULING) Appt Note: dorothy richards  
 [address] [facility] [address] [place]  
[phone] Upcoming Health Maintenance Date Due Hepatitis C Screening 1960 DTaP/Tdap/Td series (1 - Tdap) 1983 FOBT Q 1 YEAR AGE 50-75 2012 Influenza Age 5 to Adult 8/1/2018 Allergies as of 7/31/2018  Review Complete On: 7/31/2018 By: Ron Arguello MD  
  
 Severity Noted Reaction Type Reactions Silvadene [Silver Sulfadiazine]  01/30/2012    Swelling Current Immunizations  Reviewed on 7/26/2012 Name Date Influenza Vaccine (Quad) PF 11/23/2016  4:08 PM  
  
 Not reviewed this visit Vitals BP Pulse Height(growth percentile) Weight(growth percentile) BMI Smoking Status 112/68 (BP 1 Location: Left arm, BP Patient Position: Sitting) 86 6' 2\" (1.88 m) 300 lb (136.1 kg) 38.52 kg/m2 Never Smoker Vitals History BMI and BSA Data Body Mass Index Body Surface Area 38.52 kg/m 2 2.67 m 2 Preferred Pharmacy Pharmacy Name Phone [id number] Alvarez [address] [facility]. [phone] Your Updated Medication List  
  
   
This list is accurate as of 7/31/18 11:10 AM.  Always use your most recent med list.  
  
  
  
  
 dabigatran etexilate 150 mg capsule Commonly known as:  PRADAXA TAKE 1 CAP BY MOUTH EVERY TWELVE (12) HOURS.  
  
 metoprolol tartrate 25 mg tablet Commonly known as:  LOPRESSOR Take 1 Tab by mouth two (2) times a day. TYLENOL EXTRA STRENGTH 500 mg tablet Generic drug:  acetaminophen Take 1,000 mg by mouth every six (6) hours as needed for Pain. Prescriptions Sent to Pharmacy Refills  
 metoprolol tartrate (LOPRESSOR) 25 mg tablet 5 Sig: Take 1 Tab by mouth two (2) times a day. Class: Normal  
 Pharmacy: [address] [place] [address] [address] [place] [address]. Ph #: [phone] Route: Oral  
  
Patient Instructions Decrease Metoprolol to 25 mg twice daily Introducing [facility] & HEALTH SERVICES! Dear Trina Shi: 
Thank you for requesting a TradeCloud[employer]nl account. Our records indicate that you already have an active [email or web address]nl account. You can access your account anytime at [email or web address]. [email or web address] Did you know that you can access your hospital and ER discharge instructions at any time in Booodl? You can also review all of your test results from your hospital stay or ER visit. Additional Information If you have questions, please visit the Frequently Asked Questions section of the [employer] website at [email or web address] Remember, [employer] is NOT to be used for urgent needs. For medical emergencies, dial 911. Now available from your iPhone and Android! Please provide this summary of care documentation to your next provider. Your primary care clinician is listed as Burt Bhutanese. If you have any questions after today's visit, please call [phone].

## 2018-08-02 NOTE — PROGRESS NOTES
Cardiac Electrophysiology Office Note     Subjective:      Annie Mera is a 66 y.o. male patient who is seen for follow up of atrial fibrillation and pacemaker. He has done well after the AV node ablation. The patient had recurrent atrial fibrillation with rapid ventricular rate despite A fib ablation 1/10/17 (he was admitted with recurrent AF in 2016 and was cardioverted) the patient had the initial atrial fibrillation ablation in 2012. He feels much better without the sotalol  No bleeding pradaxa. He is a patient of Dr Sheppard Nurse and I had done pacemaker for sick sinus syndrome before and I had done AF ablation for him 4 years ago   Admitted 11/21/16 for chest pain and stress test was normal   Pmhx includes atrial fibrillation with RVR, hx of atrial fibrillation ablation 7/25/2012 PAT, NSVT (5/4/16 on device check), SSS s/p pacemaker (Medtronic dual chamber pacemaker implant 8/17/2012), hx of unsuccessful [facility] 5/2012. He had echocardiogram in 2016 normal left ventricular function. There were dilatation of the atria. Nuclear stress test in 2016 show no ischemia    Previous cardiac testing  Echo 2012 LVEF 55 % to 60 %. No RWMA. Ventricular septum: There was a possible very small congenital defect in the perimembranous location. No significant valve abnormalities.    Nuclear stress test 2/2012 no ischemia, LVEF 51%    Patient Active Problem List    Diagnosis Date Noted    Severe obesity (BMI 35.0-39.9) ([address] 07/31/2018    Complete AV block due to AV frank ablation ([address] 07/16/2018    S/P ablation of atrial fibrillation 01/10/2017    SVT (supraventricular tachycardia) ([address] 11/22/2016    Pacemaker 08/31/2012    Sick sinus syndrome ([address] 08/17/2012    Cough 08/06/2012    Atelectasis 08/06/2012    Other dyspnea and respiratory abnormality 08/06/2012    Sinus bradycardia on ECG 08/06/2012    Dizziness 06/04/2012    Persistent atrial fibrillation with rapid ventricular response ([address] 04/30/2012 Current Outpatient Prescriptions   Medication Sig Dispense Refill    metoprolol tartrate (LOPRESSOR) 25 mg tablet Take 1 Tab by mouth two (2) times a day. 60 Tab 5    dabigatran etexilate (PRADAXA) 150 mg capsule TAKE 1 CAP BY MOUTH EVERY TWELVE (12) HOURS. 180 Cap 1    acetaminophen (TYLENOL EXTRA STRENGTH) 500 mg tablet Take 1,000 mg by mouth every six (6) hours as needed for Pain. Allergies   Allergen Reactions    Silvadene [Silver Sulfadiazine] Swelling     Past Medical History:   Diagnosis Date    Atrial fibrillation [facility])     Pacemaker      Past Surgical History:   Procedure Laterality Date    ABLATION, ATRIAL FIBRILLATION   7/25/2012         ECHO INTRACARDIAC  7/25/2012         EP STUDY W/O INDUCTION  7/25/2012         HX PACEMAKER      HX WISDOM TEETH EXTRACTION      40 yrs ago    INS PPM/ICD LED DUAL ONLY  8/17/2012         HI ICAR CATHETER ABLATION ATRIOVENTR NODE FUNCTION  7/16/2018         HI INTRACARDIAC ELECTROPHYSIOLOGIC 3D MAPPING  7/25/2012         HI LEFT HEART CATH BY TRANSEPTAL PUNCTURE  7/25/2012         CHRISTEL ECHO COLOR FLOW  7/25/2012          Family History   Problem Relation Age of Onset    Hypertension Mother      Social History   Substance Use Topics    Smoking status: Never Smoker    Smokeless tobacco: Never Used    Alcohol use No        Review of Systems:   Constitutional: Negative for fever, chills, weight loss  HEENT: Negative for nosebleeds, vision changes. Respiratory: Negative for cough, hemoptysis, sputum production, and wheezing. Cardiovascular: Negative for chest pain, orthopnea, claudication, leg swelling, syncope, and PND. Gastrointestinal: Negative for nausea, vomiting, diarrhea, constipation, blood in stool and melena. Genitourinary: Negative for dysuria, and hematuria. Musculoskeletal: Negative for myalgias, arthralgia. Skin: Negative for rash. Heme: Does not bleed or bruise easily.    Neurological: Negative for speech change and focal weakness     Objective:     Visit Vitals    /68 (BP 1 Location: Left arm, BP Patient Position: Sitting)    Pulse 86    Ht 6' 2\" (1.88 m)    Wt 300 lb (136.1 kg)    BMI 38.52 kg/m2      Physical Exam:   Constitutional: well-developed and well-nourished. No distress. Head: Normocephalic and atraumatic. Eyes: Pupils are equal, round  Neck: supple. No JVD present. Cardiovascular: Normal rate, regular rhythm. Exam reveals no gallop and no friction rub. No murmur heard. Pulmonary/Chest: Effort normal and breath sounds normal. No wheezes. left side   Abdominal: Soft, obese  Musculoskeletal: no edema. Brace left ankle   Neurological: alert,oriented. Skin: Skin is warm and dry. Left-sided pacemaker incision is healed unremarkable  Psychiatric: normal mood and affect. Behavior is normal. Judgment and thought content normal.       Assessment/Plan:       ICD-10-CM ICD-9-CM    1. Cardiac pacemaker in situ Z95.0 V45.01    2. S/P ablation of atrial fibrillation Z98.890 V45.89     Z86.79     3. PAF (paroxysmal atrial fibrillation) ([facility]) I48.0 427.31    4. Atrial flutter, unspecified type ([facility] I48.92 427.32    5. Chronic anticoagulation Z79.01 V58.61    6. History of cardioversion Z98.890 V15.1    7. Sick sinus syndrome ([facility]) I49.5 427.81    8. Complete AV block due to AV frank ablation ([facility]) I97.190 997.1     I44.2 426.0       Symptoms of palpitation and fatigue have resolved after the AV node ablation and discontinuation of sotalol. The patient denies hypertension and he has normal blood pressure today show we will stop the beta-blocker metoprolol and I asked him to check the blood pressure at home. I have asked the patient to continue to follow-up with Dr. Lorena Suarez and with me in the pacemaker clinic  The pacemaker has been rechecked and show proper function. Thank you for involving me in this patient's care and please call with further concerns or questions.       Hal Gandhi M.D.  Electrophysiology/Cardiology  [facility] [place]  [address] [facility], [facility] [address] [place] [address]  [phone]

## 2018-09-12 ENCOUNTER — TELEPHONE (OUTPATIENT)
Dept: CARDIOLOGY CLINIC | Age: 58
End: 2018-09-12

## 2018-09-12 NOTE — TELEPHONE ENCOUNTER
Faxed Dr Ilan Rosario 7/31/18 office note to Dr Shreya Brown at fax number [phone]. Fax confirmation received.

## 2018-11-06 RX ORDER — DABIGATRAN ETEXILATE 150 MG/1
CAPSULE ORAL
Qty: 180 CAP | Refills: 2 | Status: SHIPPED | OUTPATIENT
Start: 2018-11-06 | End: 2019-07-18 | Stop reason: SDUPTHER

## 2018-11-14 ENCOUNTER — OFFICE VISIT (OUTPATIENT)
Dept: CARDIOLOGY CLINIC | Age: 58
End: 2018-11-14

## 2018-11-14 DIAGNOSIS — Z95.0 CARDIAC PACEMAKER IN SITU: Primary | ICD-10-CM

## 2018-12-12 ENCOUNTER — HOSPITAL ENCOUNTER (OUTPATIENT)
Dept: GENERAL RADIOLOGY | Age: 58
Discharge: HOME | End: 2018-12-12
Payer: COMMERCIAL

## 2018-12-12 DIAGNOSIS — R05.9 COUGH: ICD-10-CM

## 2018-12-12 PROCEDURE — 71046 X-RAY EXAM CHEST 2 VIEWS: CPT

## 2019-01-21 RX ORDER — METOPROLOL TARTRATE 25 MG/1
TABLET, FILM COATED ORAL
Qty: 60 TAB | Refills: 6 | Status: SHIPPED | OUTPATIENT
Start: 2019-01-21 | End: 2019-08-03 | Stop reason: SDUPTHER

## 2019-01-21 NOTE — TELEPHONE ENCOUNTER
Request for metoprolol 25mg twice a day. Last office visit 7/31/18, next office visit 10/3/19. Refills per verbal order from Dr. Sahil Duron.

## 2019-02-20 ENCOUNTER — OFFICE VISIT (OUTPATIENT)
Dept: CARDIOLOGY CLINIC | Age: 59
End: 2019-02-20

## 2019-02-20 DIAGNOSIS — Z95.0 CARDIAC PACEMAKER IN SITU: Primary | ICD-10-CM

## 2019-05-29 ENCOUNTER — OFFICE VISIT (OUTPATIENT)
Dept: CARDIOLOGY CLINIC | Age: 59
End: 2019-05-29

## 2019-05-29 DIAGNOSIS — Z95.0 CARDIAC PACEMAKER IN SITU: Primary | ICD-10-CM

## 2019-07-18 RX ORDER — DABIGATRAN ETEXILATE 150 MG/1
CAPSULE ORAL
Qty: 180 CAP | Refills: 1 | Status: SHIPPED | OUTPATIENT
Start: 2019-07-18 | End: 2019-12-27

## 2019-07-18 NOTE — TELEPHONE ENCOUNTER
Requested Prescriptions     Signed Prescriptions Disp Refills    dabigatran etexilate (PRADAXA) 150 mg capsule 180 Cap 1     Sig: TAKE ONE CAPSULE BY MOUTH EVERY 12 HOURS     Authorizing Provider: Zoe David     Ordering User: Rosa Elena Brian     Verbal order per Dr. Vanice Hodgkins.     Future Appointments   Date Time Provider Jam Swathi   8/19/2019 11:20 AM Valerie Bass  [address]   10/3/2019 11:00 AM Olivia PETERSON   10/3/2019 11:20 AM Torrey Pate  [address]

## 2019-08-07 RX ORDER — METOPROLOL TARTRATE 25 MG/1
TABLET, FILM COATED ORAL
Qty: 180 TAB | Refills: 1 | Status: SHIPPED | OUTPATIENT
Start: 2019-08-07 | End: 2020-01-27

## 2019-08-07 NOTE — TELEPHONE ENCOUNTER
Request for Lopresor 25 mg BID. Last office visit 7/31/18, next office visit 10/3/19. Refills per verbal order from Dr. Manjula Youngblood.

## 2019-08-19 ENCOUNTER — OFFICE VISIT (OUTPATIENT)
Dept: CARDIOLOGY CLINIC | Age: 59
End: 2019-08-19

## 2019-08-19 ENCOUNTER — CLINICAL SUPPORT (OUTPATIENT)
Dept: CARDIOLOGY CLINIC | Age: 59
End: 2019-08-19

## 2019-08-19 VITALS
SYSTOLIC BLOOD PRESSURE: 110 MMHG | DIASTOLIC BLOOD PRESSURE: 80 MMHG | RESPIRATION RATE: 18 BRPM | HEART RATE: 76 BPM | OXYGEN SATURATION: 98 % | BODY MASS INDEX: 39.27 KG/M2 | HEIGHT: 74 IN | WEIGHT: 306 LBS

## 2019-08-19 DIAGNOSIS — I48.0 PAF (PAROXYSMAL ATRIAL FIBRILLATION) (HCC): Primary | ICD-10-CM

## 2019-08-19 DIAGNOSIS — Z95.0 CARDIAC PACEMAKER IN SITU: Primary | ICD-10-CM

## 2019-08-19 NOTE — PATIENT INSTRUCTIONS
Schedule echocardiogram in the next couple of weeks. Our office will call you with results. Follow up with Dr. Lamont Dunn in 1 year.

## 2019-08-19 NOTE — PROGRESS NOTES
Visit Vitals  /80 (BP 1 Location: Left arm, BP Patient Position: Sitting)   Pulse 76   Resp 18   Ht 6' 2\" (1.88 m)   Wt 306 lb (138.8 kg)   SpO2 98%   BMI 39.29 kg/m²

## 2019-08-19 NOTE — PROGRESS NOTES
HISTORY OF PRESENT ILLNESS  Clarence Ward is a 64 y.o. male.h/o ATRIAL FIBRILLATION,  This was discovered by his pcp when patient was seen for wrist injurie. Nuclear stress test on 2/12 no ischemia or mi and EF of 51%  Echo on 1/12 normal EF no gross RWMA  24 hours holter on 2/12 afib   Dc cardioversion on 5/12 attempted several times with also the addition of corvert with no restoration of NSR  He has undergone PPM and atrial fibrillation ablation with Dr. Sidra Sanchez in 8/12      Admitted to [facility] on 11/16 for cp and AF with RVR  Converted with ibutilide and then started on sotalol, also [address] started      Nuclear stress test on 11/16:1. No definite evidence of ischemia and/or infarction. 2. LVEF equals 52%. Left ventricular wall motion and thickening is normal.  On 1/17 : ABLATION OF AF     Recurrent PAF on 2018 off on despite increase in medications  Eventually he underwent on 7/18 with Dr. Farah:AV frank ablation and His bundle recording with fluoroscopy.  pacemaker was re-interrogated and reprogrammed before and after the ablation.     PMH: as above and some djd    PSH: none    FH: not significant for early CAD or SCD   SH: no etoh or tobacco, works as a   HPI  One episode of \"electrical shock\" in 7/19  Otherwise ok he tells me  Review of Systems   Respiratory: Negative. Cardiovascular: Negative. Visit Vitals  /80 (BP 1 Location: Left arm, BP Patient Position: Sitting)   Pulse 76   Resp 18   Ht 6' 2\" (1.88 m)   Wt 306 lb (138.8 kg)   SpO2 98%   BMI 39.29 kg/m²       Physical Exam   Neck: No JVD present. Carotid bruit is not present. Cardiovascular: Normal rate. An irregularly irregular rhythm present. Pulmonary/Chest: Effort normal and breath sounds normal.   Abdominal: Soft. Musculoskeletal: He exhibits no edema. Psychiatric: He has a normal mood and affect.      Current Outpatient Medications on File Prior to Visit   Medication Sig Dispense Refill    metoprolol tartrate (LOPRESSOR) 25 mg tablet TAKE 1 TABLET BY MOUTH TWICE A  Tab 1    dabigatran etexilate (PRADAXA) 150 mg capsule TAKE ONE CAPSULE BY MOUTH EVERY 12 HOURS 180 Cap 1    acetaminophen (TYLENOL EXTRA STRENGTH) 500 mg tablet Take 1,000 mg by mouth every six (6) hours as needed for Pain. No current facility-administered medications on file prior to visit. ASSESSMENT and PLAN  AF: s/p ablation in 2012 and 7/17. Recurrent paf on 7/17 and 2018 eventually he underwent successful av frank ablation and sotalol was stopped. He feels much better now and he is completely asymptomatic     His ECG shows AF and V PACED   His [id number] score is 1 (*HTN) . Continue Pradaxa. No untoward effects thus far. PPM interrogation today for \"electrical shock\"     His cholesterol is closely followed by his PCP     HTN: reasonable on current dose of metoprolol no change at this time     continue routine PPM surveillance. Obtain echo (paced rhythm for a long time)     Discussed with him need for exercise and weight loss and decided to loose 15 lbs before next OV     CP: not recurrent , possibly was related to AF with RVR.  Normal nuclear stress test on 11/16     See him back otherwise in 12 months if echo is ok

## 2019-12-26 ENCOUNTER — CLINICAL SUPPORT (OUTPATIENT)
Dept: CARDIOLOGY CLINIC | Age: 59
End: 2019-12-26

## 2019-12-26 ENCOUNTER — OFFICE VISIT (OUTPATIENT)
Dept: CARDIOLOGY CLINIC | Age: 59
End: 2019-12-26

## 2019-12-26 VITALS
RESPIRATION RATE: 14 BRPM | BODY MASS INDEX: 39.78 KG/M2 | DIASTOLIC BLOOD PRESSURE: 76 MMHG | SYSTOLIC BLOOD PRESSURE: 112 MMHG | HEART RATE: 74 BPM | WEIGHT: 310 LBS | HEIGHT: 74 IN

## 2019-12-26 DIAGNOSIS — I48.0 PAF (PAROXYSMAL ATRIAL FIBRILLATION) (HCC): ICD-10-CM

## 2019-12-26 DIAGNOSIS — Z86.79 S/P ABLATION OF ATRIAL FIBRILLATION: ICD-10-CM

## 2019-12-26 DIAGNOSIS — Z79.01 CHRONIC ANTICOAGULATION: ICD-10-CM

## 2019-12-26 DIAGNOSIS — Z86.39 HISTORY OF MORBID OBESITY: ICD-10-CM

## 2019-12-26 DIAGNOSIS — I44.2 COMPLETE AV BLOCK DUE TO AV NODAL ABLATION (HCC): ICD-10-CM

## 2019-12-26 DIAGNOSIS — I97.190 COMPLETE AV BLOCK DUE TO AV NODAL ABLATION (HCC): ICD-10-CM

## 2019-12-26 DIAGNOSIS — Z95.0 CARDIAC PACEMAKER IN SITU: Primary | ICD-10-CM

## 2019-12-26 DIAGNOSIS — Z98.890 S/P ABLATION OF ATRIAL FIBRILLATION: ICD-10-CM

## 2019-12-26 DIAGNOSIS — I49.5 SICK SINUS SYNDROME (HCC): ICD-10-CM

## 2019-12-26 NOTE — PROGRESS NOTES
Cardiac Electrophysiology Office Note     Subjective:      Cruz Shi is a 64 y.o. male patient who is seen for follow up of atrial fibrillation and pacemaker. He is in NSR today and there is av dissociation since his AFIB resolved  He has had this come and go afterwards    He had had AV node ablation. The patient had recurrent atrial fibrillation with rapid ventricular rate despite A fib ablation 1/10/17 (he was admitted with recurrent AF in 2016 and was cardioverted) the patient had the initial atrial fibrillation ablation in 2012. He feels much better without the sotalol  No bleeding pradaxa.   + occasional chest pain 3-4 min and dizziness  09/19/19   ECHO ADULT COMPLETE 09/20/2019 9/20/2019    Narrative · Left Ventricle: Normal cavity size and systolic function (ejection   fraction normal). Mild concentric hypertrophy. Estimated left ventricular   ejection fraction is 56 - 60%. Biplane method used to measure ejection   fraction. No regional wall motion abnormality noted. · Left Atrium: Moderately dilated left atrium. · Right Ventricle: Not well visualized. Mildly dilated right ventricle. · Right Atrium: Mildly dilated right atrium. · Aortic Valve: Mild aortic valve sclerosis with no significant stenosis. Aortic valve leaflet calcification present. · Mitral Valve: Mitral valve thickening. Mild mitral valve regurgitation   is present. · Tricuspid Valve: Mild tricuspid valve regurgitation is present. · Possible left pleural effusion noted.         Signed by: Beckie Choi MD         He is a patient of Dr Leonor Stein and I had done pacemaker for sick sinus syndrome before and I had done AF ablation for him 4 years ago   Admitted 11/21/16 for chest pain and stress test was normal   Pmhx includes atrial fibrillation with RVR, hx of atrial fibrillation ablation 7/25/2012 PAT, NSVT (5/4/16 on device check), SSS s/p pacemaker (Medtronic dual chamber pacemaker implant 8/17/2012), hx of unsuccessful [facility] 5/2012. He had echocardiogram in 2016 normal left ventricular function. There were dilatation of the atria. Nuclear stress test in 2016 show no ischemia    Previous cardiac testing  Echo 2012 LVEF 55 % to 60 %. No RWMA. Ventricular septum: There was a possible very small congenital defect in the perimembranous location. No significant valve abnormalities. Nuclear stress test 2/2012 no ischemia, LVEF 51%    Problem List  Date Reviewed: 12/26/2019          Codes Class Noted    Severe obesity (BMI 35.0-39. 9) ICD-10-CM: E66.01  ICD-9-CM: 278.01  7/31/2018        Complete AV block due to AV frank ablation [facility]) ICD-10-CM: I97.190, I44.2  ICD-9-CM: 997.1, 426.0  7/16/2018    Overview Signed 7/16/2018  9:20 AM by Pooja Hawkins MD     7/16/2018             S/P ablation of atrial fibrillation ICD-10-CM: Z98.890, Z86.79  ICD-9-CM: V45.89  1/10/2017    Overview Addendum 1/11/2017  8:07 AM by JAKI Nascimento Dr 1/10/16  Ablations of paroxysmal atrial fibrillation by pulmonary vein isolations             SVT (supraventricular tachycardia) ([facility] ICD-10-CM: I47.1  ICD-9-CM: 427.89  11/22/2016        Pacemaker ICD-10-CM: Z95.0  ICD-9-CM: V45.01  8/31/2012        Sick sinus syndrome ([facility] [address] [address] ICD-10-CM: I49.5  ICD-9-CM: 427.81  8/17/2012    Overview Signed 8/17/2012  4:12 PM by Pooja Hawkins MD     S/p Medtronic dual chamber pacemaker implant 8/17/2012             Cough ICD-10-CM: R05  ICD-9-CM: 786.2  8/6/2012        Atelectasis ICD-10-CM: J98.11  ICD-9-CM: 518.0  8/6/2012        Other dyspnea and respiratory abnormality ICD-10-CM: R06.09, R09.89  ICD-9-CM: 786.09  8/6/2012        Sinus bradycardia on ECG ICD-10-CM: R00.1  ICD-9-CM: 427.89  8/6/2012        Dizziness ICD-10-CM: R42  ICD-9-CM: 780.4  6/4/2012        Persistent atrial fibrillation with rapid ventricular response ICD-10-CM: I48.19  ICD-9-CM: 427.31  4/30/2012    Overview Addendum 1/10/2017 10:03 PM by Pooja Hawkins MD     1/10/2017                   Current Outpatient Medications   Medication Sig Dispense Refill    metoprolol tartrate (LOPRESSOR) 25 mg tablet TAKE 1 TABLET BY MOUTH TWICE A  Tab 1    dabigatran etexilate (PRADAXA) 150 mg capsule TAKE ONE CAPSULE BY MOUTH EVERY 12 HOURS 180 Cap 1    acetaminophen (TYLENOL EXTRA STRENGTH) 500 mg tablet Take 1,500 mg by mouth every six (6) hours as needed for Pain (Daily). Allergies   Allergen Reactions    Silvadene [Silver Sulfadiazine] Swelling     Past Medical History:   Diagnosis Date    Atrial fibrillation [facility])     Pacemaker      Past Surgical History:   Procedure Laterality Date    ABLATION, ATRIAL FIBRILLATION   7/25/2012         ECHO INTRACARDIAC  7/25/2012         EP STUDY W/O INDUCTION  7/25/2012         HX PACEMAKER      HX WISDOM TEETH EXTRACTION      40 yrs ago    INS PPM/ICD LED DUAL ONLY  8/17/2012         AK ICAR CATHETER ABLATION ATRIOVENTR NODE FUNCTION  7/16/2018         AK INTRACARDIAC ELECTROPHYSIOLOGIC 3D MAPPING  7/25/2012         AK LEFT HEART CATH BY TRANSEPTAL PUNCTURE  7/25/2012         CHRISTEL ECHO COLOR FLOW  7/25/2012          Family History   Problem Relation Age of Onset    Hypertension Mother      Social History     Tobacco Use    Smoking status: Never Smoker    Smokeless tobacco: Never Used   Substance Use Topics    Alcohol use: No        Review of Systems:   Constitutional: Negative for fever, chills, weight loss  HEENT: Negative for nosebleeds, vision changes. Respiratory: Negative for cough, hemoptysis, sputum production, and wheezing. Cardiovascular: Negative for orthopnea, claudication, leg swelling, syncope, and PND. Gastrointestinal: Negative for nausea, vomiting, diarrhea, constipation, blood in stool and melena. Genitourinary: Negative for dysuria, and hematuria. Musculoskeletal: Negative for myalgias, arthralgia. Skin: Negative for rash. Heme: Does not bleed or bruise easily.    Neurological: Negative for speech change and focal weakness     Objective:     Visit Vitals  /76 (BP 1 Location: Left arm, BP Patient Position: Sitting)   Pulse 74   Resp 14   Ht 6' 2\" (1.88 m)   Wt 310 lb (140.6 kg)   BMI 39.80 kg/m²      Physical Exam:   Constitutional: well-developed and well-nourished. No distress. Head: Normocephalic and atraumatic. Eyes: Pupils are equal, round  Neck: supple. No JVD present. Cardiovascular: Normal rate, regular rhythm. Exam reveals no gallop and no friction rub. No murmur heard. Pulmonary/Chest: Effort normal and breath sounds normal. No wheezes. left side   Abdominal: Soft, obese  Musculoskeletal: no edema. Brace left ankle   Neurological: alert,oriented. Skin: Skin is warm and dry. Left-sided pacemaker incision is healed unremarkable  Psychiatric: normal mood and affect. Behavior is normal. Judgment and thought content normal.       Assessment/Plan:       ICD-10-CM ICD-9-CM    1. Cardiac pacemaker in situ Z95.0 V45.01    2. PAF (paroxysmal atrial fibrillation) ([facility]) I48.0 427.31    3. S/P ablation of atrial fibrillation Z98.890 V45.89     Z86.79     4. Chronic anticoagulation Z79.01 V58.61    5. Complete AV block due to AV frank ablation ([facility]) I97.190 997.1     I44.2 426.0    6. Sick sinus syndrome ([facility]) I49.5 427.81    7. History of morbid obesity Z87.898 V13.89       The pacemaker has been checked and show proper function.   Battery is 3.5 years  He is DDDR programmed today as sinus rhythm has returned  His RV lead has 1024 ohms pacing impedance  He is 99% RV pacing  No recent AFIB or VT when checked today  I showed him leadless pacer if he happens to be chronic AF in the future and RV lead needs removal  For now LVEF is normal  Continue with current meds    Future Appointments   Date Time Provider Jam Swathi   4/8/2020  3:45 PM [address], [address]   7/13/2020  3:15 PM [address], [address]   8/17/2020 10:20 AM Nolan Damon  [address]   10/19/2020 1:45 PM REMOTE1, [address]   1/28/2021  1:30 PM [email or web address], [address]   1/28/2021  2:00 PM Antony Saba  [address]         Thank you for involving me in this patient's care and please call with further concerns or questions. Yair Dixon M.D.   Electrophysiology/Cardiology  [facility] [place]  [facility] [address], [facility] [address], [address]  [phone]

## 2019-12-27 RX ORDER — DABIGATRAN ETEXILATE 150 MG/1
CAPSULE ORAL
Qty: 60 CAP | Refills: 5 | Status: SHIPPED | OUTPATIENT
Start: 2019-12-27 | End: 2020-06-03

## 2020-01-09 NOTE — PROGRESS NOTES
ECG atrial tachycardia with ventricular pacing and sensing Stelara Counseling:  I discussed with the patient the risks of ustekinumab including but not limited to immunosuppression, malignancy, posterior leukoencephalopathy syndrome, and serious infections.  The patient understands that monitoring is required including a PPD at baseline and must alert us or the primary physician if symptoms of infection or other concerning signs are noted.

## 2020-01-27 RX ORDER — METOPROLOL TARTRATE 25 MG/1
TABLET, FILM COATED ORAL
Qty: 180 TAB | Refills: 1 | Status: SHIPPED | OUTPATIENT
Start: 2020-01-27 | End: 2020-07-16

## 2020-01-27 NOTE — TELEPHONE ENCOUNTER
Cardiologist: Dr. Michaela iSm    Last appt: 12/26/2019  Future Appointments   Date Time Provider Jam Echevarria   4/8/2020  3:45 PM REMOTE1, [address]   7/13/2020  3:15 PM REMOTE1, [address]   8/17/2020 10:20 AM Pedro Solo  [address]   10/19/2020  1:45 PM REMOTE1, [address]   1/28/2021  1:30 PM [email or web address], [address]   1/28/2021  2:00 PM Mike Mitchell  [address]       Requested Prescriptions     Signed Prescriptions Disp Refills    metoprolol tartrate (LOPRESSOR) 25 mg tablet 180 Tab 1     Sig: TAKE 1 TABLET BY MOUTH TWICE A DAY     Authorizing Provider: CAM GILL     Ordering User: KLAUS GARCIA         Refills VO per Dr. Michaela Sim.

## 2020-04-08 ENCOUNTER — OFFICE VISIT (OUTPATIENT)
Dept: CARDIOLOGY CLINIC | Age: 60
End: 2020-04-08

## 2020-04-08 DIAGNOSIS — Z95.0 CARDIAC PACEMAKER IN SITU: Primary | ICD-10-CM

## 2020-06-03 RX ORDER — DABIGATRAN ETEXILATE 150 MG/1
CAPSULE ORAL
Qty: 180 CAP | Refills: 2 | Status: SHIPPED | OUTPATIENT
Start: 2020-06-03 | End: 2021-02-23

## 2020-06-03 NOTE — TELEPHONE ENCOUNTER
Requested Prescriptions     Signed Prescriptions Disp Refills    dabigatran etexilate (Pradaxa) 150 mg capsule 180 Cap 2     Sig: TAKE 1 CAPSULE BY MOUTH EVERY 12 HOURS     Authorizing Provider: Casey Kahn     Ordering User: Luster Aschoff     Per verbal orders

## 2020-07-13 ENCOUNTER — OFFICE VISIT (OUTPATIENT)
Dept: CARDIOLOGY CLINIC | Age: 60
End: 2020-07-13

## 2020-07-13 DIAGNOSIS — Z95.0 CARDIAC PACEMAKER IN SITU: Primary | ICD-10-CM

## 2020-07-16 RX ORDER — METOPROLOL TARTRATE 25 MG/1
TABLET, FILM COATED ORAL
Qty: 180 TAB | Refills: 1 | Status: SHIPPED | OUTPATIENT
Start: 2020-07-16 | End: 2021-01-06

## 2020-07-16 NOTE — TELEPHONE ENCOUNTER
Refill requested for metoprolol 25 mg po tabs. Refill authorized. Follow up as listed below.     Future Appointments   Date Time Provider Jam Echevarria   8/17/2020 10:20 AM Addy Poole  [address]   10/19/2020  1:45 PM REMOTE1, [address] [facility]   1/28/2021  1:30 PM [id number], [address] [facility]   1/28/2021  2:00 PM Shukri Clark  [address]

## 2020-08-17 ENCOUNTER — OFFICE VISIT (OUTPATIENT)
Dept: CARDIOLOGY CLINIC | Age: 60
End: 2020-08-17
Payer: COMMERCIAL

## 2020-08-17 VITALS
HEIGHT: 74 IN | RESPIRATION RATE: 18 BRPM | DIASTOLIC BLOOD PRESSURE: 70 MMHG | WEIGHT: 297 LBS | HEART RATE: 76 BPM | OXYGEN SATURATION: 97 % | SYSTOLIC BLOOD PRESSURE: 120 MMHG | BODY MASS INDEX: 38.12 KG/M2

## 2020-08-17 DIAGNOSIS — I48.0 PAF (PAROXYSMAL ATRIAL FIBRILLATION) (HCC): Primary | ICD-10-CM

## 2020-08-17 PROCEDURE — 99214 OFFICE O/P EST MOD 30 MIN: CPT | Performed by: SPECIALIST

## 2020-08-17 PROCEDURE — 93000 ELECTROCARDIOGRAM COMPLETE: CPT | Performed by: SPECIALIST

## 2020-08-17 NOTE — PROGRESS NOTES
[address] [facility]                                                            OFFICE NOTE        Rogers Cabello M.D.,ELISEO.[facility]   1960  [id number]    Date/Time:  0/97/146084:61 AM        ICD-10-CM ICD-9-CM    1. PAF (paroxysmal atrial fibrillation) ([facility])  I48.0 427.31 AMB POC EKG ROUTINE W/ 12 LEADS, INTER & REP         SUBJECTIVE:  Occasional dizziness reported but not bad he tells me and mmostly when he stands up too quickly  No cp or sob or palpitations       Assessment/Plan  AF: s/p ablation in 2012 and 7/17. Recurrent paf on 7/17 and 2018 eventually he underwent successful av frank ablation and sotalol was stopped. He feels much better now and he is completely asymptomatic     His ECG shows probable AF and V PACED   His [id number] score is 1 (*HTN) .    Continue Pradaxa. No untoward effects thus far. PPM interrogation continue routine checks     His cholesterol is closely followed by his PCP     HTN: reasonable on current dose of metoprolol no change at this time     continue routine PPM surveillance. Discussed echo results of 2019 with preserved EF     Discussed with him need for exercise and weight loss and decided to loose 15 lbs before next OV     CP: not recurrent , possibly was related to AF with RVR. Normal nuclear stress test on 11/16     See him back otherwise in 12 months            HPI   64 y.o. male.h/o ATRIAL FIBRILLATION,  This was discovered by his pcp when patient was seen for wrist injurie.   Nuclear stress test on 2/12 no ischemia or mi and EF of 51%  Echo on 1/12 normal EF no gross RWMA  24 hours holter on 2/12 afib   Dc cardioversion on 5/12 attempted several times with also the addition of corvert with no restoration of NSR  He has undergone PPM and atrial fibrillation ablation with Dr. Kassie Phalen in 8/12      Admitted to [facility] on 11/16 for cp and AF with RVR  Converted with ibutilide and then started on sotalol, also [address] started      Nuclear stress test on 11/16:1. No definite evidence of ischemia and/or infarction. 2. LVEF equals 52%. Left ventricular wall motion and thickening is normal.  On 1/17 : ABLATION OF AF     Recurrent PAF on 2018 off on despite increase in medications  Eventually he underwent on 7/18 with Dr. Farah:AV frank ablation and His bundle recording with fluoroscopy.  pacemaker was re-interrogated and reprogrammed before and after the ablation.     PMH: as above and some djd    PSH: none    FH: not significant for early CAD or SCD   SH: no etoh or tobacco, works as a               CARDIAC STUDIES        09/19/19   ECHO ADULT COMPLETE 09/20/2019 9/20/2019    Narrative · Left Ventricle: Normal cavity size and systolic function (ejection   fraction normal). Mild concentric hypertrophy. Estimated left ventricular   ejection fraction is 56 - 60%. Biplane method used to measure ejection   fraction. No regional wall motion abnormality noted. · Left Atrium: Moderately dilated left atrium. · Right Ventricle: Not well visualized. Mildly dilated right ventricle. · Right Atrium: Mildly dilated right atrium. · Aortic Valve: Mild aortic valve sclerosis with no significant stenosis. Aortic valve leaflet calcification present. · Mitral Valve: Mitral valve thickening. Mild mitral valve regurgitation   is present. · Tricuspid Valve: Mild tricuspid valve regurgitation is present. · Possible left pleural effusion noted. Signed by: Hodges Dakins, MD             11/21/16   NM CARDIAC SPECT W STRS/REST MULT 11/23/2016 11/23/2016    Narrative Indication: Chest pain    A Lexiscan myocardial SPECT gated wall motion study was performed utilizing 32  mCi of Tc MYOVIEW for rest and 33 mCi for stress. SPECT imaging at stress and rest demonstrates no definite evidence of ischemia  and/or infarction.   There is slight thinning of the inferior wall most likely attenuation artifact. Left ventricular ejection fraction equals 52%. Left ventricular wall motion and  thickening are within normal limits. Impression IMPRESSION:  1. No definite evidence of ischemia and/or infarction. 2. LVEF equals 52%. Left ventricular wall motion and thickening is normal.       Signed by: Di Mcdaniel MD                      EKG Results     Procedure 720 Value Units Date/Time    AMB POC EKG ROUTINE W/ 12 LEADS, INTER & REP [[id number]] Resulted:  08/17/20 1029    Order Status:  Completed Updated:  08/17/20 1033              IMAGING      MRI Results (most recent):  No results found for this or any previous visit. CT Results (most recent):  Results from Hospital Encounter encounter on 12/13/16   CTA CHEST W WO CONT    Narrative CT chest - pulmonary vein protocol     CPT code: 29072    INDICATION: Atrial fibrillation. Pulmonary vein mapping study. COMPARISON: 7/9/2012    TECHNIQUE: Thin collimation noncontrast and contrast enhanced axial images  obtained through the level of the pulmonary vasculature. Raw data initially  reviewed. There was uneventful administration of 75 mL's of Isovue-370 as  intravenous contrast material. Following this, image reconstruction performed on  a iDiDi[employer].2 Advantage workstation with 3-D, maximum intensity projection and  volumetric rendering of the pulmonary veins reviewed. CT dose reduction was  achieved through use of a standardized protocol tailored for this examination  and automatic exposure control for dose modulation. COMPARISON: 7912    FINDINGS:  The images of the chest began at about the level of aortic arch and extending to  the lung bases. The entirety of the lungs are not evaluated. The visualized  lungs demonstrate no suspicious nodules. The mediastinal windows demonstrate no  mediastinal or hilar adenopathy. There is a pacemaker in place with one lead in  the right atrium the other in the right ventricle.  The right ventricle right  atrium are mildly enlarged as is the main pulmonary artery segment ingesting  pulmonary hypertension. The left atrial appendage is well opacified no evidence for thrombus. .     Right superior pulmonary vein: Venous trunk measures approximately 3.3 x 2.8 cm  diameter, extending 7 mm until  first branching. Approximately 7 mm between the origins of the right superior and inferior  pulmonary veins. Right inferior pulmonary vein: Venous trunk measures approximately 2.4 x 2.0 cm  diameter, extending 4.0 mm until first branching. Left superior pulmonary vein: Venous trunk measures approximately 1.8 x 1.3 cm  diameter, extending 6.5 mm until first branching. Approximately 6 mm between the origins of the left superior and inferior  pulmonary veins. Left inferior pulmonary vein: Venous trunk measures approximately 2.0 x 1.5 cm,  extending 14.2 mm until first branching. Impression IMPRESSION:  1. Pulmonary venous anatomy as outlined above. No anomalies are detected. 2.  There is enlargement of the main pulmonary artery segment, right ventricle  and right atrium suggesting elevated right heart pressures and pulmonary  hypertension. .         XR Results (most recent):  Results from Hospital Encounter encounter on 12/12/18   XR CHEST PA LAT    Narrative EXAM:  XR CHEST PA LAT    INDICATION:  Cough, history of pneumonia. COMPARISON: November 22, 2016    TECHNIQUE: PA and lateral chest views    FINDINGS: Left chest wall dual chamber pacemaker in unchanged position. Heart  size is normal. No pericardial or pleural effusion. The lungs are clear. Osseous  structures are age-appropriate. Impression IMPRESSION:    No evidence of pneumonia.              Past Medical History:   Diagnosis Date    Atrial fibrillation [facility])     Pacemaker      Past Surgical History:   Procedure Laterality Date    ABLATION, ATRIAL FIBRILLATION   7/25/2012         ECHO INTRACARDIAC  7/25/2012         EP STUDY W/O INDUCTION  7/25/2012         HX PACEMAKER      HX WISDOM TEETH EXTRACTION      40 yrs ago    INS PPM/ICD LED DUAL ONLY  8/17/2012         VT ICAR CATHETER ABLATION ATRIOVENTR NODE FUNCTION  7/16/2018         VT INTRACARDIAC ELECTROPHYSIOLOGIC 3D MAPPING  7/25/2012         VT LEFT HEART CATH BY TRANSEPTAL PUNCTURE  7/25/2012         CHRISTEL ECHO COLOR FLOW  7/25/2012          Social History     Tobacco Use    Smoking status: Never Smoker    Smokeless tobacco: Never Used   Substance Use Topics    Alcohol use: No    Drug use: No     Family History   Problem Relation Age of Onset    Hypertension Mother      Allergies   Allergen Reactions    Silvadene [Silver Sulfadiazine] Swelling         Visit Vitals  /70 (BP 1 Location: Left arm, BP Patient Position: Sitting)   Pulse 76   Resp 18   Ht 6' 2\" (1.88 m)   Wt 297 lb (134.7 kg)   SpO2 97%   BMI 38.13 kg/m²         Last 3 Recorded Weights in this Encounter    08/17/20 1025   Weight: 297 lb (134.7 kg)            Review of Systems:   Pertinent items are noted in the History of Present Illness. Visit Vitals  /70 (BP 1 Location: Left arm, BP Patient Position: Sitting)   Pulse 76   Resp 18   Ht 6' 2\" (1.88 m)   Wt 297 lb (134.7 kg)   SpO2 97%   BMI 38.13 kg/m²     General Appearance:  Well developed, well nourished,alert and oriented x 3, and individual in no acute distress. Ears/Nose/Mouth/Throat:   Hearing grossly normal.         Neck: Supple. Chest:   Lungs clear to auscultation bilaterally. Cardiovascular:  Regular rate and rhythm, S1, S2 normal, no murmur. Abdomen:   Soft, non-tender, bowel sounds are active. Extremities: No edema bilaterally. Skin: Warm and dry.                  Current Outpatient Medications on File Prior to Visit   Medication Sig Dispense Refill    metoprolol tartrate (LOPRESSOR) 25 mg tablet TAKE 1 TABLET BY MOUTH TWICE A  Tab 1    dabigatran etexilate (Pradaxa) 150 mg capsule TAKE 1 CAPSULE BY MOUTH EVERY 12 HOURS 180 Cap 2    acetaminophen (TYLENOL EXTRA STRENGTH) 500 mg tablet Take 1,500 mg by mouth every six (6) hours as needed for Pain (Daily). No current facility-administered medications on file prior to visit. Dejuan Ruiz had no medications administered during this visit. Current Outpatient Medications   Medication Sig    metoprolol tartrate (LOPRESSOR) 25 mg tablet TAKE 1 TABLET BY MOUTH TWICE A DAY    dabigatran etexilate (Pradaxa) 150 mg capsule TAKE 1 CAPSULE BY MOUTH EVERY 12 HOURS    acetaminophen (TYLENOL EXTRA STRENGTH) 500 mg tablet Take 1,500 mg by mouth every six (6) hours as needed for Pain (Daily). No current facility-administered medications for this visit. Lab Results   Component Value Date/Time    Cholesterol, total 100 11/22/2016 04:33 AM    HDL Cholesterol 41 11/22/2016 04:33 AM    LDL, calculated 47.2 11/22/2016 04:33 AM    VLDL, calculated 11.8 11/22/2016 04:33 AM    Triglyceride 59 11/22/2016 04:33 AM    CHOL/HDL Ratio 2.4 11/22/2016 04:33 AM       Lab Results   Component Value Date/Time    Sodium 142 07/16/2018 07:53 AM    Potassium 4.3 07/16/2018 07:53 AM    Chloride 109 (H) 07/16/2018 07:53 AM    CO2 25 07/16/2018 07:53 AM    Anion gap 8 07/16/2018 07:53 AM    Glucose 99 07/16/2018 07:53 AM    BUN 10 07/16/2018 07:53 AM    Creatinine 0.87 07/16/2018 07:53 AM    BUN/Creatinine ratio 11 (L) 07/16/2018 07:53 AM    GFR est AA >60 07/16/2018 07:53 AM    GFR est non-AA >60 07/16/2018 07:53 AM    Calcium 8.1 (L) 07/16/2018 07:53 AM       Lab Results   Component Value Date/Time    ALT (SGPT) 37 11/21/2016 10:44 PM    Alk.  phosphatase 98 11/21/2016 10:44 PM    Bilirubin, total 0.3 11/21/2016 10:44 PM       Lab Results   Component Value Date/Time    WBC 6.1 07/16/2018 07:53 AM    HGB 14.5 07/16/2018 07:53 AM    HCT 43.3 07/16/2018 07:53 AM    PLATELET 877 95/28/7908 07:53 AM    MCV 96.9 07/16/2018 07:53 AM       Lab Results   Component Value Date/Time    TSH 1.48 11/22/2016 04:33 AM         Lab Results   Component Value Date/Time    Cholesterol, total 100 11/22/2016 04:33 AM    HDL Cholesterol 41 11/22/2016 04:33 AM    LDL, calculated 47.2 11/22/2016 04:33 AM    Triglyceride 59 11/22/2016 04:33 AM    CHOL/HDL Ratio 2.4 11/22/2016 04:33 AM                Please note that this dictation was completed with [employer], the computer voice recognition software. Quite often unanticipated grammatical, syntax, homophones, and other interpretative errors are inadvertently transcribed by the computer software. Please disregard these errors. Please excuse any errors that have escaped final proofreading.

## 2020-08-17 NOTE — PROGRESS NOTES
Visit Vitals  /70 (BP 1 Location: Left arm, BP Patient Position: Sitting)   Pulse 76   Resp 18   Ht 6' 2\" (1.88 m)   Wt 297 lb (134.7 kg)   SpO2 97%   BMI 38.13 kg/m²

## 2020-10-19 ENCOUNTER — OFFICE VISIT (OUTPATIENT)
Dept: CARDIOLOGY CLINIC | Age: 60
End: 2020-10-19
Payer: COMMERCIAL

## 2020-10-19 DIAGNOSIS — Z95.0 CARDIAC PACEMAKER IN SITU: Primary | ICD-10-CM

## 2020-10-19 PROCEDURE — 93296 REM INTERROG EVL PM/IDS: CPT | Performed by: INTERNAL MEDICINE

## 2020-10-19 PROCEDURE — 93294 REM INTERROG EVL PM/LDLS PM: CPT | Performed by: INTERNAL MEDICINE

## 2021-01-06 RX ORDER — METOPROLOL TARTRATE 25 MG/1
TABLET, FILM COATED ORAL
Qty: 180 TAB | Refills: 1 | Status: SHIPPED | OUTPATIENT
Start: 2021-01-06 | End: 2021-07-01 | Stop reason: SDUPTHER

## 2021-01-06 NOTE — TELEPHONE ENCOUNTER
Received refill request for metoprolol 25 mg tabs. Refill authorized.     Future Appointments   Date Time Provider [facility] Swathi   1/28/2021  1:30 PM Minoo Garcia BS AMB   1/28/2021  2:00 PM MD MARITZA Fernando BS AMB   8/16/2021 10:20 AM MD MARITZA Odom BS AMB

## 2021-01-28 ENCOUNTER — CLINICAL SUPPORT (OUTPATIENT)
Dept: CARDIOLOGY CLINIC | Age: 61
End: 2021-01-28
Payer: COMMERCIAL

## 2021-01-28 ENCOUNTER — OFFICE VISIT (OUTPATIENT)
Dept: CARDIOLOGY CLINIC | Age: 61
End: 2021-01-28

## 2021-01-28 VITALS
HEIGHT: 74 IN | SYSTOLIC BLOOD PRESSURE: 106 MMHG | DIASTOLIC BLOOD PRESSURE: 74 MMHG | RESPIRATION RATE: 16 BRPM | BODY MASS INDEX: 38.63 KG/M2 | WEIGHT: 301 LBS | HEART RATE: 70 BPM

## 2021-01-28 DIAGNOSIS — I49.5 SICK SINUS SYNDROME (HCC): ICD-10-CM

## 2021-01-28 DIAGNOSIS — Z79.01 CHRONIC ANTICOAGULATION: ICD-10-CM

## 2021-01-28 DIAGNOSIS — E66.01 SEVERE OBESITY WITH BODY MASS INDEX (BMI) OF 35.0 TO 39.9 WITH SERIOUS COMORBIDITY (HCC): ICD-10-CM

## 2021-01-28 DIAGNOSIS — Z95.0 CARDIAC PACEMAKER IN SITU: Primary | ICD-10-CM

## 2021-01-28 DIAGNOSIS — I44.2 COMPLETE AV BLOCK DUE TO AV NODAL ABLATION (HCC): ICD-10-CM

## 2021-01-28 DIAGNOSIS — I97.190 COMPLETE AV BLOCK DUE TO AV NODAL ABLATION (HCC): ICD-10-CM

## 2021-01-28 DIAGNOSIS — I48.0 PAF (PAROXYSMAL ATRIAL FIBRILLATION) (HCC): ICD-10-CM

## 2021-01-28 DIAGNOSIS — Z86.79 S/P ABLATION OF ATRIAL FIBRILLATION: ICD-10-CM

## 2021-01-28 DIAGNOSIS — Z98.890 S/P ABLATION OF ATRIAL FIBRILLATION: ICD-10-CM

## 2021-01-28 PROCEDURE — 99214 OFFICE O/P EST MOD 30 MIN: CPT | Performed by: INTERNAL MEDICINE

## 2021-01-28 PROCEDURE — 93280 PM DEVICE PROGR EVAL DUAL: CPT | Performed by: INTERNAL MEDICINE

## 2021-01-28 RX ORDER — METHOCARBAMOL 500 MG/1
TABLET, FILM COATED ORAL
COMMUNITY
Start: 2020-10-27 | End: 2022-02-17 | Stop reason: ALTCHOICE

## 2021-01-28 NOTE — PROGRESS NOTES
Cardiac Electrophysiology Office Note     Subjective:      Shalonda Darby is a 64 y.o. male patient who presents for follow up, is s/p Medtronic dual chamber pacemaker (DOI 08/17/2012) & AV node ablation (07/16/2018). Some lightheadedness after bending over. Some occasional mild SOB. Occasional palpitations. Also notes occasional \"shocking\" sensation at device site. He denies chest pain, PND, orthopnea, or syncope. BP controlled. Anticoagulated with Pradaxa, denies bleeding issues. Previous:  S/p AV node ablation 07/16/2018. Recurrent AF with RVR despite AF ablation 01/10/2017. Admitted with recurrent AF in 2016, was cardioverted. S/p Medtronic dual chamber pacemaker (DOI 08/17/2012).     Dr. Nawaf Tyler is primary cardiologist.      Problem List  Date Reviewed: 1/28/2021          Codes Class Noted    Severe obesity with body mass index (BMI) of 35.0 to 39.9 with serious comorbidity ([facility] ICD-10-CM: E66.01  ICD-9-CM: 278.01  7/31/2018        Complete AV block due to AV frank ablation [facility]) ICD-10-CM: I97.190, I44.2  ICD-9-CM: 997.1, 426.0  7/16/2018    Overview Signed 7/16/2018  9:20 AM by Annmarie Leiva MD     7/16/2018             S/P ablation of atrial fibrillation ICD-10-CM: Z98.890, Z86.79  ICD-9-CM: V45.89  1/10/2017    Overview Addendum 1/11/2017  8:07 AM by JAKI Luu Dr 1/10/16  Ablations of paroxysmal atrial fibrillation by pulmonary vein isolations             SVT (supraventricular tachycardia) ([facility] ICD-10-CM: I47.1  ICD-9-CM: 427.89  11/22/2016        Pacemaker ICD-10-CM: Z95.0  ICD-9-CM: V45.01  8/31/2012        Sick sinus syndrome ([facility] ICD-10-CM: I49.5  ICD-9-CM: 427.81  8/17/2012    Overview Signed 8/17/2012  4:12 PM by Annmarie Leiva MD     S/p Medtronic dual chamber pacemaker implant 8/17/2012             Cough ICD-10-CM: V02  ICD-9-CM: 786.2  8/6/2012        Atelectasis ICD-10-CM: J98.11  ICD-9-CM: 518.0  8/6/2012        Other dyspnea and respiratory abnormality ICD-10-CM: R06.09, R09.89  ICD-9-CM: 786.09  8/6/2012        Sinus bradycardia on ECG ICD-10-CM: R00.1  ICD-9-CM: 427.89  8/6/2012        Dizziness ICD-10-CM: R42  ICD-9-CM: 780.4  6/4/2012        Persistent atrial fibrillation with rapid ventricular response ([facility] ICD-10-CM: I48.19  ICD-9-CM: 427.31  4/30/2012    Overview Addendum 1/10/2017 10:03 PM by Adina March MD     1/10/2017                   Current Outpatient Medications   Medication Sig Dispense Refill    metoprolol tartrate (LOPRESSOR) 25 mg tablet TAKE 1 TABLET BY MOUTH TWICE A  Tab 1    dabigatran etexilate (Pradaxa) 150 mg capsule TAKE 1 CAPSULE BY MOUTH EVERY 12 HOURS 180 Cap 2    acetaminophen (TYLENOL EXTRA STRENGTH) 500 mg tablet Take 1,500 mg by mouth every six (6) hours as needed for Pain (Daily).  methocarbamoL (ROBAXIN) 500 mg tablet TAKE 1 TABLET BY MOUTH THREE TIMES A DAY FOR 7 DAYS       Allergies   Allergen Reactions    Silvadene [Silver Sulfadiazine] Swelling     Past Medical History:   Diagnosis Date    Atrial fibrillation [facility])     Pacemaker      Past Surgical History:   Procedure Laterality Date    ABLATION, ATRIAL FIBRILLATION   7/25/2012         ECHO INTRACARDIAC  7/25/2012         EP STUDY W/O INDUCTION  7/25/2012         HX PACEMAKER      HX WISDOM TEETH EXTRACTION      40 yrs ago    INS PPM/ICD LED DUAL ONLY  8/17/2012         NV ICAR CATHETER ABLATION ATRIOVENTR NODE FUNCTION  7/16/2018         NV INTRACARDIAC ELECTROPHYSIOLOGIC 3D MAPPING  7/25/2012         NV LEFT HEART CATH BY TRANSEPTAL PUNCTURE  7/25/2012         CHRISTEL ECHO COLOR FLOW  7/25/2012          Family History   Problem Relation Age of Onset    Hypertension Mother      Social History     Tobacco Use    Smoking status: Never Smoker    Smokeless tobacco: Never Used   Substance Use Topics    Alcohol use: No        Review of Systems: All other systems otherwise negative.   Constitutional: Negative for fever, chills, weight loss  HEENT: Negative for nosebleeds, vision changes. Respiratory: Negative for cough, hemoptysis, sputum production, and wheezing. Cardiovascular: Negative for orthopnea, claudication, + leg swelling, no syncope, and PND. + palpitations, SOB, lightheadedness with bending over. Gastrointestinal: Negative for nausea, vomiting, diarrhea, constipation, blood in stool and melena. Genitourinary: Negative for dysuria, and hematuria. Musculoskeletal: Negative for myalgias, arthralgia. Skin: Negative for rash. Heme: Does not bleed or bruise easily. Neurological: Negative for speech change and focal weakness. Objective:     Visit Vitals  /74 (BP 1 Location: Left upper arm, BP Patient Position: Sitting)   Pulse 70   Resp 16   Ht 6' 2\" (1.88 m)   Wt 301 lb (136.5 kg)   BMI 38.65 kg/m²      Physical Exam:   Constitutional: Well-developed and well-nourished. No distress. Head: Normocephalic and atraumatic. Eyes: Pupils are equal, round. Neck: Supple. No JVD present. Cardiovascular: Normal rate, regular rhythm. Exam reveals no gallop and no friction rub. No murmur heard. Pulmonary/Chest: Effort normal and breath sounds normal. No wheezes. left side   Abdominal: Soft, obese. Musculoskeletal: Moves extremities independently. Normal gait. Vasc/lymphatic: No edema. Neurological: Alert,oriented. Skin: Skin is warm and dry. Left-sided pacemaker incision is well healed, unremarkable. Psychiatric: normal mood and affect. Behavior is normal. Judgment and thought content normal.        Imaging/Studies:  Echo (09/19/2019): LVEF 56-60%, mild concentric LVH, no RWMA. Mildly dilated RV. Mod dilated LA. Mild MR. Mild TR. Possible left pleural effusion. Nuclear stress (11/22/2016): LVEF 52%, no definite evidence of ischemia and/or infarction. Assessment/Plan:       ICD-10-CM ICD-9-CM    1. Cardiac pacemaker in situ  Z95.0 V45.01    2.  PAF (paroxysmal atrial fibrillation) ([facility])  I48.0 427.31    3. S/P ablation of atrial fibrillation  Z98.890 V45.89     Z86.79     4. Complete AV block due to AV frank ablation (HCC)  I97.190 997.1     I44.2 426.0    5. Sick sinus syndrome (HCC)  I49.5 427.81    6. Chronic anticoagulation  Z79.01 V58.61    7. Severe obesity with body mass index (BMI) of 35.0 to 39.9 with serious comorbidity (HCC)  E66.01 278.01         Medtronic dual chamber pacemaker (DOI 08/17/2012): Device check today shows proper lead & generator function. Generator longevity estimated 1 yr, 10 mos. RA 97%, %. Since 12/26/2019, 5608 AHR & 3 HVR episodes. Presenting EGMs for AHR events show AF, longest 72 hrs, 2 min on 01/20/2021. AMS 19.1%. EGMs for HVR events suggest NSVT (3-6 seconds, V rate 219 bpm). He may feel NSVT but AFIB controlled with pacemaker  Last EF was normal    Persistent AF: Permanently rate control s/p AV node ablation. NSVT: As above. Continue metoprolol as previously prescribed. Anticoagulation:  Continue Pradaxa as previously prescribed for embolic CVA prophylaxis. No bleeding issues. May have orthostatic dizziness as bp is low at baseline  toprol low dose for NSVT    Future Appointments   Date Time Provider Jam Echevarria   5/19/2021  3:30 PM Cherrie SALAS BS AMB   8/16/2021 10:20 AM MD MARITZA Velasco BS AMB   8/23/2021  1:45 PM REMOTE1, SARMAD SALAS BS AMB   11/29/2021  1:30 PM REMOTE1, SARMAD SALAS BS AMB   2/17/2022  2:20 PM PACEMAKER3, SARMAD SALAS BS AMB   2/17/2022  2:40 PM MD MARITZA Crockett BS AMB     Thank you for involving me in this patient's care and please call with further concerns or questions. Phyllis Chen M.D.   Electrophysiology/Cardiology  [facility] [employer]  [address], [place] [address], [address]  [address], [address] [place] [address]  [phone]

## 2021-02-23 RX ORDER — DABIGATRAN ETEXILATE 150 MG/1
CAPSULE ORAL
Qty: 180 CAP | Refills: 2 | Status: SHIPPED | OUTPATIENT
Start: 2021-02-23 | End: 2021-11-22

## 2021-05-19 ENCOUNTER — OFFICE VISIT (OUTPATIENT)
Dept: CARDIOLOGY CLINIC | Age: 61
End: 2021-05-19
Payer: COMMERCIAL

## 2021-05-19 DIAGNOSIS — Z95.0 CARDIAC PACEMAKER IN SITU: Primary | ICD-10-CM

## 2021-05-19 PROCEDURE — 93294 REM INTERROG EVL PM/LDLS PM: CPT | Performed by: INTERNAL MEDICINE

## 2021-05-19 PROCEDURE — 93296 REM INTERROG EVL PM/IDS: CPT | Performed by: INTERNAL MEDICINE

## 2021-07-01 RX ORDER — METOPROLOL TARTRATE 25 MG/1
TABLET, FILM COATED ORAL
Qty: 180 TABLET | Refills: 1 | Status: SHIPPED | OUTPATIENT
Start: 2021-07-01 | End: 2021-12-21

## 2021-07-01 NOTE — PROGRESS NOTES
Requested Prescriptions     Signed Prescriptions Disp Refills    metoprolol tartrate (LOPRESSOR) 25 mg tablet 180 Tablet 1     Sig: TAKE 1 TABLET BY MOUTH TWICE A DAY       Last office visit 1/28/2021    Per Dr. Desire Barba   Date Time Provider Jam Swathi   8/16/2021 10:20 AM MD MARITZA Dodge BS AMB   8/23/2021  1:45 PM REMOTE1, SARMAD NORMAN AMB   11/29/2021  1:30 PM REMOTE1, SARMAD NORMAN AMB   2/17/2022  2:20 PM PACEMAKER3, SARMAD NORMAN AMB   2/17/2022  2:40 PM MD MARITZA Nj BS AMB

## 2021-08-23 ENCOUNTER — OFFICE VISIT (OUTPATIENT)
Dept: CARDIOLOGY CLINIC | Age: 61
End: 2021-08-23
Payer: COMMERCIAL

## 2021-08-23 DIAGNOSIS — Z95.0 CARDIAC PACEMAKER IN SITU: Primary | ICD-10-CM

## 2021-08-23 PROCEDURE — 93296 REM INTERROG EVL PM/IDS: CPT | Performed by: INTERNAL MEDICINE

## 2021-08-23 NOTE — LETTER
8/24/2021 8:30 AM    Mr. Kali Fisher  [address] [address]          This letter confirms that we have received your scheduled remote check of your implanted     device on 8- . Our EP team will contact you via phone if there are significant abnormal    findings. Your next remote check from home is scheduled for 11- . If you have any questions, please call [address] [place] [address] Drive at [phone].                Sincerely,    Willian Burt MD [facility]

## 2021-09-03 ENCOUNTER — OFFICE VISIT (OUTPATIENT)
Dept: CARDIOLOGY CLINIC | Age: 61
End: 2021-09-03
Payer: COMMERCIAL

## 2021-09-03 VITALS
HEIGHT: 74 IN | BODY MASS INDEX: 39.27 KG/M2 | WEIGHT: 306 LBS | DIASTOLIC BLOOD PRESSURE: 62 MMHG | RESPIRATION RATE: 18 BRPM | OXYGEN SATURATION: 97 % | SYSTOLIC BLOOD PRESSURE: 102 MMHG | HEART RATE: 83 BPM

## 2021-09-03 DIAGNOSIS — I47.1 SVT (SUPRAVENTRICULAR TACHYCARDIA) (HCC): Primary | ICD-10-CM

## 2021-09-03 PROCEDURE — 93000 ELECTROCARDIOGRAM COMPLETE: CPT | Performed by: SPECIALIST

## 2021-09-03 PROCEDURE — 99214 OFFICE O/P EST MOD 30 MIN: CPT | Performed by: SPECIALIST

## 2021-09-03 NOTE — PROGRESS NOTES
.  Visit Vitals  /62 (BP 1 Location: Right arm, BP Patient Position: Sitting, BP Cuff Size: Adult)   Pulse 83   Resp 18   Ht 6' 2\" (1.88 m)   Wt 306 lb (138.8 kg)   SpO2 97%   BMI 39.29 kg/m²

## 2021-09-03 NOTE — PROGRESS NOTES
[address] [facility]                                                            OFFICE NOTE        Evonne Libman M.D.,CLAIRECortney [facility]   1960  [id number]    Date/Time:  9/3/682258:13 AM            SUBJECTIVE:  He feels okay overall. He continues to work. He walks between 15,000 and 20,000 steps a day tells me. He only has chest pain when he turns to his left or right sitting down but he does not have pain with walking. No constant shortness of breath by time he feels a little bit dyspneic he tells me. No palpitation presyncopal syncopal episode dizziness are reported. Assessment/Plan    1. Atrial fibrillation: Status post atrial fibrillation ablation 2012 in 2017. He has had recurrence of atrial fibrillation and eventually he underwent successful AV node ablation in 2018. Status post permanent pacemaker placement at that time as well. His electrocardiogram today reveals a likely underlying atrial fibrillation ventricular paced rhythm. He remains on Pradaxa with no untoward effects thus far. Interrogation of pacemaker noted with paroxysmal atrial fibrillation. Shortness of breath reported somewhat inconsistent but given also the presence of pacemaker I will proceed with echocardiogram at the next office visit. 3. Hypertension: None and actually blood pressure the lower limits of normal he denies any particular dizziness at this time. 4. Chest pain: This is very atypical in my opinion and most likely muscle skeletal. We will let me know if it changes in nature and frequency otherwise. 5. Permanent pacemaker: Closely followed by Dr. Emory Merlin. Otherwise see him back in 6 months 1 week after the echocardiogram.          HPI   64 y. o. male.h/o ATRIAL FIBRILLATION,  This was discovered by his pcp when patient was seen for wrist injurie.   Nuclear stress test on 2/12 no ischemia or mi and EF of 51%  Echo on 1/12 normal EF no gross RWMA  24 hours holter on 2/12 afib   Dc cardioversion on 5/12 attempted several times with also the addition of corvert with no restoration of NSR  He has undergone PPM and atrial fibrillation ablation with Dr. Sveta Anderson in 8/12      Admitted to [facility] on 11/16 for cp and AF with RVR  Converted with ibutilide and then started on sotalol, also [address] started      Nuclear stress test on 11/16:1. No definite evidence of ischemia and/or infarction. 2. LVEF equals 52%. Left ventricular wall motion and thickening is normal.  On 1/17 : ABLATION OF AF     Recurrent PAF on 2018 off on despite increase in medications  Eventually he underwent on 7/18 with Dr. Farah:AV frank ablation and His bundle recording with fluoroscopy.  pacemaker was re-interrogated and reprogrammed before and after the ablation.     PMH: as above and some djd    PSH: none    FH: not significant for early CAD or SCD   SH: no etoh or tobacco, works as a               CARDIAC STUDIES        09/19/19    ECHO ADULT COMPLETE 09/20/2019 9/20/2019    Interpretation Summary  · Left Ventricle: Normal cavity size and systolic function (ejection fraction normal). Mild concentric hypertrophy. Estimated left ventricular ejection fraction is 56 - 60%. Biplane method used to measure ejection fraction. No regional wall motion abnormality noted. · Left Atrium: Moderately dilated left atrium. · Right Ventricle: Not well visualized. Mildly dilated right ventricle. · Right Atrium: Mildly dilated right atrium. · Aortic Valve: Mild aortic valve sclerosis with no significant stenosis. Aortic valve leaflet calcification present. · Mitral Valve: Mitral valve thickening. Mild mitral valve regurgitation is present. · Tricuspid Valve: Mild tricuspid valve regurgitation is present. · Possible left pleural effusion noted.     Signed by: Julio César Franklin MD on 9/20/2019  1:44 PM            11/21/16    NM CARDIAC SPECT Lgen Ped STRS/REST MULT 11/23/2016 11/23/2016    Narrative  Indication: Chest pain    A Lexiscan myocardial SPECT gated wall motion study was performed utilizing 32  mCi of Tc MYOVIEW for rest and 33 mCi for stress. SPECT imaging at stress and rest demonstrates no definite evidence of ischemia  and/or infarction. There is slight thinning of the inferior wall most likely attenuation artifact. Left ventricular ejection fraction equals 52%. Left ventricular wall motion and  thickening are within normal limits. Impression  IMPRESSION:  1. No definite evidence of ischemia and/or infarction. 2. LVEF equals 52%. Left ventricular wall motion and thickening is normal.    Signed by: Jazmin Hayden MD on 11/23/2016 10:00 AM                    EKG Results     Procedure 720 Value Units Date/Time    AMB POC EKG ROUTINE W/ 12 LEADS, INTER & REP [[id number]] Resulted: 09/03/21 4110    Order Status: Completed Updated: 09/03/21 0957              IMAGING      MRI Results (most recent):  No results found for this or any previous visit. CT Results (most recent):  Results from Hospital Encounter encounter on 12/13/16    CTA CHEST W WO CONT    Narrative  CT chest - pulmonary vein protocol    CPT code: 49379    INDICATION: Atrial fibrillation. Pulmonary vein mapping study. COMPARISON: 7/9/2012    TECHNIQUE: Thin collimation noncontrast and contrast enhanced axial images  obtained through the level of the pulmonary vasculature. Raw data initially  reviewed. There was uneventful administration of 75 mL's of Isovue-370 as  intravenous contrast material. Following this, image reconstruction performed on  a [employer] Advantage workstation with 3-D, maximum intensity projection and  volumetric rendering of the pulmonary veins reviewed. CT dose reduction was  achieved through use of a standardized protocol tailored for this examination  and automatic exposure control for dose modulation.     COMPARISON: 7912    FINDINGS:  The images of the chest began at about the level of aortic arch and extending to  the lung bases. The entirety of the lungs are not evaluated. The visualized  lungs demonstrate no suspicious nodules. The mediastinal windows demonstrate no  mediastinal or hilar adenopathy. There is a pacemaker in place with one lead in  the right atrium the other in the right ventricle. The right ventricle right  atrium are mildly enlarged as is the main pulmonary artery segment ingesting  pulmonary hypertension. The left atrial appendage is well opacified no evidence for thrombus. .    Right superior pulmonary vein: Venous trunk measures approximately 3.3 x 2.8 cm  diameter, extending 7 mm until  first branching. Approximately 7 mm between the origins of the right superior and inferior  pulmonary veins. Right inferior pulmonary vein: Venous trunk measures approximately 2.4 x 2.0 cm  diameter, extending 4.0 mm until first branching. Left superior pulmonary vein: Venous trunk measures approximately 1.8 x 1.3 cm  diameter, extending 6.5 mm until first branching. Approximately 6 mm between the origins of the left superior and inferior  pulmonary veins. Left inferior pulmonary vein: Venous trunk measures approximately 2.0 x 1.5 cm,  extending 14.2 mm until first branching. Impression  IMPRESSION:  1. Pulmonary venous anatomy as outlined above. No anomalies are detected. 2.  There is enlargement of the main pulmonary artery segment, right ventricle  and right atrium suggesting elevated right heart pressures and pulmonary  hypertension. .      XR Results (most recent):  Results from Hospital Encounter encounter on 12/12/18    XR CHEST PA LAT    Narrative  EXAM:  XR CHEST PA LAT    INDICATION:  Cough, history of pneumonia. COMPARISON: November 22, 2016    TECHNIQUE: PA and lateral chest views    FINDINGS: Left chest wall dual chamber pacemaker in unchanged position. Heart  size is normal. No pericardial or pleural effusion.  The lungs are clear. Osseous  structures are age-appropriate. Impression  IMPRESSION:    No evidence of pneumonia. Past Medical History:   Diagnosis Date    Atrial fibrillation [facility])     Pacemaker      Past Surgical History:   Procedure Laterality Date    ABLATION, ATRIAL FIBRILLATION   7/25/2012         ECHO INTRACARDIAC  7/25/2012         EP STUDY W/O INDUCTION  7/25/2012         HX PACEMAKER      HX WISDOM TEETH EXTRACTION      40 yrs ago    INS PPM/ICD LED DUAL ONLY  8/17/2012         AL ICAR CATHETER ABLATION ATRIOVENTR NODE FUNCTION  7/16/2018         AL INTRACARDIAC ELECTROPHYSIOLOGIC 3D MAPPING  7/25/2012         AL LEFT HEART CATH BY TRANSEPTAL PUNCTURE  7/25/2012         CHRISTEL ECHO COLOR FLOW  7/25/2012          Social History     Tobacco Use    Smoking status: Never Smoker    Smokeless tobacco: Never Used   Substance Use Topics    Alcohol use: No    Drug use: No     Family History   Problem Relation Age of Onset    Hypertension Mother      Allergies   Allergen Reactions    Silvadene [Silver Sulfadiazine] Swelling         Visit Vitals  /62 (BP 1 Location: Right arm, BP Patient Position: Sitting, BP Cuff Size: Adult)   Pulse 83   Resp 18   Ht 6' 2\" (1.88 m)   Wt 306 lb (138.8 kg)   SpO2 97%   BMI 39.29 kg/m²         Last 3 Recorded Weights in this Encounter    09/03/21 0945   Weight: 306 lb (138.8 kg)            Review of Systems:   Pertinent items are noted in the History of Present Illness. Visit Vitals  /62 (BP 1 Location: Right arm, BP Patient Position: Sitting, BP Cuff Size: Adult)   Pulse 83   Resp 18   Ht 6' 2\" (1.88 m)   Wt 306 lb (138.8 kg)   SpO2 97%   BMI 39.29 kg/m²     General Appearance:  Well developed, well nourished,alert and oriented x 3, and individual in no acute distress. Ears/Nose/Mouth/Throat:   Hearing grossly normal.         Neck: Supple. Chest:   Lungs clear to auscultation bilaterally.    Cardiovascular:  Regular rate and rhythm, S1, S2 normal, no murmur. Abdomen:   Soft, non-tender, bowel sounds are active. Extremities: No edema bilaterally. Skin: Warm and dry. Current Outpatient Medications on File Prior to Visit   Medication Sig Dispense Refill    metoprolol tartrate (LOPRESSOR) 25 mg tablet TAKE 1 TABLET BY MOUTH TWICE A  Tablet 1    dabigatran etexilate (Pradaxa) 150 mg capsule TAKE 1 CAPSULE BY MOUTH EVERY 12 HOURS 180 Cap 2    acetaminophen (TYLENOL EXTRA STRENGTH) 500 mg tablet Take 1,500 mg by mouth every six (6) hours as needed for Pain (Daily).  methocarbamoL (ROBAXIN) 500 mg tablet TAKE 1 TABLET BY MOUTH THREE TIMES A DAY FOR 7 DAYS (Patient not taking: Reported on 9/3/2021)       No current facility-administered medications on file prior to visit. Patty García had no medications administered during this visit. Current Outpatient Medications   Medication Sig    metoprolol tartrate (LOPRESSOR) 25 mg tablet TAKE 1 TABLET BY MOUTH TWICE A DAY    dabigatran etexilate (Pradaxa) 150 mg capsule TAKE 1 CAPSULE BY MOUTH EVERY 12 HOURS    acetaminophen (TYLENOL EXTRA STRENGTH) 500 mg tablet Take 1,500 mg by mouth every six (6) hours as needed for Pain (Daily).  methocarbamoL (ROBAXIN) 500 mg tablet TAKE 1 TABLET BY MOUTH THREE TIMES A DAY FOR 7 DAYS (Patient not taking: Reported on 9/3/2021)     No current facility-administered medications for this visit.          Lab Results   Component Value Date/Time    Cholesterol, total 100 11/22/2016 04:33 AM    HDL Cholesterol 41 11/22/2016 04:33 AM    LDL, calculated 47.2 11/22/2016 04:33 AM    VLDL, calculated 11.8 11/22/2016 04:33 AM    Triglyceride 59 11/22/2016 04:33 AM    CHOL/HDL Ratio 2.4 11/22/2016 04:33 AM       Lab Results   Component Value Date/Time    Sodium 142 07/16/2018 07:53 AM    Potassium 4.3 07/16/2018 07:53 AM    Chloride 109 (H) 07/16/2018 07:53 AM    CO2 25 07/16/2018 07:53 AM    Anion gap 8 07/16/2018 07:53 AM    Glucose 99 07/16/2018 07:53 AM    BUN 10 07/16/2018 07:53 AM    Creatinine 0.87 07/16/2018 07:53 AM    BUN/Creatinine ratio 11 (L) 07/16/2018 07:53 AM    GFR est AA >60 07/16/2018 07:53 AM    GFR est non-AA >60 07/16/2018 07:53 AM    Calcium 8.1 (L) 07/16/2018 07:53 AM       Lab Results   Component Value Date/Time    ALT (SGPT) 37 11/21/2016 10:44 PM    Alk. phosphatase 98 11/21/2016 10:44 PM    Bilirubin, total 0.3 11/21/2016 10:44 PM       Lab Results   Component Value Date/Time    WBC 6.1 07/16/2018 07:53 AM    HGB 14.5 07/16/2018 07:53 AM    HCT 43.3 07/16/2018 07:53 AM    PLATELET 149 57/43/5610 07:53 AM    MCV 96.9 07/16/2018 07:53 AM       Lab Results   Component Value Date/Time    TSH 1.48 11/22/2016 04:33 AM         Lab Results   Component Value Date/Time    Cholesterol, total 100 11/22/2016 04:33 AM    HDL Cholesterol 41 11/22/2016 04:33 AM    LDL, calculated 47.2 11/22/2016 04:33 AM    Triglyceride 59 11/22/2016 04:33 AM    CHOL/HDL Ratio 2.4 11/22/2016 04:33 AM                Please note that this dictation was completed with Yovigo, the computer voice recognition software. Quite often unanticipated grammatical, syntax, homophones, and other interpretative errors are inadvertently transcribed by the computer software. Please disregard these errors. Please excuse any errors that have escaped final proofreading.

## 2021-10-28 ENCOUNTER — OFFICE VISIT (OUTPATIENT)
Dept: INTERNAL MEDICINE CLINIC | Age: 61
End: 2021-10-28
Payer: COMMERCIAL

## 2021-10-28 VITALS
HEIGHT: 74 IN | RESPIRATION RATE: 18 BRPM | DIASTOLIC BLOOD PRESSURE: 60 MMHG | SYSTOLIC BLOOD PRESSURE: 108 MMHG | BODY MASS INDEX: 38.63 KG/M2 | WEIGHT: 301 LBS | HEART RATE: 73 BPM | OXYGEN SATURATION: 96 % | TEMPERATURE: 98.1 F

## 2021-10-28 DIAGNOSIS — Z12.5 PROSTATE CANCER SCREENING: ICD-10-CM

## 2021-10-28 DIAGNOSIS — Z95.0 PACEMAKER: ICD-10-CM

## 2021-10-28 DIAGNOSIS — Z11.59 NEED FOR HEPATITIS C SCREENING TEST: ICD-10-CM

## 2021-10-28 DIAGNOSIS — I48.19 PERSISTENT ATRIAL FIBRILLATION WITH RAPID VENTRICULAR RESPONSE (HCC): ICD-10-CM

## 2021-10-28 DIAGNOSIS — M79.89 SWELLING OF LOWER LEG: ICD-10-CM

## 2021-10-28 DIAGNOSIS — Z12.11 COLON CANCER SCREENING: ICD-10-CM

## 2021-10-28 DIAGNOSIS — Z00.00 ENCOUNTER FOR MEDICAL EXAMINATION TO ESTABLISH CARE: Primary | ICD-10-CM

## 2021-10-28 DIAGNOSIS — E66.01 SEVERE OBESITY WITH BODY MASS INDEX (BMI) OF 35.0 TO 39.9 WITH SERIOUS COMORBIDITY (HCC): ICD-10-CM

## 2021-10-28 PROCEDURE — 99203 OFFICE O/P NEW LOW 30 MIN: CPT | Performed by: INTERNAL MEDICINE

## 2021-10-28 NOTE — PROGRESS NOTES
Health Maintenance Due   Topic Date Due    Hepatitis C Screening  Never done    DTaP/Tdap/Td series (1 - Tdap) Never done    Colorectal Cancer Screening Combo  Never done    Shingrix Vaccine Age 50> (2 of 2) 09/06/2019    Flu Vaccine (1) 09/01/2021       Chief Complaint   Patient presents with    Annual Wellness Visit    Establish Care       1. Have you been to the ER, urgent care clinic since your last visit? Hospitalized since your last visit? No    2. Have you seen or consulted any other health care providers outside of the [address] [place] [address] since your last visit? Include any pap smears or colon screening. No    3) Do you have an Advance Directive on file? no    4) Are you interested in receiving information on Advance Directives? NO      Patient is accompanied by self I have received verbal consent from Harmony Pinon to discuss any/all medical information while they are present in the room.

## 2021-10-28 NOTE — PROGRESS NOTES
HISTORY OF PRESENT ILLNESS  Yolanda Morgan is a 63 y.o. male. Patient was seen to establish care. PMH was reviewed. Patient is followed by cardiology for his Afib and pacemaker. Has a history of SVT, AV block and an ablation. Patient reports that he continues to take all his medications as directed. Reports some chest discomfort and will have an ECHO completed on 11/2. Reports some ongoing lower leg swelling. Wears compression and it has helped some. Also reports some dizziness when he goes to stand. This is always after moving from activity to the next. Sits down and it often goes away. In need of a colonoscopy. Received the COVID, flu and Shingles vaccine. Lives at home with his wife. No recent falls.    Visit Vitals  /60 (BP 1 Location: Right arm, BP Patient Position: Sitting, BP Cuff Size: Adult)   Pulse 73   Temp 98.1 °F (36.7 °C) (Oral)   Resp 18   Ht 6' 2\" (1.88 m)   Wt 301 lb (136.5 kg)   SpO2 96%   BMI 38.65 kg/m²     Past Medical History:   Diagnosis Date    Atrial fibrillation [facility])     Pacemaker      Past Surgical History:   Procedure Laterality Date    ABLATION, ATRIAL FIBRILLATION   7/25/2012         ECHO INTRACARDIAC  7/25/2012         EP STUDY W/O INDUCTION  7/25/2012         HX PACEMAKER      HX WISDOM TEETH EXTRACTION      40 yrs ago    INS PPM/ICD LED DUAL ONLY  8/17/2012         AK ICAR CATHETER ABLATION ATRIOVENTR NODE FUNCTION  7/16/2018         AK INTRACARDIAC ELECTROPHYSIOLOGIC 3D MAPPING  7/25/2012         AK LEFT HEART CATH BY TRANSEPTAL PUNCTURE  7/25/2012         CHRISTEL ECHO COLOR FLOW  7/25/2012          Family History   Problem Relation Age of Onset    Hypertension Mother      Outpatient Encounter Medications as of 10/28/2021   Medication Sig Dispense Refill    metoprolol tartrate (LOPRESSOR) 25 mg tablet TAKE 1 TABLET BY MOUTH TWICE A  Tablet 1    dabigatran etexilate (Pradaxa) 150 mg capsule TAKE 1 CAPSULE BY MOUTH EVERY 12 HOURS 180 Cap 2    methocarbamoL (ROBAXIN) 500 mg tablet TAKE 1 TABLET BY MOUTH THREE TIMES A DAY FOR 7 DAYS      acetaminophen (TYLENOL EXTRA STRENGTH) 500 mg tablet Take 1,500 mg by mouth every six (6) hours as needed for Pain (Daily). No facility-administered encounter medications on file as of 10/28/2021. HPI    Review of Systems   Constitutional: Negative. Respiratory: Negative. Cardiovascular: Negative. Gastrointestinal: Negative. Genitourinary: Negative. Musculoskeletal: Positive for joint pain. Negative for falls. Neurological: Positive for dizziness. Psychiatric/Behavioral: Negative. Physical Exam  Vitals and nursing note reviewed. HENT:      Head: Normocephalic. Cardiovascular:      Rate and Rhythm: Normal rate and regular rhythm. Pulmonary:      Effort: Pulmonary effort is normal.      Breath sounds: Normal breath sounds. Abdominal:      Palpations: Abdomen is soft. Musculoskeletal:      Right lower leg: Edema present. Left lower leg: Edema present. Neurological:      Mental Status: He is alert and oriented to person, place, and time. Psychiatric:         Behavior: Behavior normal.         ASSESSMENT and PLAN  Diagnoses and all orders for this visit:    1. Encounter for medical examination to establish care  -     CBC WITH AUTOMATED DIFF; Future  -     METABOLIC PANEL, COMPREHENSIVE; Future  -     TSH 3RD GENERATION; Future    2. Persistent atrial fibrillation with rapid ventricular response (HCC)  -     LIPID PANEL; Future    3. Pacemaker    4. Severe obesity with body mass index (BMI) of 35.0 to 39.9 with serious comorbidity ([address]    5. Swelling of lower leg    6. Need for hepatitis C screening test  -     HEPATITIS C AB; Future    7. Prostate cancer screening  -     PSA, DIAGNOSTIC (PROSTATE SPECIFIC AG); Future    8.  Colon cancer screening  -     REFERRAL TO COLON AND RECTAL SURGERY      Follow-up and Dispositions    · Return in about 6 months (around 4/28/2022), or if symptoms worsen or fail to improve.       lab results and schedule of future lab studies reviewed with patient  reviewed diet, exercise and weight control  cardiovascular risk and specific lipid/LDL goals reviewed  reviewed medications and side effects in detail

## 2021-10-29 LAB
ALBUMIN SERPL-MCNC: 4.5 G/DL (ref 3.8–4.9)
ALBUMIN/GLOB SERPL: 2 {RATIO} (ref 1.2–2.2)
ALP SERPL-CCNC: 85 IU/L (ref 44–121)
ALT SERPL-CCNC: 23 IU/L (ref 0–44)
AST SERPL-CCNC: 22 IU/L (ref 0–40)
BASOPHILS # BLD AUTO: 0 X10E3/UL (ref 0–0.2)
BASOPHILS NFR BLD AUTO: 1 %
BILIRUB SERPL-MCNC: 0.4 MG/DL (ref 0–1.2)
BUN SERPL-MCNC: 12 MG/DL (ref 6–24)
BUN/CREAT SERPL: 13 (ref 9–20)
CALCIUM SERPL-MCNC: 9.4 MG/DL (ref 8.7–10.2)
CHLORIDE SERPL-SCNC: 102 MMOL/L (ref 96–106)
CHOLEST SERPL-MCNC: 141 MG/DL (ref 100–199)
CO2 SERPL-SCNC: 26 MMOL/L (ref 20–29)
CREAT SERPL-MCNC: 0.9 MG/DL (ref 0.76–1.27)
EOSINOPHIL # BLD AUTO: 0.1 X10E3/UL (ref 0–0.4)
EOSINOPHIL NFR BLD AUTO: 1 %
ERYTHROCYTE [DISTWIDTH] IN BLOOD BY AUTOMATED COUNT: 12.5 % (ref 11.6–15.4)
GLOBULIN SER CALC-MCNC: 2.3 G/DL (ref 1.5–4.5)
GLUCOSE SERPL-MCNC: 101 MG/DL (ref 65–99)
HCT VFR BLD AUTO: 46.5 % (ref 37.5–51)
HCV AB S/CO SERPL IA: <0.1 S/CO RATIO (ref 0–0.9)
HDLC SERPL-MCNC: 45 MG/DL
HGB BLD-MCNC: 15.6 G/DL (ref 13–17.7)
IMM GRANULOCYTES # BLD AUTO: 0 X10E3/UL (ref 0–0.1)
IMM GRANULOCYTES NFR BLD AUTO: 1 %
IMP & REVIEW OF LAB RESULTS: NORMAL
LDLC SERPL CALC-MCNC: 76 MG/DL (ref 0–99)
LYMPHOCYTES # BLD AUTO: 1.9 X10E3/UL (ref 0.7–3.1)
LYMPHOCYTES NFR BLD AUTO: 29 %
MCH RBC QN AUTO: 32.5 PG (ref 26.6–33)
MCHC RBC AUTO-ENTMCNC: 33.5 G/DL (ref 31.5–35.7)
MCV RBC AUTO: 97 FL (ref 79–97)
MONOCYTES # BLD AUTO: 0.6 X10E3/UL (ref 0.1–0.9)
MONOCYTES NFR BLD AUTO: 9 %
NEUTROPHILS # BLD AUTO: 3.9 X10E3/UL (ref 1.4–7)
NEUTROPHILS NFR BLD AUTO: 59 %
PLATELET # BLD AUTO: 243 X10E3/UL (ref 150–450)
POTASSIUM SERPL-SCNC: 5.2 MMOL/L (ref 3.5–5.2)
PROT SERPL-MCNC: 6.8 G/DL (ref 6–8.5)
PSA SERPL-MCNC: 1.9 NG/ML (ref 0–4)
RBC # BLD AUTO: 4.8 X10E6/UL (ref 4.14–5.8)
SODIUM SERPL-SCNC: 143 MMOL/L (ref 134–144)
TRIGL SERPL-MCNC: 111 MG/DL (ref 0–149)
TSH SERPL DL<=0.005 MIU/L-ACNC: 1.81 UIU/ML (ref 0.45–4.5)
VLDLC SERPL CALC-MCNC: 20 MG/DL (ref 5–40)
WBC # BLD AUTO: 6.5 X10E3/UL (ref 3.4–10.8)

## 2021-11-22 RX ORDER — DABIGATRAN ETEXILATE 150 MG/1
CAPSULE ORAL
Qty: 180 CAPSULE | Refills: 1 | Status: SHIPPED | OUTPATIENT
Start: 2021-11-22 | End: 2022-05-19

## 2021-11-29 ENCOUNTER — OFFICE VISIT (OUTPATIENT)
Dept: CARDIOLOGY CLINIC | Age: 61
End: 2021-11-29
Payer: COMMERCIAL

## 2021-11-29 DIAGNOSIS — Z95.0 CARDIAC PACEMAKER IN SITU: Primary | ICD-10-CM

## 2021-11-29 PROCEDURE — 93294 REM INTERROG EVL PM/LDLS PM: CPT | Performed by: INTERNAL MEDICINE

## 2021-11-29 PROCEDURE — 93296 REM INTERROG EVL PM/IDS: CPT | Performed by: INTERNAL MEDICINE

## 2021-11-29 NOTE — LETTER
11/29/2021 3:33 PM    Mr. Arlyn Parra  [address] [facility] [id number]            This letter confirms that we have received your scheduled remote check of your implanted     device on 11-29-21 . Our EP team will contact you via phone if there are significant abnormal    findings. Your next in-clinic device check is scheduled for 2-17-22 at 2:20 PM.                 If you have any questions, please call [address] [place] [address] at [phone].                Sincerely,          Deb Bustos MD [facility]

## 2021-12-21 RX ORDER — METOPROLOL TARTRATE 25 MG/1
TABLET, FILM COATED ORAL
Qty: 180 TABLET | Refills: 1 | Status: SHIPPED | OUTPATIENT
Start: 2021-12-21 | End: 2022-06-22

## 2021-12-21 NOTE — TELEPHONE ENCOUNTER
Received refill request for metoprolol 25 mg po tabs. Refill authorized.     Future Appointments   Date Time Provider Jam Echevarria   2/17/2022  2:20 PM Janeen Black BS AMB   2/17/2022  2:40 PM MD MARITZA Montes De Oca BS AMB   3/3/2022 10:00 AM SARMAD HUTCHINSON BS AMB   3/10/2022 10:20 AM MD MARITZA Mckeon BS AMB   4/28/2022 10:30 AM Lucien Quintero, JAKI VALLADARES BS AMB

## 2022-02-17 ENCOUNTER — OFFICE VISIT (OUTPATIENT)
Dept: CARDIOLOGY CLINIC | Age: 62
End: 2022-02-17

## 2022-02-17 ENCOUNTER — CLINICAL SUPPORT (OUTPATIENT)
Dept: CARDIOLOGY CLINIC | Age: 62
End: 2022-02-17
Payer: COMMERCIAL

## 2022-02-17 VITALS
WEIGHT: 315 LBS | BODY MASS INDEX: 40.43 KG/M2 | HEIGHT: 74 IN | HEART RATE: 74 BPM | OXYGEN SATURATION: 97 % | DIASTOLIC BLOOD PRESSURE: 78 MMHG | SYSTOLIC BLOOD PRESSURE: 120 MMHG | RESPIRATION RATE: 20 BRPM

## 2022-02-17 DIAGNOSIS — Z95.0 CARDIAC PACEMAKER IN SITU: Primary | ICD-10-CM

## 2022-02-17 DIAGNOSIS — I97.190 COMPLETE AV BLOCK DUE TO AV NODAL ABLATION (HCC): ICD-10-CM

## 2022-02-17 DIAGNOSIS — Z98.890 S/P ABLATION OF ATRIAL FIBRILLATION: ICD-10-CM

## 2022-02-17 DIAGNOSIS — I44.2 COMPLETE AV BLOCK DUE TO AV NODAL ABLATION (HCC): ICD-10-CM

## 2022-02-17 DIAGNOSIS — I48.0 PAF (PAROXYSMAL ATRIAL FIBRILLATION) (HCC): ICD-10-CM

## 2022-02-17 DIAGNOSIS — Z86.79 S/P ABLATION OF ATRIAL FIBRILLATION: ICD-10-CM

## 2022-02-17 DIAGNOSIS — E66.01 SEVERE OBESITY WITH BODY MASS INDEX (BMI) OF 35.0 TO 39.9 WITH SERIOUS COMORBIDITY (HCC): ICD-10-CM

## 2022-02-17 DIAGNOSIS — Z79.01 CHRONIC ANTICOAGULATION: ICD-10-CM

## 2022-02-17 DIAGNOSIS — I47.29 NSVT (NONSUSTAINED VENTRICULAR TACHYCARDIA): ICD-10-CM

## 2022-02-17 PROCEDURE — 99214 OFFICE O/P EST MOD 30 MIN: CPT | Performed by: INTERNAL MEDICINE

## 2022-02-17 PROCEDURE — 93280 PM DEVICE PROGR EVAL DUAL: CPT | Performed by: INTERNAL MEDICINE

## 2022-02-17 NOTE — PROGRESS NOTES
Cardiac Electrophysiology Office Note     Subjective:      Mary Coombs is a 63 y.o. male patient who presents for follow up, is s/p Medtronic dual chamber pacemaker (DOI 08/17/2012) & AV node ablation (07/16/2018). 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 Previous:  S/p AV node ablation 07/16/2018. Recurrent AF with RVR despite AF ablation 01/10/2017. Admitted with recurrent AF in 2016, was cardioverted. S/p Medtronic dual chamber pacemaker (DOI 08/17/2012).     Dr. Magalys Mcmanus is primary cardiologist.      Problem List  Date Reviewed: 2/17/2022          Codes Class Noted    Severe obesity with body mass index (BMI) of 35.0 to 39.9 with serious comorbidity ([facility] ICD-10-CM: E66.01  ICD-9-CM: 278.01  7/31/2018        Complete AV block due to AV frank ablation [facility]) ICD-10-CM: I97.190, I44.2  ICD-9-CM: 997.1, 426.0  7/16/2018    Overview Signed 7/16/2018  9:20 AM by Khadar John MD     7/16/2018             S/P ablation of atrial fibrillation ICD-10-CM: Z98.890, Z86.79  ICD-9-CM: V45.89  1/10/2017    Overview Addendum 1/11/2017  8:07 AM by JAKI Gastelum Dr 1/10/16  Ablations of paroxysmal atrial fibrillation by pulmonary vein isolations             SVT (supraventricular tachycardia) ([facility] ICD-10-CM: I47.1  ICD-9-CM: 427.89  11/22/2016        Pacemaker ICD-10-CM: Z95.0  ICD-9-CM: V45.01  8/31/2012        Sick sinus syndrome ([facility] ICD-10-CM: I49.5  ICD-9-CM: 427.81  8/17/2012    Overview Signed 8/17/2012  4:12 PM by Khadar John MD     S/p Medtronic dual chamber pacemaker implant 8/17/2012             Cough ICD-10-CM: R05.9  ICD-9-CM: 786.2  8/6/2012        Atelectasis ICD-10-CM: J98.11  ICD-9-CM: 518.0  8/6/2012        Other dyspnea and respiratory abnormality ICD-10-CM: R06.09, R09.89  ICD-9-CM: 786.09 8/6/2012        Sinus bradycardia on ECG ICD-10-CM: R00.1  ICD-9-CM: 427.89  8/6/2012        Dizziness ICD-10-CM: R42  ICD-9-CM: 780.4  6/4/2012        Persistent atrial fibrillation with rapid ventricular response ([facility] ICD-10-CM: I48.19  ICD-9-CM: 427.31  4/30/2012    Overview Addendum 1/10/2017 10:03 PM by Khadar John MD     1/10/2017                   Current Outpatient Medications   Medication Sig Dispense Refill    metoprolol tartrate (LOPRESSOR) 25 mg tablet TAKE 1 TABLET BY MOUTH TWICE A  Tablet 1    dabigatran etexilate (Pradaxa) 150 mg capsule TAKE 1 CAPSULE BY MOUTH EVERY 12 HOURS 180 Capsule 1    acetaminophen (TYLENOL EXTRA STRENGTH) 500 mg tablet Take 1,500 mg by mouth every six (6) hours as needed for Pain (Daily). Allergies   Allergen Reactions    Silvadene [Silver Sulfadiazine] Swelling     Past Medical History:   Diagnosis Date    Atrial fibrillation [facility])     Pacemaker      Past Surgical History:   Procedure Laterality Date    ABLATION, ATRIAL FIBRILLATION   7/25/2012         ECHO INTRACARDIAC  7/25/2012         EP STUDY W/O INDUCTION  7/25/2012         HX PACEMAKER      HX WISDOM TEETH EXTRACTION      40 yrs ago    INS PPM/ICD LED DUAL ONLY  8/17/2012         MN ICAR CATHETER ABLATION ATRIOVENTR NODE FUNCTION  7/16/2018         MN INTRACARDIAC ELECTROPHYSIOLOGIC 3D MAPPING  7/25/2012         MN LEFT HEART CATH BY TRANSEPTAL PUNCTURE  7/25/2012         CHRISTEL ECHO COLOR FLOW  7/25/2012          Family History   Problem Relation Age of Onset    Hypertension Mother      Social History     Tobacco Use    Smoking status: Never Smoker    Smokeless tobacco: Never Used   Substance Use Topics    Alcohol use: No        Review of Systems: All other systems otherwise negative. Constitutional: Negative for fever, chills, weight loss  HEENT: Negative for nosebleeds, vision changes. Respiratory: Negative for cough, hemoptysis, sputum production, and wheezing.    Cardiovascular: Negative for orthopnea, claudication, + leg swelling, no syncope, and PND. + palpitations, rare chest pain  Gastrointestinal: Negative for nausea, vomiting, diarrhea, constipation, blood in stool and melena. Genitourinary: Negative for dysuria, and hematuria. Musculoskeletal: Negative for myalgias, arthralgia. Skin: Negative for rash. Heme: Does not bleed or bruise easily. Neurological: Negative for speech change and focal weakness. Walk with a cane     Objective:     Visit Vitals  /78 (BP 1 Location: Left arm, BP Patient Position: Sitting, BP Cuff Size: Adult long)   Pulse 74   Resp 20   Ht 6' 2\" (1.88 m)   Wt 320 lb 9.6 oz (145.4 kg)   SpO2 97%   BMI 41.16 kg/m²      Physical Exam:   Constitutional: Well-developed and well-nourished. No distress. Head: Normocephalic and atraumatic. Eyes: Pupils are equal, round. Neck: Supple. No JVD present. Cardiovascular: Normal rate, regular rhythm. Exam reveals no gallop and no friction rub. No murmur heard. Pulmonary/Chest: Effort normal and breath sounds normal. No wheezes   Abdominal: Soft, obese. Musculoskeletal: Moves extremities independently. Normal gait. Vasc/lymphatic: trace leg edema. Neurological: Alert,oriented. Skin: Skin is warm and dry. Left-sided pacemaker incision is well healed, unremarkable. Psychiatric: normal mood and affect. Behavior is normal. Judgment and thought content normal.        Imaging/Studies:  Echo (09/19/2019): LVEF 56-60%, mild concentric LVH, no RWMA. Mildly dilated RV. Mod dilated LA. Mild MR. Mild TR. Possible left pleural effusion. Nuclear stress (11/22/2016): LVEF 52%, no definite evidence of ischemia and/or infarction. Assessment/Plan:       ICD-10-CM ICD-9-CM    1. Cardiac pacemaker in situ  Z95.0 V45.01    2. PAF (paroxysmal atrial fibrillation) ([facility])  I48.0 427.31    3. S/P ablation of atrial fibrillation  Z98.890 V45.89     Z86.79     4.  Complete AV block due to AV frank ablation ([facility]  I97.190 997.1     I44.2 426.0    5. Chronic anticoagulation  Z79.01 V58.61    6. Severe obesity with body mass index (BMI) of 35.0 to 39.9 with serious comorbidity ([facility])  E66.01 278.01    7. NSVT (nonsustained ventricular tachycardia) ([facility])  I47.2 427.1         Medtronic dual chamber pacemaker (DOI 08/17/2012): Device check today shows proper lead & generator function. Generator longevity estimated 1 yr, 2 mos. %. AFIB 28%  1 NSVT 7 second last year  Echo is pending to reevaluate LVEF     Persistent AF: Permanently rate control s/p AV node ablation. NSVT: As above. Continue metoprolol as previously prescribed. Anticoagulation:  Continue Pradaxa as previously prescribed for embolic CVA prophylaxis. No bleeding issues. Future Appointments   Date Time Provider Jam Echevarria   2/17/2022  2:40 PM MD MARITZA Chery BS AMB   3/3/2022 10:00 AM ECHOTWO, SARMAD SALAS BS AMB   3/10/2022 10:20 AM MD MARITZA Zavala BS AMB   4/28/2022 10:30 AM JAKI Echevarria AMB   6/8/2022  1:00 PM REMOTE1, SARMAD SALAS BS AMB   9/14/2022  1:00 PM REMOTE1, SARMAD SALAS BS AMB   12/19/2022  1:45 PM REMOTE1, SARMAD SALAS BS AMB   3/28/2023  1:00 PM PACEMAKER3, SARMAD SALAS BS AMB   3/28/2023  1:20 PM MD MARITZA Chery BS AMB     Thank you for involving me in this patient's care and please call with further concerns or questions. Marianna Witt M.D.   Electrophysiology/Cardiology  [facility]  [address] , [address]  [phone]

## 2022-03-03 ENCOUNTER — ANCILLARY PROCEDURE (OUTPATIENT)
Dept: CARDIOLOGY CLINIC | Age: 62
End: 2022-03-03
Payer: COMMERCIAL

## 2022-03-03 VITALS — BODY MASS INDEX: 40.43 KG/M2 | HEIGHT: 74 IN | WEIGHT: 315 LBS

## 2022-03-03 DIAGNOSIS — I48.11 LONGSTANDING PERSISTENT ATRIAL FIBRILLATION (HCC): ICD-10-CM

## 2022-03-03 PROCEDURE — 93306 TTE W/DOPPLER COMPLETE: CPT | Performed by: SPECIALIST

## 2022-03-07 LAB
ECHO AO ASC DIAM: 3.4 CM
ECHO AO ASCENDING AORTA INDEX: 1.28 CM/M2
ECHO AO ROOT DIAM: 3.6 CM
ECHO AO ROOT INDEX: 1.36 CM/M2
ECHO AV AREA PEAK VELOCITY: 2.3 CM2
ECHO AV AREA VTI: 2.5 CM2
ECHO AV AREA/BSA PEAK VELOCITY: 0.9 CM2/M2
ECHO AV AREA/BSA VTI: 0.9 CM2/M2
ECHO AV MEAN GRADIENT: 5 MMHG
ECHO AV MEAN VELOCITY: 1.1 M/S
ECHO AV PEAK GRADIENT: 8 MMHG
ECHO AV PEAK VELOCITY: 1.4 M/S
ECHO AV VELOCITY RATIO: 0.71
ECHO AV VTI: 28.4 CM
ECHO LA DIAMETER INDEX: 1.7 CM/M2
ECHO LA DIAMETER: 4.5 CM
ECHO LA TO AORTIC ROOT RATIO: 1.25
ECHO LA VOL 2C: 82 ML (ref 18–58)
ECHO LA VOL 4C: 88 ML (ref 18–58)
ECHO LA VOL BP: 86 ML (ref 18–58)
ECHO LA VOL/BSA BIPLANE: 32 ML/M2 (ref 16–34)
ECHO LA VOLUME AREA LENGTH: 90 ML
ECHO LA VOLUME INDEX A2C: 31 ML/M2 (ref 16–34)
ECHO LA VOLUME INDEX A4C: 33 ML/M2 (ref 16–34)
ECHO LA VOLUME INDEX AREA LENGTH: 34 ML/M2 (ref 16–34)
ECHO LV E' LATERAL VELOCITY: 9 CM/S
ECHO LV E' SEPTAL VELOCITY: 9 CM/S
ECHO LV EDV A2C: 93 ML
ECHO LV EDV A4C: 104 ML
ECHO LV EDV BP: 101 ML (ref 67–155)
ECHO LV EDV INDEX A4C: 39 ML/M2
ECHO LV EDV INDEX BP: 38 ML/M2
ECHO LV EDV NDEX A2C: 35 ML/M2
ECHO LV EJECTION FRACTION A2C: 48 %
ECHO LV EJECTION FRACTION A4C: 63 %
ECHO LV EJECTION FRACTION BIPLANE: 58 % (ref 55–100)
ECHO LV ESV A2C: 49 ML
ECHO LV ESV A4C: 38 ML
ECHO LV ESV BP: 43 ML (ref 22–58)
ECHO LV ESV INDEX A2C: 18 ML/M2
ECHO LV ESV INDEX A4C: 14 ML/M2
ECHO LV ESV INDEX BP: 16 ML/M2
ECHO LV FRACTIONAL SHORTENING: 43 % (ref 28–44)
ECHO LV INTERNAL DIMENSION DIASTOLE INDEX: 1.77 CM/M2
ECHO LV INTERNAL DIMENSION DIASTOLIC: 4.7 CM (ref 4.2–5.9)
ECHO LV INTERNAL DIMENSION SYSTOLIC INDEX: 1.02 CM/M2
ECHO LV INTERNAL DIMENSION SYSTOLIC: 2.7 CM
ECHO LV IVSD: 1.3 CM (ref 0.6–1)
ECHO LV MASS 2D: 237.9 G (ref 88–224)
ECHO LV MASS INDEX 2D: 89.8 G/M2 (ref 49–115)
ECHO LV POSTERIOR WALL DIASTOLIC: 1.3 CM (ref 0.6–1)
ECHO LV RELATIVE WALL THICKNESS RATIO: 0.55
ECHO LVOT AREA: 3.5 CM2
ECHO LVOT AV VTI INDEX: 0.74
ECHO LVOT DIAM: 2.1 CM
ECHO LVOT MEAN GRADIENT: 2 MMHG
ECHO LVOT PEAK GRADIENT: 4 MMHG
ECHO LVOT PEAK VELOCITY: 1 M/S
ECHO LVOT STROKE VOLUME INDEX: 27.6 ML/M2
ECHO LVOT SV: 73 ML
ECHO LVOT VTI: 21.1 CM
ECHO MV A VELOCITY: 0.44 M/S
ECHO MV E DECELERATION TIME (DT): 221 MS
ECHO MV E VELOCITY: 1 M/S
ECHO MV E/A RATIO: 2.27
ECHO MV E/E' LATERAL: 11.11
ECHO MV E/E' RATIO (AVERAGED): 11.11
ECHO MV E/E' SEPTAL: 11.11
ECHO PULMONARY ARTERY END DIASTOLIC PRESSURE: 8 MMHG
ECHO PV MAX VELOCITY: 1 M/S
ECHO PV PEAK GRADIENT: 4 MMHG
ECHO PV REGURGITANT MAX VELOCITY: 1.5 M/S
ECHO TV REGURGITANT MAX VELOCITY: 2.93 M/S
ECHO TV REGURGITANT PEAK GRADIENT: 34 MMHG

## 2022-03-10 ENCOUNTER — OFFICE VISIT (OUTPATIENT)
Dept: CARDIOLOGY CLINIC | Age: 62
End: 2022-03-10
Payer: COMMERCIAL

## 2022-03-10 VITALS
RESPIRATION RATE: 14 BRPM | HEIGHT: 74 IN | HEART RATE: 77 BPM | BODY MASS INDEX: 40.43 KG/M2 | DIASTOLIC BLOOD PRESSURE: 80 MMHG | WEIGHT: 315 LBS | OXYGEN SATURATION: 98 % | SYSTOLIC BLOOD PRESSURE: 120 MMHG

## 2022-03-10 DIAGNOSIS — R07.9 CHEST PAIN, UNSPECIFIED TYPE: ICD-10-CM

## 2022-03-10 DIAGNOSIS — I48.19 PERSISTENT ATRIAL FIBRILLATION WITH RAPID VENTRICULAR RESPONSE (HCC): Primary | ICD-10-CM

## 2022-03-10 DIAGNOSIS — I49.5 SICK SINUS SYNDROME (HCC): ICD-10-CM

## 2022-03-10 DIAGNOSIS — R06.02 SHORTNESS OF BREATH: ICD-10-CM

## 2022-03-10 PROCEDURE — 99214 OFFICE O/P EST MOD 30 MIN: CPT | Performed by: SPECIALIST

## 2022-03-10 PROCEDURE — 93000 ELECTROCARDIOGRAM COMPLETE: CPT | Performed by: SPECIALIST

## 2022-03-10 NOTE — PROGRESS NOTES
[facility]                                                            OFFICE NOTE        Cherrie Rahman M.D.,CL[facility]RE[facility]   1960  [id number]    Date/Time:  3/10/994662:28 AM            SUBJECTIVE:  He is doing ok no cp or sob or palpitations   Wt Readings from Last 3 Encounters:   03/10/22 321 lb (145.6 kg)   03/03/22 320 lb (145.2 kg)   02/17/22 320 lb 9.6 oz (145.4 kg)            Assessment/Plan  1. Atrial fibrillation: this is chronic . Status post atrial fibrillation ablation 2012 in 2017. He has had recurrence of atrial fibrillation and eventually he underwent successful AV node ablation in 2018. Status post permanent pacemaker placement at that time as well.     His electrocardiogram today reveals a likely underlying atrial fibrillation ventricular paced rhythm.     He remains on Pradaxa with no untoward effects thus far.     Shortness of breath reported somewhat inconsistent     Discussed recent echo with normal ef and mild mr , no additional interventions for now     3. Hypertension: None and actually blood pressure the lower limits of normal he denies any particular dizziness at this time.     4. Chest pain: not recurrent     5. Permanent pacemaker: Closely followed by Dr. Juan M Ledesma.     Otherwise see him back in 6 months or sooner if any issues        HPI     61 y. o. male.h/o ATRIAL FIBRILLATION,  This was discovered by his pcp when patient was seen for wrist injurie.   Nuclear stress test on 2/12 no ischemia or mi and EF of 51%  Echo on 1/12 normal EF no gross RWMA  24 hours holter on 2/12 afib   Dc cardioversion on 5/12 attempted several times with also the addition of corvert with no restoration of NSR  He has undergone PPM and atrial fibrillation ablation with Dr. Kate Temple in 8/12      Admitted to [facility] on 11/16 for cp and AF with RVR  Converted with ibutilide and then started on sotalol, also [address] started      Nuclear stress test on 11/16:1. No definite evidence of ischemia and/or infarction. 2. LVEF equals 52%. Left ventricular wall motion and thickening is normal.  On 1/17 : ABLATION OF AF     Recurrent PAF on 2018 off on despite increase in medications  Eventually he underwent on 7/18 with Dr. Farah:AV frank ablation and His bundle recording with fluoroscopy.  pacemaker was re-interrogated and reprogrammed before and after the ablation.     PMH: as above and some djd    PSH: none    FH: not significant for early CAD or SCD   SH: no etoh or tobacco, works as a                CARDIAC STUDIES        03/03/22    ECHO ADULT COMPLETE 03/07/2022 3/7/2022    Interpretation Summary    Left Ventricle: Left ventricle size is normal. Mildly increased wall thickness. Normal wall motion. Normal left ventricular systolic function. EF by 2D Simpsons Biplane is 58%. Normal diastolic function.   Right Ventricle: Not well visualized.   Mitral Valve: Mild transvalvular regurgitation.   Left Atrium: Left atrium is mildly dilated. Signed by: Carmencita Sever, MD on 3/7/2022  1:06 PM            11/21/16    NM CARDIAC SPECT W STRS/REST MULT 11/23/2016 11/23/2016    Narrative  Indication: Chest pain    A Lexiscan myocardial SPECT gated wall motion study was performed utilizing 32  mCi of Tc MYOVIEW for rest and 33 mCi for stress. SPECT imaging at stress and rest demonstrates no definite evidence of ischemia  and/or infarction. There is slight thinning of the inferior wall most likely attenuation artifact. Left ventricular ejection fraction equals 52%. Left ventricular wall motion and  thickening are within normal limits. Impression  IMPRESSION:  1. No definite evidence of ischemia and/or infarction. 2. LVEF equals 52%.  Left ventricular wall motion and thickening is normal.    Signed by: Jovanny Espitia MD on 11/23/2016 10:00 AM                    EKG Results     Procedure 720 Value Units Date/Time    AMB POC EKG ROUTINE W/ 12 LEADS, INTER & REP [[id number]] Resulted: 03/10/22 1019    Order Status: Completed Updated: 03/10/22 1019              IMAGING      MRI Results (most recent):  No results found for this or any previous visit. CT Results (most recent):  Results from Hospital Encounter encounter on 12/13/16    CTA CHEST W WO CONT    Narrative  CT chest - pulmonary vein protocol    CPT code: 25324    INDICATION: Atrial fibrillation. Pulmonary vein mapping study. COMPARISON: 7/9/2012    TECHNIQUE: Thin collimation noncontrast and contrast enhanced axial images  obtained through the level of the pulmonary vasculature. Raw data initially  reviewed. There was uneventful administration of 75 mL's of Isovue-370 as  intravenous contrast material. Following this, image reconstruction performed on  a [employer] Advantage workstation with 3-D, maximum intensity projection and  volumetric rendering of the pulmonary veins reviewed. CT dose reduction was  achieved through use of a standardized protocol tailored for this examination  and automatic exposure control for dose modulation. COMPARISON: 7912    FINDINGS:  The images of the chest began at about the level of aortic arch and extending to  the lung bases. The entirety of the lungs are not evaluated. The visualized  lungs demonstrate no suspicious nodules. The mediastinal windows demonstrate no  mediastinal or hilar adenopathy. There is a pacemaker in place with one lead in  the right atrium the other in the right ventricle. The right ventricle right  atrium are mildly enlarged as is the main pulmonary artery segment ingesting  pulmonary hypertension. The left atrial appendage is well opacified no evidence for thrombus. .    Right superior pulmonary vein: Venous trunk measures approximately 3.3 x 2.8 cm  diameter, extending 7 mm until  first branching.     Approximately 7 mm between the origins of the right superior and inferior  pulmonary veins.    Right inferior pulmonary vein: Venous trunk measures approximately 2.4 x 2.0 cm  diameter, extending 4.0 mm until first branching. Left superior pulmonary vein: Venous trunk measures approximately 1.8 x 1.3 cm  diameter, extending 6.5 mm until first branching. Approximately 6 mm between the origins of the left superior and inferior  pulmonary veins. Left inferior pulmonary vein: Venous trunk measures approximately 2.0 x 1.5 cm,  extending 14.2 mm until first branching. Impression  IMPRESSION:  1. Pulmonary venous anatomy as outlined above. No anomalies are detected. 2.  There is enlargement of the main pulmonary artery segment, right ventricle  and right atrium suggesting elevated right heart pressures and pulmonary  hypertension. .      XR Results (most recent):  Results from Hospital Encounter encounter on 12/12/18    XR CHEST PA LAT    Narrative  EXAM:  XR CHEST PA LAT    INDICATION:  Cough, history of pneumonia. COMPARISON: November 22, 2016    TECHNIQUE: PA and lateral chest views    FINDINGS: Left chest wall dual chamber pacemaker in unchanged position. Heart  size is normal. No pericardial or pleural effusion. The lungs are clear. Osseous  structures are age-appropriate. Impression  IMPRESSION:    No evidence of pneumonia.           Past Medical History:   Diagnosis Date    Atrial fibrillation [facility])     Pacemaker      Past Surgical History:   Procedure Laterality Date    ABLATION, ATRIAL FIBRILLATION   7/25/2012         ECHO INTRACARDIAC  7/25/2012         EP STUDY W/O INDUCTION  7/25/2012         HX PACEMAKER      HX WISDOM TEETH EXTRACTION      40 yrs ago    INS PPM/ICD LED DUAL ONLY  8/17/2012         ID ICAR CATHETER ABLATION ATRIOVENTR NODE FUNCTION  7/16/2018         ID INTRACARDIAC ELECTROPHYSIOLOGIC 3D MAPPING  7/25/2012         ID LEFT HEART CATH BY TRANSEPTAL PUNCTURE  7/25/2012         CHRISTEL ECHO COLOR FLOW  7/25/2012          Social History     Tobacco Use  Smoking status: Never Smoker    Smokeless tobacco: Never Used   Substance Use Topics    Alcohol use: No    Drug use: No     Family History   Problem Relation Age of Onset    Hypertension Mother      Allergies   Allergen Reactions    Silvadene [Silver Sulfadiazine] Swelling         Visit Vitals  /80 (BP 1 Location: Left arm, BP Patient Position: Sitting, BP Cuff Size: Adult)   Pulse 77   Resp 14   Ht 6' 2\" (1.88 m)   Wt 321 lb (145.6 kg)   SpO2 98%   BMI 41.21 kg/m²         Last 3 Recorded Weights in this Encounter    03/10/22 1014   Weight: 321 lb (145.6 kg)            Review of Systems:   Pertinent items are noted in the History of Present Illness. Visit Vitals  /80 (BP 1 Location: Left arm, BP Patient Position: Sitting, BP Cuff Size: Adult)   Pulse 77   Resp 14   Ht 6' 2\" (1.88 m)   Wt 321 lb (145.6 kg)   SpO2 98%   BMI 41.21 kg/m²     General Appearance:  Well developed, well nourished,alert and oriented x 3, and individual in no acute distress. Ears/Nose/Mouth/Throat:   Hearing grossly normal.         Neck: Supple. Chest:   Lungs clear to auscultation bilaterally. Cardiovascular:  Regular rate and rhythm, S1, S2 normal, no murmur. Abdomen:   Soft, non-tender, bowel sounds are active. Extremities: No edema bilaterally. Skin: Warm and dry. Current Outpatient Medications on File Prior to Visit   Medication Sig Dispense Refill    metoprolol tartrate (LOPRESSOR) 25 mg tablet TAKE 1 TABLET BY MOUTH TWICE A  Tablet 1    dabigatran etexilate (Pradaxa) 150 mg capsule TAKE 1 CAPSULE BY MOUTH EVERY 12 HOURS 180 Capsule 1    acetaminophen (TYLENOL EXTRA STRENGTH) 500 mg tablet Take 1,500 mg by mouth every six (6) hours as needed for Pain (Daily). No current facility-administered medications on file prior to visit. Sam Clements had no medications administered during this visit.      Current Outpatient Medications   Medication Sig    metoprolol tartrate (LOPRESSOR) 25 mg tablet TAKE 1 TABLET BY MOUTH TWICE A DAY    dabigatran etexilate (Pradaxa) 150 mg capsule TAKE 1 CAPSULE BY MOUTH EVERY 12 HOURS    acetaminophen (TYLENOL EXTRA STRENGTH) 500 mg tablet Take 1,500 mg by mouth every six (6) hours as needed for Pain (Daily). No current facility-administered medications for this visit. Lab Results   Component Value Date/Time    Cholesterol, total 141 10/28/2021 11:20 AM    HDL Cholesterol 45 10/28/2021 11:20 AM    LDL, calculated 76 10/28/2021 11:20 AM    LDL, calculated 47.2 11/22/2016 04:33 AM    VLDL, calculated 20 10/28/2021 11:20 AM    VLDL, calculated 11.8 11/22/2016 04:33 AM    Triglyceride 111 10/28/2021 11:20 AM    CHOL/HDL Ratio 2.4 11/22/2016 04:33 AM       Lab Results   Component Value Date/Time    Sodium 143 10/28/2021 11:20 AM    Potassium 5.2 10/28/2021 11:20 AM    Chloride 102 10/28/2021 11:20 AM    CO2 26 10/28/2021 11:20 AM    Anion gap 8 07/16/2018 07:53 AM    Glucose 101 (H) 10/28/2021 11:20 AM    BUN 12 10/28/2021 11:20 AM    Creatinine 0.90 10/28/2021 11:20 AM    BUN/Creatinine ratio 13 10/28/2021 11:20 AM    GFR est  10/28/2021 11:20 AM    GFR est non-AA 93 10/28/2021 11:20 AM    Calcium 9.4 10/28/2021 11:20 AM       Lab Results   Component Value Date/Time    ALT (SGPT) 23 10/28/2021 11:20 AM    Alk.  phosphatase 85 10/28/2021 11:20 AM    Bilirubin, total 0.4 10/28/2021 11:20 AM       Lab Results   Component Value Date/Time    WBC 6.5 10/28/2021 11:20 AM    HGB 15.6 10/28/2021 11:20 AM    HCT 46.5 10/28/2021 11:20 AM    PLATELET 651 39/34/8362 11:20 AM    MCV 97 10/28/2021 11:20 AM       Lab Results   Component Value Date/Time    TSH 1.810 10/28/2021 11:20 AM         Lab Results   Component Value Date/Time    Cholesterol, total 141 10/28/2021 11:20 AM    Cholesterol, total 100 11/22/2016 04:33 AM    HDL Cholesterol 45 10/28/2021 11:20 AM    HDL Cholesterol 41 11/22/2016 04:33 AM    LDL, calculated 76 10/28/2021 11:20 AM    LDL, calculated 47.2 11/22/2016 04:33 AM    Triglyceride 111 10/28/2021 11:20 AM    Triglyceride 59 11/22/2016 04:33 AM    CHOL/HDL Ratio 2.4 11/22/2016 04:33 AM                Please note that this dictation was completed with [employer], the computer voice recognition software. Quite often unanticipated grammatical, syntax, homophones, and other interpretative errors are inadvertently transcribed by the computer software. Please disregard these errors. Please excuse any errors that have escaped final proofreading.

## 2022-03-10 NOTE — PROGRESS NOTES
Visit Vitals  /80 (BP 1 Location: Left arm, BP Patient Position: Sitting, BP Cuff Size: Adult)   Pulse 77   Resp 14   Ht 6' 2\" (1.88 m)   Wt 321 lb (145.6 kg)   SpO2 98%   BMI 41.21 kg/m²

## 2022-03-19 PROBLEM — Z86.79 S/P ABLATION OF ATRIAL FIBRILLATION: Status: ACTIVE | Noted: 2017-01-10

## 2022-03-19 PROBLEM — E66.01 SEVERE OBESITY WITH BODY MASS INDEX (BMI) OF 35.0 TO 39.9 WITH SERIOUS COMORBIDITY (HCC): Status: ACTIVE | Noted: 2018-07-31

## 2022-03-19 PROBLEM — Z98.890 S/P ABLATION OF ATRIAL FIBRILLATION: Status: ACTIVE | Noted: 2017-01-10

## 2022-03-20 PROBLEM — I44.2 COMPLETE AV BLOCK DUE TO AV NODAL ABLATION (HCC): Status: ACTIVE | Noted: 2018-07-16

## 2022-03-20 PROBLEM — I97.190 COMPLETE AV BLOCK DUE TO AV NODAL ABLATION (HCC): Status: ACTIVE | Noted: 2018-07-16

## 2022-04-28 ENCOUNTER — VIRTUAL VISIT (OUTPATIENT)
Dept: INTERNAL MEDICINE CLINIC | Age: 62
End: 2022-04-28
Payer: COMMERCIAL

## 2022-04-28 DIAGNOSIS — M25.552 HIP PAIN, ACUTE, LEFT: ICD-10-CM

## 2022-04-28 DIAGNOSIS — I48.19 PERSISTENT ATRIAL FIBRILLATION WITH RAPID VENTRICULAR RESPONSE (HCC): Primary | ICD-10-CM

## 2022-04-28 DIAGNOSIS — E66.01 SEVERE OBESITY WITH BODY MASS INDEX (BMI) OF 35.0 TO 39.9 WITH SERIOUS COMORBIDITY (HCC): ICD-10-CM

## 2022-04-28 PROCEDURE — 99214 OFFICE O/P EST MOD 30 MIN: CPT | Performed by: INTERNAL MEDICINE

## 2022-04-28 NOTE — PROGRESS NOTES
ADVISED PATIENT OF THE FOLLOWING HEALTH MAINTAINCE DUE  Health Maintenance Due   Topic Date Due    Colorectal Cancer Screening Combo  Never done      Chief Complaint   Patient presents with    Irregular Heart Beat    Hip Pain       1. Have you been to the ER, urgent care clinic since your last visit? Hospitalized since your last visit? No    2. Have you seen or consulted any other health care providers outside of the [address] [place] [address] since your last visit? Include any DEXA scan, mammography  or colon screening. No    3. Do you have an Advance Directive on file? no    4. Do you have a DNR on file? no    Patient is accompanied by self I have received verbal consent from Francis Houston to discuss any/all medical information while they are present in the room.       Advance Care Planning 1/10/2017   Patient's Healthcare Decision Maker is: Legal Next of Kin   Primary Decision Maker Name Alli Wade   Primary Decision Maker Phone Number [phone]   Primary Decision Maker Relationship to Patient Sibling   Confirm Advance Directive None   Patient Would Like to Complete Advance Directive Yes         [id number], [address] [place] [id number]  Phone: [phone] Fax: [phone]

## 2022-04-28 NOTE — PROGRESS NOTES
Hannah March is a 63 y.o. male who was seen by synchronous (real-time) audio-video technology on 4/28/2022 for Irregular Heart Beat and Hip Pain        Assessment & Plan:   Diagnoses and all orders for this visit:    1. Persistent atrial fibrillation with rapid ventricular response ([facility]  - continues to see cardiology as planned   2. Severe obesity with body mass index (BMI) of 35.0 to 39.9 with serious comorbidity ([facility]    3. Hip pain, acute, left  - continue to use of tylenol 500 mg as needed every 8 hours   -ROM exercises     Follow-up and Dispositions    · Return in about 6 months (around 10/28/2022), or if symptoms worsen or fail to improve. Subjective:     Complaints of pain in hip when walking and moving around a lot. Left hip. Pain only in hip area. No swelling or tingling. Started about a month ago. Not worse or lessened. Icy Hot and Tylenol helps. Denies back pain. Walking sometimes makes the pain go away. Does a lot of manual labor with employment. Cardiology followed regularly. Next visit within the next 6 months. BP taken frequently at home and at work with home BP cuff. Today's /78 per patient. HR now 79 per patient's smart watch. Will be getting a new pacemaker next year. Colonoscopy do be done. Patient to schedule. Prior to Admission medications    Medication Sig Start Date End Date Taking? Authorizing Provider   metoprolol tartrate (LOPRESSOR) 25 mg tablet TAKE 1 TABLET BY MOUTH TWICE A DAY 12/21/21  Yes Ramona Montero NP   dabigatran etexilate (Pradaxa) 150 mg capsule TAKE 1 CAPSULE BY MOUTH EVERY 12 HOURS 11/22/21  Yes Byron Burt MD   acetaminophen (TYLENOL EXTRA STRENGTH) 500 mg tablet Take 1,500 mg by mouth every six (6) hours as needed for Pain (Daily).    Yes Provider, Historical     Patient Active Problem List   Diagnosis Code    Persistent atrial fibrillation with rapid ventricular response (HCC) I48.19    Dizziness R42    Cough R05.9    Atelectasis J98.11    Other dyspnea and respiratory abnormality R06.09, R09.89    Sinus bradycardia on ECG R00.1    Sick sinus syndrome (HCC) I49.5    Pacemaker Z95.0    SVT (supraventricular tachycardia) ([facility]) I47.1    S/P ablation of atrial fibrillation Z98.890, Z86.79    Complete AV block due to AV frank ablation ([facility]) I97.190, I44.2    Severe obesity with body mass index (BMI) of 35.0 to 39.9 with serious comorbidity ([facility]) E66.01     Patient Active Problem List    Diagnosis Date Noted    Severe obesity with body mass index (BMI) of 35.0 to 39.9 with serious comorbidity ([facility] 07/31/2018    Complete AV block due to AV frank ablation ([facility] 07/16/2018    S/P ablation of atrial fibrillation 01/10/2017    SVT (supraventricular tachycardia) ([facility] 11/22/2016    Pacemaker 08/31/2012    Sick sinus syndrome (HCC) 08/17/2012    Cough 08/06/2012    Atelectasis 08/06/2012    Other dyspnea and respiratory abnormality 08/06/2012    Sinus bradycardia on ECG 08/06/2012    Dizziness 06/04/2012    Persistent atrial fibrillation with rapid ventricular response ([facility]) 04/30/2012     Current Outpatient Medications   Medication Sig Dispense Refill    metoprolol tartrate (LOPRESSOR) 25 mg tablet TAKE 1 TABLET BY MOUTH TWICE A  Tablet 1    dabigatran etexilate (Pradaxa) 150 mg capsule TAKE 1 CAPSULE BY MOUTH EVERY 12 HOURS 180 Capsule 1    acetaminophen (TYLENOL EXTRA STRENGTH) 500 mg tablet Take 1,500 mg by mouth every six (6) hours as needed for Pain (Daily).        Allergies   Allergen Reactions    Silvadene [Silver Sulfadiazine] Swelling     Past Medical History:   Diagnosis Date    Atrial fibrillation [facility])     Pacemaker      Past Surgical History:   Procedure Laterality Date    ABLATION, ATRIAL FIBRILLATION   7/25/2012         ECHO INTRACARDIAC  7/25/2012         EP STUDY W/O INDUCTION  7/25/2012         HX PACEMAKER      HX WISDOM TEETH EXTRACTION      40 yrs ago    INS PPM/ICD LED DUAL ONLY  8/17/2012  [place] ICAR CATHETER ABLATION ATRIOVENTR NODE FUNCTION  7/16/2018         [place] INTRACARDIAC ELECTROPHYSIOLOGIC 3D MAPPING  7/25/2012         [place] LEFT HEART CATH BY TRANSEPTAL PUNCTURE  7/25/2012         CHRISTEL ECHO COLOR FLOW  7/25/2012          Family History   Problem Relation Age of Onset    Hypertension Mother      Social History     Tobacco Use    Smoking status: Never Smoker    Smokeless tobacco: Never Used   Substance Use Topics    Alcohol use: No       Review of Systems   Constitutional: Negative. Respiratory: Negative. Cardiovascular: Negative. Gastrointestinal: Negative. Musculoskeletal: Positive for joint pain. Negative for back pain and falls. Neurological: Negative.         Objective:     Patient-Reported Vitals 4/28/2022   Patient-Reported Pulse 86   Patient-Reported Systolic  822   Patient-Reported Diastolic 78        [INSTRUCTIONS:  \"[x]\" Indicates a positive item  \"[]\" Indicates a negative item  -- DELETE ALL ITEMS NOT EXAMINED]    Constitutional: [x] Appears well-developed and well-nourished [x] No apparent distress      [] Abnormal -     Mental status: [x] Alert and awake  [x] Oriented to person/place/time [x] Able to follow commands    [] Abnormal -     Eyes:   EOM    [x]  Normal    [] Abnormal -   Sclera  [x]  Normal    [] Abnormal -          Discharge [x]  None visible   [] Abnormal -     HENT: [x] Normocephalic, atraumatic  [] Abnormal -   [x] Mouth/Throat: Mucous membranes are moist    External Ears [x] Normal  [] Abnormal -    Neck: [x] No visualized mass [] Abnormal -     Pulmonary/Chest: [x] Respiratory effort normal   [x] No visualized signs of difficulty breathing or respiratory distress        [] Abnormal -      Musculoskeletal:   [x] Normal gait with no signs of ataxia         [x] Normal range of motion of neck        [] Abnormal -     Neurological:        [x] No Facial Asymmetry (Cranial nerve 7 motor function) (limited exam due to video visit)          [x] No gaze palsy        [] Abnormal -          Skin:        [x] No significant exanthematous lesions or discoloration noted on facial skin         [] Abnormal -            Psychiatric:       [x] Normal Affect [] Abnormal -        [x] No Hallucinations    Other pertinent observable physical exam findings:-        We discussed the expected course, resolution and complications of the diagnosis(es) in detail. Medication risks, benefits, costs, interactions, and alternatives were discussed as indicated. I advised him to contact the office if his condition worsens, changes or fails to improve as anticipated. He expressed understanding with the diagnosis(es) and plan. Carolene Runner, was evaluated through a synchronous (real-time) audio-video encounter. The patient (or guardian if applicable) is aware that this is a billable service, which includes applicable co-pays. Verbal consent to proceed has been obtained. The visit was conducted pursuant to the emergency declaration under the [address] [place] [address] authority and the [employer] General Act. Patient identification was verified, and a caregiver was present when appropriate. The patient was located at home in a state where the provider was licensed to provide care.       Chris Rader NP

## 2022-05-19 RX ORDER — DABIGATRAN ETEXILATE 150 MG/1
CAPSULE ORAL
Qty: 180 CAPSULE | Refills: 1 | Status: SHIPPED | OUTPATIENT
Start: 2022-05-19

## 2022-05-19 NOTE — TELEPHONE ENCOUNTER
Cardiologist: Dr. Willie Ibarra    Last appt: 7/13/2020  Future Appointments   Date Time Provider Jam Echevarria   6/8/2022  1:00 PM REMOTE1, SARMAD NORMAN AMB   9/13/2022 10:40 AM MD MARITZA Escobedo AMB   9/14/2022  1:00 PM REMOTE1, SARMAD NORMAN AMB   12/19/2022  1:45 PM REMOTE1, SARMAD NORMAN AMB   3/28/2023  1:00 PM PACEMAKER3, SARMAD NORMAN AMB   3/28/2023  1:20 PM MD MARITZA Cody BS AMB       Requested Prescriptions     Signed Prescriptions Disp Refills    dabigatran etexilate (Pradaxa) 150 mg capsule 180 Capsule 1     Sig: TAKE 1 CAPSULE BY MOUTH EVERY 12 HOURS     Authorizing Provider: Loki Soliman     Ordering User: SONY MCGUIRE         Refjoe VO per Dr. Willie Ibarra.

## 2022-06-08 ENCOUNTER — TELEPHONE (OUTPATIENT)
Dept: CARDIOLOGY CLINIC | Age: 62
End: 2022-06-08

## 2022-06-08 NOTE — TELEPHONE ENCOUNTER
Patient is calling to notify us that he spoke to [facility] and they will be sending him a replacement part to his device .

## 2022-06-08 NOTE — TELEPHONE ENCOUNTER
Patient would like a call back from the device clinic, unable to sent a transmission from his device, please advise  Provided number to [email or web address]     [phone]

## 2022-06-14 ENCOUNTER — TELEPHONE (OUTPATIENT)
Dept: CARDIOLOGY CLINIC | Age: 62
End: 2022-06-14

## 2022-06-14 ENCOUNTER — OFFICE VISIT (OUTPATIENT)
Dept: CARDIOLOGY CLINIC | Age: 62
End: 2022-06-14
Payer: COMMERCIAL

## 2022-06-14 DIAGNOSIS — Z86.79 S/P ABLATION OF ATRIAL FIBRILLATION: ICD-10-CM

## 2022-06-14 DIAGNOSIS — Z95.0 CARDIAC PACEMAKER IN SITU: Primary | ICD-10-CM

## 2022-06-14 DIAGNOSIS — I49.5 SICK SINUS SYNDROME (HCC): ICD-10-CM

## 2022-06-14 DIAGNOSIS — I48.19 PERSISTENT ATRIAL FIBRILLATION WITH RAPID VENTRICULAR RESPONSE (HCC): ICD-10-CM

## 2022-06-14 DIAGNOSIS — I48.0 PAF (PAROXYSMAL ATRIAL FIBRILLATION) (HCC): ICD-10-CM

## 2022-06-14 DIAGNOSIS — I44.2 COMPLETE AV BLOCK DUE TO AV NODAL ABLATION (HCC): ICD-10-CM

## 2022-06-14 DIAGNOSIS — I48.11 LONGSTANDING PERSISTENT ATRIAL FIBRILLATION (HCC): ICD-10-CM

## 2022-06-14 DIAGNOSIS — Z98.890 S/P ABLATION OF ATRIAL FIBRILLATION: ICD-10-CM

## 2022-06-14 DIAGNOSIS — I97.190 COMPLETE AV BLOCK DUE TO AV NODAL ABLATION (HCC): ICD-10-CM

## 2022-06-14 PROCEDURE — 93294 REM INTERROG EVL PM/LDLS PM: CPT | Performed by: INTERNAL MEDICINE

## 2022-06-14 PROCEDURE — 93296 REM INTERROG EVL PM/IDS: CPT | Performed by: INTERNAL MEDICINE

## 2022-06-14 NOTE — TELEPHONE ENCOUNTER
----- Message from Luis Alberto Ramey sent at 2022 11:35 AM EDT -----  Regardin months to OLEG  Patient has 6 months to OLEG on today's overdue remote check. He has a dual chamber pacemaker and is dependent.      Ivette Tidwell

## 2022-06-14 NOTE — LETTER
6/22/2022 2:52 PM    Mr. Fofana Landing  [address] [place] [id number]      You are scheduled for the following procedure with Dr. Peña Nearing:  Pacemaker generator change at [facility], [facility], [address]      PLEASE be aware that your procedure date/time is tentative and subject to change due to emergency cases. Procedure date/time:    December 30, 2022  Time: 8:00 am - please arrive by 6:15 am      ARRIVAL time:  (You will need  to be discharged home with.)     [X]  [facility] procedures: arrive to check in on 1st floor near [address] [facility] entrance two hours prior to your procedure. Pre-procedure Labs/Imaging:    o PRE-PROCEDURE LABS NEEDED: YES   o Forms for labwork may be given at appointment. If not, they will be mailed to you. Labs should be completed within 5-7 days of procedure. These can be done at any [facility] lab (one is located in [address] [place] [address]. No appointment needed). Medication Instructions:    Please hold your PRADAXA 2 days prior to your procedure. Hibiclens 4% topical solution has been ordered and sent into your pharmacy  Patient it start Hibiclens application 5 days prior to procedure date    Directions Hibiclens 4%: Start cleanse 5 days prior to procedure   1. Rinse area (upper chest and upper arms) with water. 2. Apply minimum amount necessary to scrub the upper chest area from shoulder/neck to mid line of chest and to below the nipple each of  5 nights before the day of the procedure  3. Let solution dry. Nothing to eat or drink after midnight before your procedure. You may take all other medications as directed the morning of your procedure with a sip of water unless otherwise indicated. Please contact the office [phone] and ask for a member of Dr. Virginia Shine team for procedure questions. There is a physician on call for the office after hours for immediate needs.      FOLLOW UP:   Your appointments will be made for you post procedure based off of discharge instructions. You may have driving restrictions for a short time after your procedure (usually 2-3 days). Pacemaker/ICD patients will be unable to have an MRI until 6 weeks after implant, NO dental work for 8 weeks after your implant. Future Appointments   Date Time Provider Jam Swathi   9/13/2022 10:40 AM MD MARITZA Masters BS AMB   9/14/2022  1:00 PM REMOTE1, SARMAD NORMAN AMB   12/19/2022  1:45 PM REMOTE1, SARMAD SALAS BS AMB   1/13/2023  9:00 AM PACEMAKER3, SARMAD SALAS BS AMB   1/13/2023  9:30 AM WOUND CHECKS, SARMAD SALAS BS AMB   3/28/2023  1:00 PM PACEMAKER3, SARMAD KATEREY BS AMB   3/28/2023  1:20 PM MD MARITZA Tipton BS AMB       Sincerely,      Kaci Lawrence MD   PATIENT INSTRUCTIONS POST-PACEMAKER IMPLANT    1. No heavy lifting or exercises with the left arm for 4 weeks. This includes the following:  Do not use the affected arm to pull up or push up from a seated or laying  down position. Do not allow anyone else to pull on the affected arm. 2.  Your dressing will be removed at your 2 week follow up appointment. Your incision will have skin glue covering the incision, the skin glue will help to reinforce the incision to prevent it from opening, please DO NOT attempt to peel the glue off of the incision. You do have sutures on the inside of the incision that are not visible. Please DO NOT try to scrub the skin glue off once you are able to take a shower. The skin glue will eventually fall off     3. Do not drive for 3 days      Call Dr. Gurmeet Yoo at [phone] if you experience any of the following symptoms:  1. Redness at the pacemaker site  2. Swelling at or around the pacemaker or in the left arm  3. Pain around the pacemaker  4. Dizziness, lightheadedness, fainting spells  5. Lack of energy  6. Shortness of breath  7. Rapid heart rate  8.    Chest or muscle twitches      You may use pain medication and ice pack for pain relief as needed. You may wear the sling as a reminder to keep your arm below the your shoulder. Felix Palomo M.D.  [address] [facility]  Electrophysiology/Cardiology  [facility]    [address], [address] [facility], Henok Bain [address] [place] [id number]

## 2022-06-14 NOTE — LETTER
6/14/2022 11:32 AM    Mr. Kenneth Real  [address] [facility] [id number]            This letter confirms that we have received your scheduled remote check of your implanted     device on 6-14-22  . Our EP team will contact you via phone if there are significant abnormal    findings. Your next remote check from home is scheduled for 9-14-22  . If you have any questions, please call [address] [place] [address] at [phone].                Sincerely,    Jovany Swanson MD [facility]

## 2022-06-17 NOTE — TELEPHONE ENCOUNTER
MD Cordelia Veras RN  It is fine   Need limited 2 D echo before replacement to make sure he does not need BIV pacer upgrade             Previous Messages       ----- Message -----   From: Cordelia Moralez RN   Sent: 6/15/2022   9:58 AM EDT   To: Alin Piña MD   Subject: RE: 6 months to OLEG                               He has an existing appt with Dr. Neva Grier on 9/13/22Jil Reasoner this suffice for his H&P, or does he need to see Darnell Marroquin?   ----- Message -----   From: Benjie Rodriguez MD   Sent: 6/15/2022   6:53 AM EDT   To: Jazz Kruse RN, John Gonzales NP, *   Subject: RE: 6 months to OLEG                               We can schedule him for gen change in 3-4 months   He usually needs his brother's help   ----- Message -----   From: Stephanie Byrnes   Sent: 6/14/2022  11:36 AM EDT   To: Alin Piña MD, Jazz Kruse RN, *   Subject: 6 months to OLEG                                   Patient has 6 months to OLEG on today's overdue remote check. He has a dual chamber pacemaker and is dependent.      Jethro Leader

## 2022-06-22 RX ORDER — METOPROLOL TARTRATE 25 MG/1
TABLET, FILM COATED ORAL
Qty: 180 TABLET | Refills: 1 | Status: SHIPPED | OUTPATIENT
Start: 2022-06-22

## 2022-06-22 RX ORDER — CHLORHEXIDINE GLUCONATE 4 G/100ML
SOLUTION TOPICAL
Qty: 1 EACH | Refills: 0 | Status: SHIPPED | OUTPATIENT
Start: 2022-06-22

## 2022-06-22 NOTE — TELEPHONE ENCOUNTER
Received call from patient, ID verified using two patient identifiers. Patient scheduled for his [employer] dual chamber pacemaker generator change with Dr. Anselmo Hugo on Friday, 12/30/22 at [facility] at 8:00 am.  Instructed patient to arrive at 6:15 am.    Reviewed pre-procedure instructions with patient and time allowed for questions. Instructions to be mailed today to the address confirmed on file. Patient also requesting instructions be sent via [employer]. Patient verbalized understanding and will call with any other questions.       Future Appointments   Date Time Provider Jam Echevarria   9/13/2022 10:40 AM MD MARITZA Bar BS AMB   9/14/2022  1:00 PM REMOTE1, SARMAD NORMAN AMB   12/19/2022  1:45 PM REMOTE1, SARMAD NORMAN AMB   1/13/2023  9:00 AM PACEMAKER3, SARMAD NORMAN AMB   1/13/2023  9:30 AM WOUND CHECKS, SARMAD NORMAN AMB   3/28/2023  1:00 PM PACEMAKER3, SARMAD NORMAN AMB   3/28/2023  1:20 PM MD MARITZA Renteria BS AMB

## 2022-06-22 NOTE — TELEPHONE ENCOUNTER
Received refill request for metoprolol 25 mg po tabs. Refill authorized.     Future Appointments   Date Time Provider Jam Echevarria   9/13/2022 10:40 AM MD MARITZA Lin BS AMB   9/14/2022  1:00 PM REMOTE1, SARMAD NORMAN AMB   12/19/2022  1:45 PM REMOTE1, SARMAD NORMAN AMB   3/28/2023  1:00 PM PACEMAKER3, SARMAD NORMAN AMB   3/28/2023  1:20 PM Denver Hiss, MD CAVREY BS AMB

## 2022-06-22 NOTE — TELEPHONE ENCOUNTER
Received call from patient, ID verified using two patient identifiers. Advised patient that I had called him on Monday to follow up on a call placed to him earlier. See below. After reviewing patient's chart it appears he had a echocardiogram on 3/3/22 and one will not be needed prior to scheduling his generator change. Advised patient that I will call him back once I clarify. Patient verbalized understanding and will call with any other questions. Paras Silverman RN 5 days ago        MD Paras Franklin RN  It is fine   Need limited 2 D echo before replacement to make sure he does not need BIV pacer upgrade               Previous Messages       ----- Message -----   From: Paras Silverman RN   Sent: 6/15/2022   9:58 AM EDT   To: Dede Corea MD   Subject: RE: 6 months to OLEG                               He has an existing appt with Dr. Madhav Cerrato on 9/13/22Leartis Harness this suffice for his H&P, or does he need to see Waldemar Belcher?   ----- Message -----   From: Lawyer Susan MD   Sent: 6/15/2022   6:53 AM EDT   To: Vicente Ward RN, Annmarie Tyler NP, *   Subject: RE: 6 months to OLEG                               We can schedule him for gen change in 3-4 months   He usually needs his brother's help   ----- Message -----   From: Eulogio Lemos   Sent: 6/14/2022  11:36 AM EDT   To: Dede Corea MD, Vicente Ward RN, *   Subject: 6 months to OLEG                                   Patient has 6 months to OLEG on today's overdue remote check. He has a dual chamber pacemaker and is dependent. Meredith Childs           2:25 PM  Attempted to reach patient by telephone. HIPAA compliant message was left for return call.

## 2022-08-23 ENCOUNTER — TELEPHONE (OUTPATIENT)
Dept: CARDIOLOGY CLINIC | Age: 62
End: 2022-08-23

## 2022-08-23 NOTE — TELEPHONE ENCOUNTER
Patient currently scheduled for generator change on 12/30/22. Dr. Buck Macias will be out of the office this week. Will need to reschedule procedure. Attempted to reach patient by telephone. A message was left for return call.

## 2022-08-23 NOTE — LETTER
8/29/2022 10:31 AM    Mr. Arjun Villafuerte  [address] [facility] [address]      You are scheduled for the following procedure with Dr. More Almanza:  Pacemaker generator change at [facility], [address] [facility], [facility]      PLEASE be aware that your procedure date/time is tentative and subject to change due to emergency cases. Procedure date/time:    January 4, 2023  Time: 10:30 am - please arrive by 8:30 am      ARRIVAL time:  (You will need  to be discharged home with.)     [X]  [facility] procedures: arrive to check in on 1st floor near [address] [facility] entrance two hours prior to your procedure. Pre-procedure Labs/Imaging:    o PRE-PROCEDURE LABS NEEDED: YES   o Forms for labwork may be given at appointment. If not, they will be mailed to you. Labs should be completed within 5-7 days of procedure. See locations below:    Heart and [address]  [address], [address] [facility]  [address] [facility] 7A-5P  [address], [address] [facility], [address] [place] [address]  Monday-Friday [id number]  [phone] (closed 1-2P)    [facility] , [address] [facility],[address]Th Floor [address]  [place], [address]  Monday-Friday 730A-430P (TYWOTH [id number])  [phone]    [address]  [address] [facility], [address] [facility]  Monday-Friday 7A-430P  [address] [facility] [address], [address] [facility]  Monday-Friday 730A-430P (closed 1-2P)  [phone]    [address], [address] [place] [address],4Th Floor  [address]  Monday-Friday 730A-430P (closed [id number])  [phone]        Medication Instructions:    Please HOLD your pradaxa 2 days prior to your procedure.         Hibiclens 4% topical solution has been ordered and sent into your pharmacy  Patient it start Hibiclens application 5 days prior to procedure date    Directions Hibiclens 4%: Start cleanse 5 days prior to procedure   1. Rinse area (upper chest and upper arms) with water. 2. Apply minimum amount necessary to scrub the upper chest area from shoulder/neck to mid line of chest and to below the nipple each of  5 nights before the day of the procedure  3. Let solution dry. Nothing to eat or drink after midnight before your procedure. You may take all other medications as directed the morning of your procedure with a sip of water unless otherwise indicated. Please contact the office [phone] and ask for a member of Dr. Alcantar Search team for procedure questions. There is a physician on call for the office after hours for immediate needs. FOLLOW UP:   Your appointments will be made for you post procedure based off of discharge instructions. You may have driving restrictions for a short time after your procedure (usually 2-3 days). Pacemaker/ICD patients will be unable to have an MRI until 6 weeks after implant, NO dental work for 8 weeks after your implant.        Future Appointments   Date Time Provider Jam Echevarria   9/13/2022 10:40 AM MD MARITZA Odom BS AMB   9/14/2022  1:00 PM REMOTE1, SARMAD NORMAN AMB   12/19/2022  1:45 PM REMOTE1, SARMAD NORMAN AMB   1/13/2023  9:00 AM PACEMAKER3, SARMAD NORMAN AMB   1/13/2023  9:30 AM WOUND CHECKS, SARMAD NORMAN AMB   3/28/2023  1:00 PM PACEMAKER3, SARMAD SALAS BS AMB   3/28/2023  1:20 PM MD MARITZA Fernando BS AMB               Sincerely,      Shant Mae MD

## 2022-08-29 NOTE — TELEPHONE ENCOUNTER
Received call from patient, ID verified using two patient identifiers. Advised patient that his generator change scheduled for 12/30/22 needs to be rescheduled because Dr. Donald Mae will be out of the office. Patient rescheduled for 1/4/22 at 10:30 am.  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 Reviewed pre-procedure instructions with patient and time allowed for questions. Instructions to be mailed today to the address confirmed on file. Patient verbalized understanding and will call with any other questions.

## 2022-09-13 ENCOUNTER — OFFICE VISIT (OUTPATIENT)
Dept: CARDIOLOGY CLINIC | Age: 62
End: 2022-09-13
Payer: COMMERCIAL

## 2022-09-13 VITALS
HEIGHT: 76 IN | OXYGEN SATURATION: 97 % | BODY MASS INDEX: 38.36 KG/M2 | DIASTOLIC BLOOD PRESSURE: 70 MMHG | SYSTOLIC BLOOD PRESSURE: 110 MMHG | WEIGHT: 315 LBS | RESPIRATION RATE: 14 BRPM | HEART RATE: 80 BPM

## 2022-09-13 DIAGNOSIS — I47.1 SVT (SUPRAVENTRICULAR TACHYCARDIA) (HCC): ICD-10-CM

## 2022-09-13 DIAGNOSIS — I48.19 PERSISTENT ATRIAL FIBRILLATION WITH RAPID VENTRICULAR RESPONSE (HCC): Primary | ICD-10-CM

## 2022-09-13 DIAGNOSIS — I49.5 SICK SINUS SYNDROME (HCC): ICD-10-CM

## 2022-09-13 PROCEDURE — 93000 ELECTROCARDIOGRAM COMPLETE: CPT | Performed by: SPECIALIST

## 2022-09-13 PROCEDURE — 99214 OFFICE O/P EST MOD 30 MIN: CPT | Performed by: SPECIALIST

## 2022-09-13 NOTE — PROGRESS NOTES
Visit Vitals  /70 (BP 1 Location: Left arm, BP Patient Position: Sitting, BP Cuff Size: Adult)   Pulse 80   Resp 14   Ht 6' 4\" (1.93 m)   Wt 323 lb (146.5 kg)   SpO2 97%   BMI 39.32 kg/m²

## 2022-09-13 NOTE — PROGRESS NOTES
[facility]                                                            OFFICE NOTE        Karina Wilhelm M.D.,[facility]   1960  [id number]    Date/Time:  9/13/202211:19 AM            SUBJECTIVE:  Doing well   No cp sob at baseline    No palpitations dizziness       Assessment/Plan  1. Atrial fibrillation: this is chronic . Status post atrial fibrillation ablation 2012 in 2017. He has had recurrence of atrial fibrillation and eventually he underwent successful AV node ablation in 2018. Status post permanent pacemaker placement at that time as well. His electrocardiogram today reveals a likely underlying atrial fibrillation ventricular paced rhythm. He remains on Pradaxa with no untoward effects thus far. Shortness of breath reported somewhat inconsistent      Discussed last echo with normal ef and mild mr , no additional interventions for now repeat limited echo at the next OV     3. Hypertension: None and actually blood pressure the lower limits of normal he denies any particular dizziness at this time. 4. Chest pain: not recurrent     5. Permanent pacemaker: Closely followed by Dr. Tomasz Enrique. For generator change on 1/23     Otherwise see him back in 6 months with limited echo or sooner if any issues        HPI     63 y.o. male.h/o ATRIAL FIBRILLATION,  This was discovered by his pcp when patient was seen for wrist injurie.   Nuclear stress test on 2/12 no ischemia or mi and EF of 51%  Echo on 1/12 normal EF no gross RWMA  24 hours holter on 2/12 afib   Dc cardioversion on 5/12 attempted several times with also the addition of corvert with no restoration of NSR  He has undergone PPM and atrial fibrillation ablation with Dr. Dylan Byrne in 8/12      Admitted to [facility] on 11/16 for cp and AF with RVR  Converted with ibutilide and then started on sotalol, also [address] started Nuclear stress test on 11/16:1. No definite evidence of ischemia and/or infarction. 2. LVEF equals 52%. Left ventricular wall motion and thickening is normal.  On 1/17 : ABLATION OF AF     Recurrent PAF on 2018 off on despite increase in medications  Eventually he underwent on 7/18 with Dr. Farah:AV frank ablation and His bundle recording with fluoroscopy. pacemaker was re-interrogated and reprogrammed before and after the ablation. PMH: as above and some djd    PSH: none    FH: not significant for early CAD or SCD   SH: no etoh or tobacco, works as a             CARDIAC STUDIES        03/03/22    ECHO ADULT COMPLETE 03/07/2022 3/7/2022    Interpretation Summary  Formatting of this result is different from the original.      Left Ventricle: Left ventricle size is normal. Mildly increased wall thickness. Normal wall motion. Normal left ventricular systolic function. EF by 2D Simpsons Biplane is 58%. Normal diastolic function. Right Ventricle: Not well visualized. Mitral Valve: Mild transvalvular regurgitation. Left Atrium: Left atrium is mildly dilated. Signed by: Fermin Gardner MD on 3/7/2022  1:06 PM            11/21/16    NM CARDIAC SPECT W STRS/REST MULT 11/23/2016 11/23/2016    Narrative  Indication: Chest pain    A Lexiscan myocardial SPECT gated wall motion study was performed utilizing 32  mCi of Tc MYOVIEW for rest and 33 mCi for stress. SPECT imaging at stress and rest demonstrates no definite evidence of ischemia  and/or infarction. There is slight thinning of the inferior wall most likely attenuation artifact. Left ventricular ejection fraction equals 52%. Left ventricular wall motion and  thickening are within normal limits. Impression  IMPRESSION:  1. No definite evidence of ischemia and/or infarction. 2. LVEF equals 52%.  Left ventricular wall motion and thickening is normal.    Signed by: Xiang Acosta MD on 11/23/2016 10:00 AM                    EKG Results Procedure 720 Value Units Date/Time    AMB POC EKG ROUTINE W/ 12 LEADS, INTER & REP [[id number]] Resulted: 09/13/22 1051    Order Status: Completed Updated: 09/13/22 1053                IMAGING      MRI Results (most recent):  No results found for this or any previous visit. CT Results (most recent):  Results from Hospital Encounter encounter on 12/13/16    CTA CHEST W WO CONT    Narrative  CT chest - pulmonary vein protocol    CPT code: 46392    INDICATION: Atrial fibrillation. Pulmonary vein mapping study. COMPARISON: 7/9/2012    TECHNIQUE: Thin collimation noncontrast and contrast enhanced axial images  obtained through the level of the pulmonary vasculature. Raw data initially  reviewed. There was uneventful administration of 75 mL's of Isovue-370 as  intravenous contrast material. Following this, image reconstruction performed on  a [employer]vantage workstation with 3-D, maximum intensity projection and  volumetric rendering of the pulmonary veins reviewed. CT dose reduction was  achieved through use of a standardized protocol tailored for this examination  and automatic exposure control for dose modulation. COMPARISON: 7912    FINDINGS:  The images of the chest began at about the level of aortic arch and extending to  the lung bases. The entirety of the lungs are not evaluated. The visualized  lungs demonstrate no suspicious nodules. The mediastinal windows demonstrate no  mediastinal or hilar adenopathy. There is a pacemaker in place with one lead in  the right atrium the other in the right ventricle. The right ventricle right  atrium are mildly enlarged as is the main pulmonary artery segment ingesting  pulmonary hypertension. The left atrial appendage is well opacified no evidence for thrombus. .    Right superior pulmonary vein: Venous trunk measures approximately 3.3 x 2.8 cm  diameter, extending 7 mm until  first branching.     Approximately 7 mm between the origins of the right superior and inferior  pulmonary veins. Right inferior pulmonary vein: Venous trunk measures approximately 2.4 x 2.0 cm  diameter, extending 4.0 mm until first branching. Left superior pulmonary vein: Venous trunk measures approximately 1.8 x 1.3 cm  diameter, extending 6.5 mm until first branching. Approximately 6 mm between the origins of the left superior and inferior  pulmonary veins. Left inferior pulmonary vein: Venous trunk measures approximately 2.0 x 1.5 cm,  extending 14.2 mm until first branching. Impression  IMPRESSION:  1. Pulmonary venous anatomy as outlined above. No anomalies are detected. 2.  There is enlargement of the main pulmonary artery segment, right ventricle  and right atrium suggesting elevated right heart pressures and pulmonary  hypertension. .      XR Results (most recent):  Results from Hospital Encounter encounter on 12/12/18    XR CHEST PA LAT    Narrative  EXAM:  XR CHEST PA LAT    INDICATION:  Cough, history of pneumonia. COMPARISON: November 22, 2016    TECHNIQUE: PA and lateral chest views    FINDINGS: Left chest wall dual chamber pacemaker in unchanged position. Heart  size is normal. No pericardial or pleural effusion. The lungs are clear. Osseous  structures are age-appropriate. Impression  IMPRESSION:    No evidence of pneumonia.           Past Medical History:   Diagnosis Date    Atrial fibrillation [facility])     Pacemaker      Past Surgical History:   Procedure Laterality Date    ABLATION, ATRIAL FIBRILLATION   7/25/2012         ECHO INTRACARDIAC  7/25/2012         EP STUDY W/O INDUCTION  7/25/2012         HX PACEMAKER      HX WISDOM TEETH EXTRACTION      40 yrs ago    INS PPM/ICD LED DUAL ONLY  8/17/2012         WI ICAR CATHETER ABLATION ATRIOVENTR NODE FUNCTION  7/16/2018         WI INTRACARDIAC ELECTROPHYSIOLOGIC 3D MAPPING  7/25/2012         WI LEFT HEART CATH BY TRANSEPTAL PUNCTURE  7/25/2012         CHRISTEL ECHO COLOR FLOW  7/25/2012          Social History     Tobacco Use    Smoking status: Never    Smokeless tobacco: Never   Substance Use Topics    Alcohol use: No    Drug use: No     Family History   Problem Relation Age of Onset    Hypertension Mother      Allergies   Allergen Reactions    Silvadene [Silver Sulfadiazine] Swelling         Visit Vitals  /70 (BP 1 Location: Left arm, BP Patient Position: Sitting, BP Cuff Size: Adult)   Pulse 80   Resp 14   Ht 6' 4\" (1.93 m)   Wt 323 lb (146.5 kg)   SpO2 97%   BMI 39.32 kg/m²         Last 3 Recorded Weights in this Encounter    09/13/22 1049   Weight: 323 lb (146.5 kg)            Review of Systems:   Pertinent items are noted in the History of Present Illness. Neck: no JVD  Heart: regularly irregular rhythm  Lungs: clear to auscultation bilaterally  Abdomen: soft, non-tender. Bowel sounds normal. No masses,  no organomegaly  Extremities: extremities normal, atraumatic, no cyanosis or edema      Current Outpatient Medications on File Prior to Visit   Medication Sig Dispense Refill    metoprolol tartrate (LOPRESSOR) 25 mg tablet TAKE 1 TABLET BY MOUTH TWICE A  Tablet 1    chlorhexidine (HIBICLENS) 4 % liquid Apply to the upper chest area from shoulder/neck to mid line of chest and to below the nipple every day, 5 days prior to the procedure. 1 Each 0    dabigatran etexilate (Pradaxa) 150 mg capsule TAKE 1 CAPSULE BY MOUTH EVERY 12 HOURS 180 Capsule 1    acetaminophen (TYLENOL) 500 mg tablet Take 1,500 mg by mouth every six (6) hours as needed for Pain (Daily). No current facility-administered medications on file prior to visit. Kurt Lawton had no medications administered during this visit.      Current Outpatient Medications   Medication Sig    metoprolol tartrate (LOPRESSOR) 25 mg tablet TAKE 1 TABLET BY MOUTH TWICE A DAY    chlorhexidine (HIBICLENS) 4 % liquid Apply to the upper chest area from shoulder/neck to mid line of chest and to below the nipple every day, 5 days prior to the procedure. dabigatran etexilate (Pradaxa) 150 mg capsule TAKE 1 CAPSULE BY MOUTH EVERY 12 HOURS    acetaminophen (TYLENOL) 500 mg tablet Take 1,500 mg by mouth every six (6) hours as needed for Pain (Daily). No current facility-administered medications for this visit. Lab Results   Component Value Date/Time    Cholesterol, total 141 10/28/2021 11:20 AM    HDL Cholesterol 45 10/28/2021 11:20 AM    LDL, calculated 76 10/28/2021 11:20 AM    LDL, calculated 47.2 11/22/2016 04:33 AM    VLDL, calculated 20 10/28/2021 11:20 AM    VLDL, calculated 11.8 11/22/2016 04:33 AM    Triglyceride 111 10/28/2021 11:20 AM    CHOL/HDL Ratio 2.4 11/22/2016 04:33 AM       Lab Results   Component Value Date/Time    Sodium 143 10/28/2021 11:20 AM    Potassium 5.2 10/28/2021 11:20 AM    Chloride 102 10/28/2021 11:20 AM    CO2 26 10/28/2021 11:20 AM    Anion gap 8 07/16/2018 07:53 AM    Glucose 101 (H) 10/28/2021 11:20 AM    BUN 12 10/28/2021 11:20 AM    Creatinine 0.90 10/28/2021 11:20 AM    BUN/Creatinine ratio 13 10/28/2021 11:20 AM    GFR est  10/28/2021 11:20 AM    GFR est non-AA 93 10/28/2021 11:20 AM    Calcium 9.4 10/28/2021 11:20 AM       Lab Results   Component Value Date/Time    ALT (SGPT) 23 10/28/2021 11:20 AM    Alk.  phosphatase 85 10/28/2021 11:20 AM    Bilirubin, total 0.4 10/28/2021 11:20 AM       Lab Results   Component Value Date/Time    WBC 6.5 10/28/2021 11:20 AM    HGB 15.6 10/28/2021 11:20 AM    HCT 46.5 10/28/2021 11:20 AM    PLATELET 736 58/21/3055 11:20 AM    MCV 97 10/28/2021 11:20 AM       Lab Results   Component Value Date/Time    TSH 1.810 10/28/2021 11:20 AM         Lab Results   Component Value Date/Time    Cholesterol, total 141 10/28/2021 11:20 AM    Cholesterol, total 100 11/22/2016 04:33 AM    HDL Cholesterol 45 10/28/2021 11:20 AM    HDL Cholesterol 41 11/22/2016 04:33 AM    LDL, calculated 76 10/28/2021 11:20 AM    LDL, calculated 47.2 11/22/2016 04:33 AM    Triglyceride 111 10/28/2021 11:20 AM    Triglyceride 59 11/22/2016 04:33 AM    CHOL/HDL Ratio 2.4 11/22/2016 04:33 AM                Please note that this dictation was completed with [employer], the computer voice recognition software. Quite often unanticipated grammatical, syntax, homophones, and other interpretative errors are inadvertently transcribed by the computer software. Please disregard these errors. Please excuse any errors that have escaped final proofreading.

## 2022-09-14 ENCOUNTER — OFFICE VISIT (OUTPATIENT)
Dept: CARDIOLOGY CLINIC | Age: 62
End: 2022-09-14
Payer: COMMERCIAL

## 2022-09-14 DIAGNOSIS — Z95.0 CARDIAC PACEMAKER IN SITU: Primary | ICD-10-CM

## 2022-09-14 PROCEDURE — 93296 REM INTERROG EVL PM/IDS: CPT | Performed by: INTERNAL MEDICINE

## 2022-09-14 PROCEDURE — 93294 REM INTERROG EVL PM/LDLS PM: CPT | Performed by: INTERNAL MEDICINE

## 2022-09-14 NOTE — LETTER
10/12/2022 1:15 PM    Mr. Nitin Huang  [address] [facility] [address]      Dear Mr. Nitin Huang,    We have received your recent remote monitor check of your implanted device on 9/14/22. Your remaining estimated battery life is 8 months and your device is working normally & appropriately. If you have difficulty sending a transmission, please do NOT call our office. Instead, call tech support for your device as they are better able to assist.    [employer] ([place])   [phone]    If you have any questions, please call the Pacemaker/ICD clinic that follows you. We appreciate you staying remotely connected!     Sincerely,    Mary Jane Brock RN, BSN  Device Coordinator RN  [facility]  [phone]  [place]  [phone]

## 2022-10-12 NOTE — PROGRESS NOTES
Chargeable pacer remote      Normal function of pacer with known afib, 29.9% burden/takes Pradaxa. See scanned report in  for details.

## 2022-11-15 RX ORDER — DABIGATRAN ETEXILATE 150 MG/1
CAPSULE ORAL
Qty: 180 CAPSULE | Refills: 1 | Status: SHIPPED | OUTPATIENT
Start: 2022-11-15

## 2022-11-15 NOTE — TELEPHONE ENCOUNTER
Per VO of MD    LOV: Visit date not found    Future Appointments   Date Time Provider Jam Swathi   12/19/2022  1:45 PM REMOTE1, SARMAD NORMAN AMB   1/13/2023  9:00 AM PACEMAKER3, SARMAD NORMAN AMB   1/13/2023  9:30 AM WOUND CHECKS, SARMAD NORMAN AMB   3/14/2023 10:00 AM ECHO, SARMAD NORMAN AMB   3/14/2023 11:20 AM Jean Carlos Castañeda MD [facility] AMB   3/28/2023  1:00 PM PACEMAKER3, SARMAD NORMAN AMB   3/28/2023  1:20 PM Marlena Goodpasture, MD [facility] AMB       Requested Prescriptions     Signed Prescriptions Disp Refills    dabigatran etexilate (Pradaxa) 150 mg capsule 180 Capsule 1     Sig: TAKE 1 CAPSULE BY MOUTH EVERY 12 HOURS     Authorizing Provider: Bethany Sosa     Ordering User: Coral James

## 2022-12-11 ENCOUNTER — APPOINTMENT (OUTPATIENT)
Dept: GENERAL RADIOLOGY | Age: 62
End: 2022-12-11
Attending: EMERGENCY MEDICINE
Payer: COMMERCIAL

## 2022-12-11 ENCOUNTER — APPOINTMENT (OUTPATIENT)
Dept: CT IMAGING | Age: 62
End: 2022-12-11
Attending: EMERGENCY MEDICINE
Payer: COMMERCIAL

## 2022-12-11 ENCOUNTER — HOSPITAL ENCOUNTER (EMERGENCY)
Age: 62
Discharge: HOME | End: 2022-12-11
Attending: EMERGENCY MEDICINE
Payer: COMMERCIAL

## 2022-12-11 VITALS
SYSTOLIC BLOOD PRESSURE: 140 MMHG | OXYGEN SATURATION: 97 % | RESPIRATION RATE: 14 BRPM | DIASTOLIC BLOOD PRESSURE: 84 MMHG | HEART RATE: 65 BPM | TEMPERATURE: 97.7 F

## 2022-12-11 DIAGNOSIS — R06.02 SOB (SHORTNESS OF BREATH): Primary | ICD-10-CM

## 2022-12-11 LAB
ALBUMIN SERPL-MCNC: 3.4 G/DL (ref 3.5–5)
ALBUMIN/GLOB SERPL: 0.9 {RATIO} (ref 1.1–2.2)
ALP SERPL-CCNC: 83 U/L (ref 45–117)
ALT SERPL-CCNC: 57 U/L (ref 12–78)
ANION GAP SERPL CALC-SCNC: 4 MMOL/L (ref 5–15)
AST SERPL-CCNC: 32 U/L (ref 15–37)
BASOPHILS # BLD: 0 K/UL (ref 0–0.1)
BASOPHILS NFR BLD: 1 % (ref 0–1)
BILIRUB SERPL-MCNC: 0.5 MG/DL (ref 0.2–1)
BNP SERPL-MCNC: 452 PG/ML
BUN SERPL-MCNC: 17 MG/DL (ref 6–20)
BUN/CREAT SERPL: 17 (ref 12–20)
CALCIUM SERPL-MCNC: 8.6 MG/DL (ref 8.5–10.1)
CHLORIDE SERPL-SCNC: 106 MMOL/L (ref 97–108)
CO2 SERPL-SCNC: 27 MMOL/L (ref 21–32)
COMMENT, HOLDF: NORMAL
COMMENT, HOLDF: NORMAL
CREAT SERPL-MCNC: 0.99 MG/DL (ref 0.7–1.3)
DIFFERENTIAL METHOD BLD: NORMAL
EOSINOPHIL # BLD: 0 K/UL (ref 0–0.4)
EOSINOPHIL NFR BLD: 1 % (ref 0–7)
ERYTHROCYTE [DISTWIDTH] IN BLOOD BY AUTOMATED COUNT: 12.9 % (ref 11.5–14.5)
GLOBULIN SER CALC-MCNC: 3.6 G/DL (ref 2–4)
GLUCOSE SERPL-MCNC: 144 MG/DL (ref 65–100)
HCT VFR BLD AUTO: 43.5 % (ref 36.6–50.3)
HGB BLD-MCNC: 14.1 G/DL (ref 12.1–17)
IMM GRANULOCYTES # BLD AUTO: 0 K/UL (ref 0–0.04)
IMM GRANULOCYTES NFR BLD AUTO: 0 % (ref 0–0.5)
LYMPHOCYTES # BLD: 1.6 K/UL (ref 0.8–3.5)
LYMPHOCYTES NFR BLD: 24 % (ref 12–49)
MCH RBC QN AUTO: 32 PG (ref 26–34)
MCHC RBC AUTO-ENTMCNC: 32.4 G/DL (ref 30–36.5)
MCV RBC AUTO: 98.6 FL (ref 80–99)
MONOCYTES # BLD: 0.4 K/UL (ref 0–1)
MONOCYTES NFR BLD: 6 % (ref 5–13)
NEUTS SEG # BLD: 4.4 K/UL (ref 1.8–8)
NEUTS SEG NFR BLD: 68 % (ref 32–75)
NRBC # BLD: 0 K/UL (ref 0–0.01)
NRBC BLD-RTO: 0 PER 100 WBC
PLATELET # BLD AUTO: 194 K/UL (ref 150–400)
PMV BLD AUTO: 9 FL (ref 8.9–12.9)
POTASSIUM SERPL-SCNC: 4.2 MMOL/L (ref 3.5–5.1)
PROT SERPL-MCNC: 7 G/DL (ref 6.4–8.2)
RBC # BLD AUTO: 4.41 M/UL (ref 4.1–5.7)
SAMPLES BEING HELD,HOLD: NORMAL
SAMPLES BEING HELD,HOLD: NORMAL
SODIUM SERPL-SCNC: 137 MMOL/L (ref 136–145)
TROPONIN-HIGH SENSITIVITY: 12 NG/L (ref 0–76)
TROPONIN-HIGH SENSITIVITY: 13 NG/L (ref 0–76)
UR CULT HOLD, URHOLD: NORMAL
WBC # BLD AUTO: 6.5 K/UL (ref 4.1–11.1)

## 2022-12-11 PROCEDURE — 84484 ASSAY OF TROPONIN QUANT: CPT

## 2022-12-11 PROCEDURE — 74011000636 HC RX REV CODE- 636: Performed by: EMERGENCY MEDICINE

## 2022-12-11 PROCEDURE — 85025 COMPLETE CBC W/AUTO DIFF WBC: CPT

## 2022-12-11 PROCEDURE — 83880 ASSAY OF NATRIURETIC PEPTIDE: CPT

## 2022-12-11 PROCEDURE — 71046 X-RAY EXAM CHEST 2 VIEWS: CPT

## 2022-12-11 PROCEDURE — 36415 COLL VENOUS BLD VENIPUNCTURE: CPT

## 2022-12-11 PROCEDURE — 80053 COMPREHEN METABOLIC PANEL: CPT

## 2022-12-11 PROCEDURE — 71260 CT THORAX DX C+: CPT

## 2022-12-11 PROCEDURE — 99285 EMERGENCY DEPT VISIT HI MDM: CPT

## 2022-12-11 RX ADMIN — IOPAMIDOL 100 ML: 612 INJECTION, SOLUTION INTRAVENOUS at 15:38

## 2022-12-11 NOTE — ED TRIAGE NOTES
Pt arrives from home for SOB. Increased WOB in triage. [facility] saw pt last night and heard diminished breath sounds, suggested he come home. Denies CHF, pt has pacemaker.

## 2022-12-11 NOTE — ED PROVIDER NOTES
82-year-old male with a history of atrial fibrillation, AV frank ablation with pacemaker presents with chief complaint of shortness of breath. Patient's significant other reports that he is been short of breath for several months. The patient reports that his symptoms seem to have worsened over the last 3 days. His dyspnea as with ambulation. He denies chest pain, fever, abdominal pain, GI or urinary symptoms. He does endorse mild intermittent cough.        Past Medical History:   Diagnosis Date    Atrial fibrillation [facility])     Pacemaker        Past Surgical History:   Procedure Laterality Date    ABLATION, ATRIAL FIBRILLATION   7/25/2012         ECHO INTRACARDIAC  7/25/2012         EP STUDY W/O INDUCTION  7/25/2012         HX PACEMAKER      HX WISDOM TEETH EXTRACTION      40 yrs ago    INS PPM/ICD LED DUAL ONLY  8/17/2012         NM ICAR CATHETER ABLATION ATRIOVENTR NODE FUNCTION  7/16/2018         NM INTRACARDIAC ELECTROPHYSIOLOGIC 3D MAPPING  7/25/2012         NM LEFT HEART CATH BY TRANSEPTAL PUNCTURE  7/25/2012         CHIRSTEL ECHO COLOR FLOW  7/25/2012              Family History:   Problem Relation Age of Onset    Hypertension Mother        Social History     Socioeconomic History    Marital status: SINGLE     Spouse name: Not on file    Number of children: Not on file    Years of education: Not on file    Highest education level: Not on file   Occupational History    Not on file   Tobacco Use    Smoking status: Never    Smokeless tobacco: Never   Substance and Sexual Activity    Alcohol use: No    Drug use: No    Sexual activity: Not on file   Other Topics Concern    Not on file   Social History Narrative    Not on file     Social Determinants of Health     Financial Resource Strain: Not on file   Food Insecurity: Not on file   Transportation Needs: Not on file   Physical Activity: Not on file   Stress: Not on file   Social Connections: Not on file   Intimate Partner Violence: Not on file   Housing Stability: Not on file         ALLERGIES: Silvadene [silver sulfadiazine]    Review of Systems   Constitutional:  Negative for fever. HENT:  Negative for rhinorrhea. Respiratory:  Positive for cough and shortness of breath. Cardiovascular:  Negative for chest pain. Gastrointestinal:  Negative for abdominal pain. Genitourinary:  Negative for dysuria. Musculoskeletal:  Negative for back pain. Skin:  Negative for wound. Neurological:  Negative for headaches. Psychiatric/Behavioral:  Negative for confusion. Vitals:    12/11/22 1228 12/11/22 1442   BP: 134/75    Pulse: 65    Resp: 15    Temp: 97.7 °F (36.5 °C)    SpO2: 98% 97%            Physical Exam  Vitals and nursing note reviewed. Constitutional:       General: He is not in acute distress. Appearance: Normal appearance. He is not ill-appearing, toxic-appearing or diaphoretic. HENT:      Head: Normocephalic. Eyes:      Extraocular Movements: Extraocular movements intact. Cardiovascular:      Rate and Rhythm: Normal rate. Pulses: Normal pulses. Pulmonary:      Effort: Pulmonary effort is normal. No respiratory distress. Breath sounds: Examination of the right-lower field reveals decreased breath sounds. Examination of the left-lower field reveals decreased breath sounds. Decreased breath sounds present. Abdominal:      General: There is no distension. Musculoskeletal:         General: Normal range of motion. Cervical back: Normal range of motion. Skin:     General: Skin is dry. Capillary Refill: Capillary refill takes less than 2 seconds. Neurological:      Mental Status: He is alert and oriented to person, place, and time. Psychiatric:         Mood and Affect: Mood normal.        MDM  Number of Diagnoses or Management Options  SOB (shortness of breath)  Diagnosis management comments:   Patient presents with shortness of breath.   Differentials include but not limited to pneumonia, pneumothorax, PE less likely given patient's anticoagulated state, stable angina. Troponin x2 normal.  EKG nonischemic. Chest x-ray shows no pneumonia. CT of the chest with contrast obtained to evaluate for mass or infiltrate. CT negative. Cardiology consulted. They recommended outpatient follow-up. Discussed my clinical impression(s), any labs and/or radiology results with the patient. I answered any questions and addressed any concerns. Discussed the importance of following up with their primary care physician and/or specialist(s). Discussed signs or symptoms that would warrant return back to the ER for further evaluation. The patient is agreeable with discharge. ED Course as of 12/11/22 1449   Sun Dec 11, 2022   1250 EKG shows a paced rhythm at a rate of 65, normal intervals, left axis deviation, no ischemic changes.  [RD]      ED Course User Index  [RD] Dennis Mendoza MD       Procedures

## 2022-12-12 NOTE — PROGRESS NOTES
[address] [facility]                                                            OFFICE NOTE        Fani Herrera M.D.,[facility]   1960  [id number]    Date/Time:  12/12/20224:37 PM            SUBJECTIVE:  In the last week he has noticed significant shortness of breath with minimal activities which is very unusual for him. He is having some very vague right-sided chest discomfort not necessarily associated with activities. Assessment/Plan    1. Shortness of breath: Love Walsh is usually not symptomatic at all and doing very well this is very unusual for him to have this type of shortness of breath that is limiting his activities. He has gone to the emergency room where the CT chest showed no pulmonary embolism and cardiomegaly. Given the presence of pacemaker and again the presence of cardiomegaly we need to ascertain ejection fraction soon as possible. Proceed with echocardiogram.    He is EKG today reveals underlying atrial fibrillation ventricular paced rhythm therefore not helpful in detection of ischemic changes. I suspect really not a acute ischemic event but we will proceed nonetheless with Darletta Scot stress test.    His blood pressure is too low at this time for initiation of diuretic therapy he will continue metoprolol for now. There is a consideration eval albuterol and this will be provided to the patient today to see if there is any improvement in symptoms. 2. Atrial fibrillation: this is chronic. Status post atrial fibrillation ablation 2012 in 2017. He has had recurrence of atrial fibrillation and eventually he underwent successful AV node ablation in 2018. Status post permanent pacemaker placement at that time as well. His electrocardiogram today reveals a likely underlying atrial fibrillation ventricular paced rhythm.      He remains on Pradaxa with no untoward effects thus far. Discussed last echo with normal ef and mild mr , no additional interventions for now repeat limited echo at the next OV     3. Hypertension: None and actually blood pressure the lower limits of normal he denies any particular dizziness at this time. 4. Chest pain:atypica and typical reported nuclear stress test     5. Permanent pacemaker: Closely followed by Dr. Darin Sheth. For generator change on 1/23    6. SOB: minimal elevation in probnp recently, normal troponin , no PE on CTA and cardiomegaly proceeding with echo and nuclear stress test    Otherwise see him back after tests           HPI     63 y.o. male.h/o ATRIAL FIBRILLATION,  This was discovered by his pcp when patient was seen for wrist injurie. Nuclear stress test on 2/12 no ischemia or mi and EF of 51%  Echo on 1/12 normal EF no gross RWMA  24 hours holter on 2/12 afib   Dc cardioversion on 5/12 attempted several times with also the addition of corvert with no restoration of NSR  He has undergone PPM and atrial fibrillation ablation with Dr. Flaquita Alberto in 8/12      Admitted to [facility] on 11/16 for cp and AF with RVR  Converted with ibutilide and then started on sotalol, also [address] [place] [address] Road started      Nuclear stress test on 11/16:1. No definite evidence of ischemia and/or infarction. 2. LVEF equals 52%. Left ventricular wall motion and thickening is normal.  On 1/17 : ABLATION OF AF     Recurrent PAF on 2018 off on despite increase in medications  Eventually he underwent on 7/18 with Dr. Farah:AV frank ablation and His bundle recording with fluoroscopy. pacemaker was re-interrogated and reprogrammed before and after the ablation.     Seen in er on 12/22 for sob CTA with no PE , troponin normal and only mild elevation of probnp noted     PMH: as above and some djd    PSH: none    FH: not significant for early CAD or SCD   SH: no etoh or tobacco, works as a             CARDIAC STUDIES        03/03/22    ECHO ADULT COMPLETE 03/07/2022 3/7/2022    Interpretation Summary    Left Ventricle: Left ventricle size is normal. Mildly increased wall thickness. Normal wall motion. Normal left ventricular systolic function. EF by 2D Simpsons Biplane is 58%. Normal diastolic function. Right Ventricle: Not well visualized. Mitral Valve: Mild transvalvular regurgitation. Left Atrium: Left atrium is mildly dilated. Signed by: Richie Dyson MD on 3/7/2022  1:06 PM            11/21/16    NM CARDIAC SPECT W STRS/REST MULT 11/23/2016 11/23/2016    Narrative  Indication: Chest pain    A Lexiscan myocardial SPECT gated wall motion study was performed utilizing 32  mCi of Tc MYOVIEW for rest and 33 mCi for stress. SPECT imaging at stress and rest demonstrates no definite evidence of ischemia  and/or infarction. There is slight thinning of the inferior wall most likely attenuation artifact. Left ventricular ejection fraction equals 52%. Left ventricular wall motion and  thickening are within normal limits. Impression  IMPRESSION:  1. No definite evidence of ischemia and/or infarction. 2. LVEF equals 52%. Left ventricular wall motion and thickening is normal.    Signed by: Daysi Gómez MD on 11/23/2016 10:00 AM                    EKG Results       Procedure 720 Value Units Date/Time    AMB POC EKG ROUTINE W/ 12 LEADS, INTER & REP [[id number]]     Order Status: No result                 IMAGING      MRI Results (most recent):  No results found for this or any previous visit. CT Results (most recent):  Results from Hospital Encounter encounter on 12/11/22    CT CHEST W CONT    Narrative  EXAM:  CT CHEST W CONT    INDICATION:  27-year-old male with shortness of breath. COMPARISON: 12/11/2022 chest radiographs and 12/13/2016 cardiac CT. CONTRAST:  100 cc of Isovue-300.     TECHNIQUE: Multislice helical CT was performed from the thoracic inlet to the  adrenal glands after the uneventful administration of intravenous contrast.  Contiguous 5 mm axial images were reconstructed and lung and soft tissue windows  were generated. Coronal and sagittal reformations were generated. CT dose  reduction was achieved through use of a standardized protocol tailored for this  examination and automatic exposure control for dose modulation. FINDINGS:  Lower neck: No thyroid nodule. Soft tissue within normal limits. Lungs/Pleura: No evidence of consolidation, pleural effusion, or pneumothorax. No suspicious pulmonary nodules. Subsegmental scarring/atelectasis in the left  lower lobe with elevated left hemidiaphragm, similar to prior study. Mediastinum: Bilateral atrial enlargement. No pericardial effusion. Left chest  wall implantable cardiac pacemaker leads extending to right atrium and right  ventricle. The main pulmonary artery and thoracic aorta caliber as are within  normal limits. No central pulmonary embolism. Bovine arch aortic branching  pattern, developmental variant. The esophagus is grossly within normal limits. No pathologic mediastinal or hilar adenopathy. Chest wall/Soft Tissue: No mass lesion or pathologic axillary lymphadenopathy. Partially imaged left chest wall implantable cardiac pacemaker battery pack  associated metallic artifact preclude optimal evaluation of adjacent structures. Upper Abdomen: No suspicious mass lesion. Multiple left kidney cysts. Bones: No evidence of acute fracture, dislocation, or aggressive osseous  abnormality. There is mild levoscoliosis of the thoracic spine. Impression  No acute cardiopulmonary abnormality. Cardiomegaly with implantable cardiac pacemaker. XR Results (most recent):  Results from Hospital Encounter encounter on 12/11/22    XR CHEST PA LAT    Narrative  Indication:  dyspnea    Exam: PA and lateral views of the chest.    Direct comparison is made to prior CXR dated December 2018. Findings: Cardiomediastinal silhouette is within normal limits.  Intact pacer  leads and right atrium and right ventricle. Lungs are clear bilaterally. Pleural  spaces are normal. Osseous structures are intact. Impression  No acute cardiopulmonary disease. Past Medical History:   Diagnosis Date    Atrial fibrillation [facility])     Pacemaker      Past Surgical History:   Procedure Laterality Date    ABLATION, ATRIAL FIBRILLATION   7/25/2012         ECHO INTRACARDIAC  7/25/2012         EP STUDY W/O INDUCTION  7/25/2012         HX PACEMAKER      HX WISDOM TEETH EXTRACTION      40 yrs ago    INS PPM/ICD LED DUAL ONLY  8/17/2012         AZ ICAR CATHETER ABLATION ATRIOVENTR NODE FUNCTION  7/16/2018         AZ INTRACARDIAC ELECTROPHYSIOLOGIC 3D MAPPING  7/25/2012         AZ LEFT HEART CATH BY TRANSEPTAL PUNCTURE  7/25/2012         CHRISTEL ECHO COLOR FLOW  7/25/2012          Social History     Tobacco Use    Smoking status: Never    Smokeless tobacco: Never   Substance Use Topics    Alcohol use: No    Drug use: No     Family History   Problem Relation Age of Onset    Hypertension Mother      Allergies   Allergen Reactions    Silvadene [Silver Sulfadiazine] Swelling         There were no vitals taken for this visit. There were no vitals filed for this visit. Review of Systems:   Pertinent items are noted in the History of Present Illness. Visit Vitals  BP 98/64 (BP 1 Location: Left upper arm, BP Patient Position: Sitting, BP Cuff Size: Large adult)   Pulse 65   Resp 18   Ht 6' 4\" (1.93 m)   Wt 328 lb 12.8 oz (149.1 kg)   SpO2 95%   BMI 40.02 kg/m²     General Appearance:  Well developed, well nourished,alert and oriented x 3, and individual in no acute distress. Ears/Nose/Mouth/Throat:   Hearing grossly normal.         Neck: Supple. Chest:   Lungs clear to auscultation bilaterally. Cardiovascular:  Regular rate and rhythm, S1, S2 normal, no murmur. Abdomen:   Soft, non-tender, bowel sounds are active. Extremities: No edema bilaterally. Skin: Warm and dry. Current Outpatient Medications on File Prior to Visit   Medication Sig Dispense Refill    dabigatran etexilate (Pradaxa) 150 mg capsule TAKE 1 CAPSULE BY MOUTH EVERY 12 HOURS 180 Capsule 1    metoprolol tartrate (LOPRESSOR) 25 mg tablet TAKE 1 TABLET BY MOUTH TWICE A  Tablet 1    chlorhexidine (HIBICLENS) 4 % liquid Apply to the upper chest area from shoulder/neck to mid line of chest and to below the nipple every day, 5 days prior to the procedure. 1 Each 0    acetaminophen (TYLENOL) 500 mg tablet Take 1,500 mg by mouth every six (6) hours as needed for Pain (Daily). No current facility-administered medications on file prior to visit. [facility] administered during this visit. Current Outpatient Medications   Medication Sig    dabigatran etexilate (Pradaxa) 150 mg capsule TAKE 1 CAPSULE BY MOUTH EVERY 12 HOURS    metoprolol tartrate (LOPRESSOR) 25 mg tablet TAKE 1 TABLET BY MOUTH TWICE A DAY    chlorhexidine (HIBICLENS) 4 % liquid Apply to the upper chest area from shoulder/neck to mid line of chest and to below the nipple every day, 5 days prior to the procedure. acetaminophen (TYLENOL) 500 mg tablet Take 1,500 mg by mouth every six (6) hours as needed for Pain (Daily). No current facility-administered medications for this visit.          Lab Results   Component Value Date/Time    Cholesterol, total 141 10/28/2021 11:20 AM    HDL Cholesterol 45 10/28/2021 11:20 AM    LDL, calculated 76 10/28/2021 11:20 AM    LDL, calculated 47.2 11/22/2016 04:33 AM    VLDL, calculated 20 10/28/2021 11:20 AM    VLDL, calculated 11.8 11/22/2016 04:33 AM    Triglyceride 111 10/28/2021 11:20 AM    CHOL/HDL Ratio 2.4 11/22/2016 04:33 AM       Lab Results   Component Value Date/Time    Sodium 137 12/11/2022 12:44 PM    Potassium 4.2 12/11/2022 12:44 PM    Chloride 106 12/11/2022 12:44 PM    CO2 27 12/11/2022 12:44 PM    Anion gap 4 (L) 12/11/2022 12:44 PM    Glucose 144 (H) 12/11/2022 12:44 PM    BUN 17 12/11/2022 12:44 PM    Creatinine 0.99 12/11/2022 12:44 PM    BUN/Creatinine ratio 17 12/11/2022 12:44 PM    GFR est  10/28/2021 11:20 AM    GFR est non-AA 93 10/28/2021 11:20 AM    Calcium 8.6 12/11/2022 12:44 PM       Lab Results   Component Value Date/Time    ALT (SGPT) 57 12/11/2022 12:44 PM    Alk. phosphatase 83 12/11/2022 12:44 PM    Bilirubin, total 0.5 12/11/2022 12:44 PM       Lab Results   Component Value Date/Time    WBC 6.5 12/11/2022 12:44 PM    HGB 14.1 12/11/2022 12:44 PM    HCT 43.5 12/11/2022 12:44 PM    PLATELET 819 86/49/8130 12:44 PM    MCV 98.6 12/11/2022 12:44 PM       Lab Results   Component Value Date/Time    TSH 1.810 10/28/2021 11:20 AM         Lab Results   Component Value Date/Time    Cholesterol, total 141 10/28/2021 11:20 AM    Cholesterol, total 100 11/22/2016 04:33 AM    HDL Cholesterol 45 10/28/2021 11:20 AM    HDL Cholesterol 41 11/22/2016 04:33 AM    LDL, calculated 76 10/28/2021 11:20 AM    LDL, calculated 47.2 11/22/2016 04:33 AM    Triglyceride 111 10/28/2021 11:20 AM    Triglyceride 59 11/22/2016 04:33 AM    CHOL/HDL Ratio 2.4 11/22/2016 04:33 AM                Please note that this dictation was completed with [employer], the [employer] voice recognition software. Quite often unanticipated grammatical, syntax, homophones, and other interpretative errors are inadvertently transcribed by the computer software. Please disregard these errors. Please excuse any errors that have escaped final proofreading.

## 2022-12-13 ENCOUNTER — OFFICE VISIT (OUTPATIENT)
Dept: CARDIOLOGY CLINIC | Age: 62
End: 2022-12-13
Payer: COMMERCIAL

## 2022-12-13 VITALS
BODY MASS INDEX: 38.36 KG/M2 | SYSTOLIC BLOOD PRESSURE: 98 MMHG | DIASTOLIC BLOOD PRESSURE: 64 MMHG | RESPIRATION RATE: 18 BRPM | WEIGHT: 315 LBS | HEIGHT: 76 IN | OXYGEN SATURATION: 95 % | HEART RATE: 65 BPM

## 2022-12-13 DIAGNOSIS — R06.02 SHORTNESS OF BREATH: ICD-10-CM

## 2022-12-13 DIAGNOSIS — I48.19 PERSISTENT ATRIAL FIBRILLATION WITH RAPID VENTRICULAR RESPONSE (HCC): Primary | ICD-10-CM

## 2022-12-13 PROCEDURE — 99214 OFFICE O/P EST MOD 30 MIN: CPT | Performed by: SPECIALIST

## 2022-12-13 PROCEDURE — 93000 ELECTROCARDIOGRAM COMPLETE: CPT | Performed by: SPECIALIST

## 2022-12-13 NOTE — PROGRESS NOTES
Visit Vitals  BP 98/64 (BP 1 Location: Left upper arm, BP Patient Position: Sitting, BP Cuff Size: Large adult)   Pulse 65   Resp 18   Ht 6' 4\" (1.93 m)   Wt 328 lb 12.8 oz (149.1 kg)   SpO2 95%   BMI 40.02 kg/m²

## 2022-12-14 ENCOUNTER — TELEPHONE (OUTPATIENT)
Dept: CARDIOLOGY CLINIC | Age: 62
End: 2022-12-14

## 2022-12-14 ENCOUNTER — DOCUMENTATION ONLY (OUTPATIENT)
Dept: CARDIOLOGY CLINIC | Age: 62
End: 2022-12-14

## 2022-12-14 ENCOUNTER — ANCILLARY PROCEDURE (OUTPATIENT)
Dept: CARDIOLOGY CLINIC | Age: 62
End: 2022-12-14
Payer: COMMERCIAL

## 2022-12-14 ENCOUNTER — TELEPHONE (OUTPATIENT)
Dept: SLEEP MEDICINE | Age: 62
End: 2022-12-14

## 2022-12-14 VITALS — BODY MASS INDEX: 38.36 KG/M2 | HEIGHT: 76 IN | WEIGHT: 315 LBS

## 2022-12-14 DIAGNOSIS — R06.02 SHORTNESS OF BREATH: ICD-10-CM

## 2022-12-14 DIAGNOSIS — R06.02 SHORTNESS OF BREATH: Primary | ICD-10-CM

## 2022-12-14 DIAGNOSIS — I48.19 PERSISTENT ATRIAL FIBRILLATION WITH RAPID VENTRICULAR RESPONSE (HCC): ICD-10-CM

## 2022-12-14 PROCEDURE — 93306 TTE W/DOPPLER COMPLETE: CPT | Performed by: SPECIALIST

## 2022-12-14 RX ORDER — METOPROLOL TARTRATE 25 MG/1
12.5 TABLET, FILM COATED ORAL 2 TIMES DAILY
Qty: 90 TABLET | Refills: 1 | Status: SHIPPED | OUTPATIENT
Start: 2022-12-14

## 2022-12-14 NOTE — TELEPHONE ENCOUNTER
Verified patient with two types of identifiers. Discussed reducing metoprolol to 12.5mg twice a day and calling Dr. Ramiro Adorno for a sleep study per Dr. Germaine Houston. Patient verbalized understanding and will call with any other questions.

## 2022-12-14 NOTE — TELEPHONE ENCOUNTER
Patient is calling to see if it's okay with the doctor that he is schedule to have a sleep apnea test on 1/19/23 at [facility].     [phone]

## 2022-12-14 NOTE — PROGRESS NOTES
Edgar Rahman is here today for his echocardiogram.    Preliminary report of the echocardiogram shows a low normal ejection fraction of 50 to 55% mild dilatation of the right chambers. He tells me that his shortness of breath is improved quite a bit. Some dizziness is reported at this time. Blood pressure was quite low. I will decrease metoprolol to 12.5 mg twice daily. He gives me a history of snoring. Proceed with sleep study.

## 2022-12-15 LAB
ECHO AO ASC DIAM: 3.2 CM
ECHO AO ASCENDING AORTA INDEX: 1.17 CM/M2
ECHO AO ROOT DIAM: 3.5 CM
ECHO AO ROOT INDEX: 1.28 CM/M2
ECHO AV AREA PEAK VELOCITY: 3.2 CM2
ECHO AV AREA VTI: 3.6 CM2
ECHO AV AREA/BSA PEAK VELOCITY: 1.2 CM2/M2
ECHO AV AREA/BSA VTI: 1.3 CM2/M2
ECHO AV MEAN GRADIENT: 2 MMHG
ECHO AV MEAN VELOCITY: 0.8 M/S
ECHO AV PEAK GRADIENT: 4 MMHG
ECHO AV PEAK VELOCITY: 1 M/S
ECHO AV VELOCITY RATIO: 0.9
ECHO AV VTI: 18.7 CM
ECHO EST RA PRESSURE: 8 MMHG
ECHO LA DIAMETER INDEX: 1.83 CM/M2
ECHO LA DIAMETER: 5 CM
ECHO LA TO AORTIC ROOT RATIO: 1.43
ECHO LA VOL 2C: 108 ML (ref 18–58)
ECHO LA VOL 4C: 59 ML (ref 18–58)
ECHO LA VOL BP: 82 ML (ref 18–58)
ECHO LA VOL/BSA BIPLANE: 30 ML/M2 (ref 16–34)
ECHO LA VOLUME AREA LENGTH: 89 ML
ECHO LA VOLUME INDEX A2C: 40 ML/M2 (ref 16–34)
ECHO LA VOLUME INDEX A4C: 22 ML/M2 (ref 16–34)
ECHO LA VOLUME INDEX AREA LENGTH: 33 ML/M2 (ref 16–34)
ECHO LV EDV A2C: 106 ML
ECHO LV EDV A4C: 87 ML
ECHO LV EDV BP: 99 ML (ref 67–155)
ECHO LV EDV INDEX A4C: 32 ML/M2
ECHO LV EDV INDEX BP: 36 ML/M2
ECHO LV EDV NDEX A2C: 39 ML/M2
ECHO LV EF PHYSICIAN: 50 %
ECHO LV EJECTION FRACTION A2C: 54 %
ECHO LV EJECTION FRACTION A4C: 44 %
ECHO LV EJECTION FRACTION BIPLANE: 51 % (ref 55–100)
ECHO LV ESV A2C: 49 ML
ECHO LV ESV A4C: 49 ML
ECHO LV ESV BP: 49 ML (ref 22–58)
ECHO LV ESV INDEX A2C: 18 ML/M2
ECHO LV ESV INDEX A4C: 18 ML/M2
ECHO LV ESV INDEX BP: 18 ML/M2
ECHO LV FRACTIONAL SHORTENING: 30 % (ref 28–44)
ECHO LV INTERNAL DIMENSION DIASTOLE INDEX: 1.83 CM/M2
ECHO LV INTERNAL DIMENSION DIASTOLIC: 5 CM (ref 4.2–5.9)
ECHO LV INTERNAL DIMENSION SYSTOLIC INDEX: 1.28 CM/M2
ECHO LV INTERNAL DIMENSION SYSTOLIC: 3.5 CM
ECHO LV IVSD: 1.5 CM (ref 0.6–1)
ECHO LV MASS 2D: 322.6 G (ref 88–224)
ECHO LV MASS INDEX 2D: 118.2 G/M2 (ref 49–115)
ECHO LV POSTERIOR WALL DIASTOLIC: 1.5 CM (ref 0.6–1)
ECHO LV RELATIVE WALL THICKNESS RATIO: 0.6
ECHO LVOT AREA: 3.5 CM2
ECHO LVOT AV VTI INDEX: 1.02
ECHO LVOT DIAM: 2.1 CM
ECHO LVOT MEAN GRADIENT: 2 MMHG
ECHO LVOT PEAK GRADIENT: 3 MMHG
ECHO LVOT PEAK VELOCITY: 0.9 M/S
ECHO LVOT STROKE VOLUME INDEX: 24.1 ML/M2
ECHO LVOT SV: 65.8 ML
ECHO LVOT VTI: 19 CM
ECHO PULMONARY ARTERY END DIASTOLIC PRESSURE: 8 MMHG
ECHO PV MAX VELOCITY: 0.7 M/S
ECHO PV PEAK GRADIENT: 2 MMHG
ECHO PV REGURGITANT MAX VELOCITY: 1.5 M/S
ECHO RA MINOR AXIS INDEX: 2.05 CM/M2
ECHO RA MINOR AXIS: 5.6 CM
ECHO RIGHT VENTRICULAR SYSTOLIC PRESSURE (RVSP): 45 MMHG
ECHO RV INTERNAL DIMENSION: 5.6 CM
ECHO TV REGURGITANT MAX VELOCITY: 3.05 M/S
ECHO TV REGURGITANT PEAK GRADIENT: 37 MMHG

## 2022-12-15 NOTE — TELEPHONE ENCOUNTER
Verified Patient with two identifiers  This nurse told patient that is good for him to get the sleep study done as recommended by dr. Huang Jules on his LOV . Patient voiced understanding.

## 2022-12-19 ENCOUNTER — OFFICE VISIT (OUTPATIENT)
Dept: CARDIOLOGY CLINIC | Age: 62
End: 2022-12-19

## 2022-12-19 DIAGNOSIS — Z95.0 CARDIAC PACEMAKER IN SITU: Primary | ICD-10-CM

## 2022-12-19 NOTE — PROGRESS NOTES
C/ MDT REMOTE   Scheduled remote transmission. Device functioning appropriately as programmed. See scanned documents.  Chargeable visit

## 2022-12-26 ENCOUNTER — DOCUMENTATION ONLY (OUTPATIENT)
Dept: CARDIOLOGY CLINIC | Age: 62
End: 2022-12-26

## 2022-12-26 NOTE — PROGRESS NOTES
Pacemaker reached OLEG 12/7/22  He is already scheduled for gen change 1/4/23 with me    Future Appointments   Date Time Provider Jam Muelleri   1/13/2023  9:00 AM PACEMAKER3, SARMAD SALAS BS AMB   1/13/2023  9:30 AM WOUND CHECKS, SARMAD NORMAN AMB   1/19/2023 10:20 AM Dani Diaz MD [facility] BS AMB   1/24/2023  7:45 AM CARDIAC NUCLEAR STRESS Brenda Ng [address] [facility]   1/24/2023  9:00 AM [facility] [place] NM RM [facility]   1/25/2023  7:30 AM [facility] [place] NM INJ RM 1 [facility]   2/1/2023 10:20 AM MD MARITZA Knox BS AMB   3/14/2023 10:00 AM ECHO, SARMAD SAALS BS AMB   3/14/2023 11:20 AM Jean Carlos Castañeda MD CAVREY BS AMB   3/28/2023  1:00 PM PACEMAKER3, SARMAD NORMAN AMB   3/28/2023  1:20 PM MD MARITZA Matthews BS AMB

## 2022-12-28 LAB
BUN SERPL-MCNC: 15 MG/DL (ref 8–27)
BUN/CREAT SERPL: 16 (ref 10–24)
CALCIUM SERPL-MCNC: 9 MG/DL (ref 8.6–10.2)
CHLORIDE SERPL-SCNC: 104 MMOL/L (ref 96–106)
CO2 SERPL-SCNC: 23 MMOL/L (ref 20–29)
CREAT SERPL-MCNC: 0.93 MG/DL (ref 0.76–1.27)
EGFR: 94 ML/MIN/1.73
ERYTHROCYTE [DISTWIDTH] IN BLOOD BY AUTOMATED COUNT: 12.7 % (ref 11.6–15.4)
GLUCOSE SERPL-MCNC: 86 MG/DL (ref 70–99)
HCT VFR BLD AUTO: 44.9 % (ref 37.5–51)
HGB BLD-MCNC: 15 G/DL (ref 13–17.7)
MCH RBC QN AUTO: 31.4 PG (ref 26.6–33)
MCHC RBC AUTO-ENTMCNC: 33.4 G/DL (ref 31.5–35.7)
MCV RBC AUTO: 94 FL (ref 79–97)
PLATELET # BLD AUTO: 219 X10E3/UL (ref 150–450)
POTASSIUM SERPL-SCNC: 5.1 MMOL/L (ref 3.5–5.2)
RBC # BLD AUTO: 4.77 X10E6/UL (ref 4.14–5.8)
SODIUM SERPL-SCNC: 141 MMOL/L (ref 134–144)
WBC # BLD AUTO: 7.3 X10E3/UL (ref 3.4–10.8)

## 2022-12-30 ENCOUNTER — TELEPHONE (OUTPATIENT)
Dept: CARDIOLOGY CLINIC | Age: 62
End: 2022-12-30

## 2022-12-30 RX ORDER — SODIUM CHLORIDE 0.9 % (FLUSH) 0.9 %
5-40 SYRINGE (ML) INJECTION EVERY 8 HOURS
OUTPATIENT
Start: 2022-12-30

## 2022-12-30 RX ORDER — SODIUM CHLORIDE 0.9 % (FLUSH) 0.9 %
5-40 SYRINGE (ML) INJECTION AS NEEDED
OUTPATIENT
Start: 2022-12-30

## 2022-12-30 NOTE — TELEPHONE ENCOUNTER
Verified patient with two types of identifiers. Reminded patient of upcoming procedure next week. Reviewed pre procedure instructions. Cori Gutierrez states he has short term disability paper work he would like MD to complete. Notified that Dr. Syl Hayes will not complete disability paper work. Patient is having a generator change and will not have arm restrictions. Patient states he has been out of work since 12/11/22. He states Dr. Oliver Carr took him out of work. Notified that Dr. Oliver Carr will need to complete if he is the one who took him out of work. He will fax to our office once received. Patient verbalized understanding and will call with any other questions.

## 2023-01-04 ENCOUNTER — HOSPITAL ENCOUNTER (OUTPATIENT)
Age: 63
Setting detail: OUTPATIENT SURGERY
Discharge: HOME | End: 2023-01-04
Attending: INTERNAL MEDICINE | Admitting: INTERNAL MEDICINE
Payer: COMMERCIAL

## 2023-01-04 ENCOUNTER — TELEPHONE (OUTPATIENT)
Dept: CARDIOLOGY CLINIC | Age: 63
End: 2023-01-04

## 2023-01-04 VITALS
WEIGHT: 315 LBS | HEIGHT: 76 IN | BODY MASS INDEX: 38.36 KG/M2 | SYSTOLIC BLOOD PRESSURE: 118 MMHG | DIASTOLIC BLOOD PRESSURE: 72 MMHG | OXYGEN SATURATION: 98 % | HEART RATE: 62 BPM | TEMPERATURE: 98 F | RESPIRATION RATE: 17 BRPM

## 2023-01-04 DIAGNOSIS — I97.190 COMPLETE AV BLOCK DUE TO AV NODAL ABLATION (HCC): Primary | ICD-10-CM

## 2023-01-04 DIAGNOSIS — Z45.018 PACEMAKER END OF LIFE: ICD-10-CM

## 2023-01-04 DIAGNOSIS — Z95.0 PACEMAKER: ICD-10-CM

## 2023-01-04 DIAGNOSIS — I44.2 COMPLETE AV BLOCK DUE TO AV NODAL ABLATION (HCC): Primary | ICD-10-CM

## 2023-01-04 DIAGNOSIS — I49.5 SICK SINUS SYNDROME (HCC): ICD-10-CM

## 2023-01-04 DIAGNOSIS — I48.19 PERSISTENT ATRIAL FIBRILLATION WITH RAPID VENTRICULAR RESPONSE (HCC): ICD-10-CM

## 2023-01-04 PROCEDURE — 77030018729 HC ELECTRD DEFIB PAD CARD -B: Performed by: INTERNAL MEDICINE

## 2023-01-04 PROCEDURE — C1760 CLOSURE DEV, VASC: HCPCS | Performed by: INTERNAL MEDICINE

## 2023-01-04 PROCEDURE — 77030028700 HC BLD TISS PLSM MEDT -E: Performed by: INTERNAL MEDICINE

## 2023-01-04 PROCEDURE — 74011250636 HC RX REV CODE- 250/636: Performed by: INTERNAL MEDICINE

## 2023-01-04 PROCEDURE — 2709999900 HC NON-CHARGEABLE SUPPLY: Performed by: INTERNAL MEDICINE

## 2023-01-04 PROCEDURE — 77030022704 HC SUT VLOC COVD -B: Performed by: INTERNAL MEDICINE

## 2023-01-04 PROCEDURE — C1894 INTRO/SHEATH, NON-LASER: HCPCS | Performed by: INTERNAL MEDICINE

## 2023-01-04 PROCEDURE — C1781 MESH (IMPLANTABLE): HCPCS | Performed by: INTERNAL MEDICINE

## 2023-01-04 PROCEDURE — 33228 REMV&REPLC PM GEN DUAL LEAD: CPT | Performed by: INTERNAL MEDICINE

## 2023-01-04 PROCEDURE — 77030032060 HC PWDR HEMSTAT ARISTA ASRB 3GM BARD -C: Performed by: INTERNAL MEDICINE

## 2023-01-04 PROCEDURE — 77030041075 HC DRSG AG OPTIFRM MDII -B: Performed by: INTERNAL MEDICINE

## 2023-01-04 PROCEDURE — 99152 MOD SED SAME PHYS/QHP 5/>YRS: CPT | Performed by: INTERNAL MEDICINE

## 2023-01-04 PROCEDURE — 99153 MOD SED SAME PHYS/QHP EA: CPT | Performed by: INTERNAL MEDICINE

## 2023-01-04 PROCEDURE — 77030041244 HC CBL PACE EXT TEMP REMG -B: Performed by: INTERNAL MEDICINE

## 2023-01-04 PROCEDURE — C1892 INTRO/SHEATH,FIXED,PEEL-AWAY: HCPCS | Performed by: INTERNAL MEDICINE

## 2023-01-04 PROCEDURE — 74011000250 HC RX REV CODE- 250: Performed by: INTERNAL MEDICINE

## 2023-01-04 PROCEDURE — C1785 PMKR, DUAL, RATE-RESP: HCPCS | Performed by: INTERNAL MEDICINE

## 2023-01-04 PROCEDURE — 77030005319 HC CATH PACE FEM BL STJU -C: Performed by: INTERNAL MEDICINE

## 2023-01-04 DEVICE — ENVELOPE CMRM6133 ABSORB LRG MR
Type: IMPLANTABLE DEVICE | Status: FUNCTIONAL
Brand: TYRX™

## 2023-01-04 DEVICE — PCMKR AZURE XT DR MRI --: Type: IMPLANTABLE DEVICE | Status: FUNCTIONAL

## 2023-01-04 RX ORDER — SODIUM CHLORIDE 0.9 % (FLUSH) 0.9 %
5-40 SYRINGE (ML) INJECTION AS NEEDED
Status: DISCONTINUED | OUTPATIENT
Start: 2023-01-04 | End: 2023-01-04 | Stop reason: HOSPADM

## 2023-01-04 RX ORDER — SODIUM CHLORIDE 0.9 % (FLUSH) 0.9 %
5-40 SYRINGE (ML) INJECTION AS NEEDED
Status: CANCELLED | OUTPATIENT
Start: 2023-01-04

## 2023-01-04 RX ORDER — ACETAMINOPHEN 325 MG/1
650 TABLET ORAL
Status: CANCELLED | OUTPATIENT
Start: 2023-01-04

## 2023-01-04 RX ORDER — HYDROCODONE BITARTRATE AND ACETAMINOPHEN 5; 325 MG/1; MG/1
1 TABLET ORAL
Status: CANCELLED | OUTPATIENT
Start: 2023-01-04

## 2023-01-04 RX ORDER — SODIUM CHLORIDE 0.9 % (FLUSH) 0.9 %
5-40 SYRINGE (ML) INJECTION EVERY 8 HOURS
Status: CANCELLED | OUTPATIENT
Start: 2023-01-04

## 2023-01-04 RX ORDER — SODIUM CHLORIDE 0.9 % (FLUSH) 0.9 %
5-40 SYRINGE (ML) INJECTION EVERY 8 HOURS
Status: DISCONTINUED | OUTPATIENT
Start: 2023-01-04 | End: 2023-01-04 | Stop reason: HOSPADM

## 2023-01-04 RX ORDER — ONDANSETRON 2 MG/ML
4 INJECTION INTRAMUSCULAR; INTRAVENOUS
Status: CANCELLED | OUTPATIENT
Start: 2023-01-04

## 2023-01-04 RX ORDER — FENTANYL CITRATE 50 UG/ML
INJECTION, SOLUTION INTRAMUSCULAR; INTRAVENOUS AS NEEDED
Status: DISCONTINUED | OUTPATIENT
Start: 2023-01-04 | End: 2023-01-04 | Stop reason: HOSPADM

## 2023-01-04 RX ORDER — MIDAZOLAM HYDROCHLORIDE 1 MG/ML
INJECTION, SOLUTION INTRAMUSCULAR; INTRAVENOUS AS NEEDED
Status: DISCONTINUED | OUTPATIENT
Start: 2023-01-04 | End: 2023-01-04 | Stop reason: HOSPADM

## 2023-01-04 NOTE — PROGRESS NOTES
1330  TRANSFER - IN REPORT:    Verbal report received from Yumiko on Ilan Staff  being received from [address] [place] [address] for routine progression of care. Report consisted of patients Situation, Background, Assessment and Recommendations(SBAR). Information from the following report(s) SBAR, Procedure Summary, Intake/Output, MAR, Med Rec Status, and Cardiac Rhythm AV Paced  was reviewed with the receiving clinician. Opportunity for questions and clarification was provided. Assessment completed upon patients arrival to [address] [place] [address] and care assumed. Cardiac Cath Lab Recovery Arrival Note:    Ilan Staff arrived to [facility] recovery area. Patient procedure= Gen Change with temp pacer. Patient on cardiac monitor, non-invasive blood pressure, SPO2 monitor. On O2 @ 2 lpm via NC.  IV  saline locked. Patient status doing well without problems. Patient is A&Ox 4. Patient reports no complaints. PROCEDURE SITE CHECK:    Procedure site:without any bleeding and no hematoma, no pain/discomfort reported at procedure site. No change in patient status. Continue to monitor patient and status.     MD said pt can go home at 1530, no CXR necessary    RN placing Ice pack on chest site

## 2023-01-04 NOTE — DISCHARGE INSTRUCTIONS
PATIENT INSTRUCTIONS POST-PACEMAKER GENERATOR CHANGE    1. No arm range of motion or lifting restrictions related to today's procedure. 2.  You may shower with your Aquacel chest dressing in place. You may remove your Aquacel chest dressing in 1 week, or it can be removed in clinic at follow up if you prefer. 3.  Do not drive for 1 day     4. Call Dr. Tomasz Enrique at [phone] if you experience any of the following symptoms:  1. Redness at the pacemaker site  2. Swelling at or around the pacemaker or in the left arm  3. Pain around the pacemaker  4. Dizziness, lightheadedness, fainting spells  5. Lack of energy  6. Shortness of breath  7. Rapid heart rate  8. Chest or muscle twitches    5. Follow-up with Dr. Debbie Menjivar office as scheduled below. Future Appointments   Date Time Provider Jam Echevarria   1/13/2023  9:00 AM PACEMAKER3SARMAD BS AMB   1/13/2023  9:30 AM WOUND CHECKSSARMAD BS AMB   1/19/2023 10:20 [facility]   1/24/2023  7:45 AM CARDIAC NUCLEAR STRESS Brenda Ng [address] [facility]   1/24/2023  9:00 AM [facility] [place] NM RM 1 [facility]   1/25/2023  7:30 AM [facility] NM INJ RM 1 [facility]   2/1/2023 10:20 AM MD MARITZA Germain BS AMB   3/14/2023 10:00 AM ECHOSARMAD BS AMB   3/14/2023 11:20 AM Jean Carlos Castañeda MD [facility] BS AMB   3/28/2023  1:00 PM PACEMAKER3SARMAD BS AMB   3/28/2023  1:20 PM MD MARITZA Layton BS AMB     6. You may use over the counter pain medication and ice pack for pain relief as needed. Vito Garsia M.D.  [facility]  Electrophysiology/Cardiology  [facility] [place]  [address] [facility], [facility], [address] [place] [address]  [phone]          Cardiac Procedure Groin Site Discharge Instructions    Do not drive, operate any machinery, or sign any legal documents for 24 hours after your procedure. You must have someone to drive you home. You may take a shower 24 hours after your cardiac catheterization. Be sure to get the dressing wet and then remove it; gently wash the area with warm soapy water. Pat dry and leave open to air. To help prevent infections, be sure to keep the cath site clean and dry. No lotions, creams, powders, ointments, etc. in the cath site for approximately 1 week. Do not soak site with such activities as: take a tub bath, get in a hot tub or swimming pool for approximately 5 days or until the cath site is completely healed. No strenuous activity or heavy lifting over 10 lbs. for 7 days. Drink plenty of fluids for 24-48 hours after your cath to flush the contrast dye from your kidneys. No alcoholic beverages for 24 hours. You may resume your previous diet (low fat, low cholesterol) after your cath. After your cath, some bruising or discomfort is common during the healing process. An ice pack and Tylenol, 1-2 tablets every 6 hours as needed, is recommended if you experience any discomfort. If you experience any signs or symptoms of infection such as fever, chills, or poorly healing incision, persistent tenderness or swelling around the cath site, redness and/or warmth to the touch, numbness, significant tingling or pain at the cath site or affected extremity, rash, drainage from the cath site, or if the affected arm or leg feels tight or swollen, call your physician right away. 30 minutes of activity a day is recommended. If it is too hot or too cold outside, perform activities in doors. If bleeding at the cath site occurs, take a clean gauze pad and apply direct pressure to the cath site just above the puncture site. Call 911 immediately, and continue to apply direct pressure until an ambulance gets to your location to take you to the ER. DO not drive yourself, do not have anyone else drive you.      You may return to work  2  days after your cardiac cath if no cath site bleeding.

## 2023-01-04 NOTE — PROGRESS NOTES
[address] reviewed discharge instructions with pt and pt had an opportunity to ask questions. RN got pt up after 2 hour bed rest.   Pt walked to restroom and voided successfully.    RN called pt ride home    [phone]  Pt getting dressed  RN removed pt IV  Pt having a snack, PO diet tolerated    1615  RN discharged to family at main entrance with bedside monitor, temp pacer card, extra band aids, lunch box, belongings, discharge instructions, Ice pack, and cell phone

## 2023-01-04 NOTE — PROCEDURES
Cardiac Procedure Note   Patient: Tom Reid  MRN: [id number]  SSN: [id number]   YOB: 1960 Age: 63 y.o.   Sex: male    Date of Procedure: 1/4/2023   Pre-procedure Diagnosis: persistent atrial fibrillation complete av block, pacemaker end of life  Morbid obesity  Post-procedure Diagnosis: same  Procedure:   Vascular ultrasound guided access for right femoral vein  Temporary pacing catheter placement  Replacement of dual chamber pacemaker  :  Dr. Spike Wagoner MD    Assistant(s):  None  Anesthesia: Moderate Sedation   Estimated Blood Loss: Less than 10 mL   Specimens Removed: None  Findings: left chest pacer gen change after right femoral vein temp pacing catheter placement with vascular ultrasound guidance  Complications: None   Implants: dual chamber [employer] pacemaker  Signed by:  Spike Wagoner MD  1/4/2023  2:00 PM

## 2023-01-04 NOTE — H&P
[facility] Cardiology  Cardiac Electrophysiology H&P Note                      Patient Name: Liam Gómez - OSI:[birth year] - XJB:[id number]  Primary Cardiologist: Cristina Coe MD  Electrophysiologist: Rita Pittman MD     Reason for visit: Dual chamber pacemaker generator change    HPI:  He is s/p Medtronic dual chamber pacemaker (DOI 08/17/2012) & AV node ablation (07/16/2018). His device reached OLEG 12/07/2022. Echo in 12/2022 showed LVEF 50% with mildly dilated RV, mild ERVIN. Anticoagulated with Pradaxa, denies bleeding issues. Previous:  NSVT noted 03/2021. S/p AV node ablation 07/16/2018. Recurrent AF with RVR despite AF ablation 01/10/2017. Admitted with recurrent AF in 2016, was cardioverted. S/p Medtronic dual chamber pacemaker (DOI 08/17/2012). Dr. Adolfo Ledezma is primary cardiologist.         Assessment and Plan   Addendum from EP attending: This patient was seen and examined by me in a face-to-face visit today. I reviewed the medical record including lab results, imaging and other provider notes. I confirmed the history as described above. I spoke to the patient, obtained history and examined the patient. I discussed assessments and plans in details with nurse practitioner. Below are my treatment plans and recommendations. He is feeling fine  Vital signs are stable  Exam shows     Nursing note and vitals reviewed. Constitutional: well-developed and well-nourished. No distress. Head: Normocephalic and atraumatic. Eyes: Pupils are equal, round   Neck: Neck supple. No JVD present. Cardiovascular: Normal rate, regular rhythm and normal heart sounds. Exam reveals no gallop and no friction rub. No murmur heard. Pulmonary/Chest: Effort normal and breath sounds normal. No wheezes. Abdominal: Soft, no rebound. Musculoskeletal: no edema obese  Neurological: alert,oriented.    Skin: Skin is warm and dry,left chest pacer scar  Psychiatric: normal mood and affect. Behavior is normal. Judgment and thought content normal.      Assessment and Plan:   Medtronic dual chamber pacemaker (DOI 08/17/2012): Reached OLEG 12/07/2022. Not a candidate for CRT upgrade despite chronic RV pacing s/p AV node ablation. LVEF 50% in 12/2022. Reviewed risks/benefits of dual chamber pacemaker generator change. He agrees to proceed as scheduled. He is completely dependent so he agrees with temporary pacing catheter placement first    Persistent AF: Permanently rate controlled s/p AV node ablation. NSVT: Last noted 2021. Continue metoprolol. Anticoagulation: Continue Pradaxa as previously prescribed for embolic CVA prophylaxis. Denies bleeding issues. Jade Rosales M.D. [facility]  Electrophysiology/Cardiology  [facility]  [address] [place] [address]                              [phone]                                                 ____________________________________________________________    Cardiac testing  12/14/22    ECHO ADULT COMPLETE 12/15/2022 12/15/2022    Interpretation Summary    Left Ventricle: Low normal left ventricular systolic function. EF by visual approximation is 50%. EF by 2D Simpsons Biplane is 51%. Left ventricle size is normal. Moderately increased wall thickness. See diagram for wall motion findings. Right Ventricle: Not well visualized. Not well visualized. Right ventricle is mildly dilated. Lead present in the right ventricle. Mitral Valve: Mild regurgitation. Tricuspid Valve: Mildly elevated RVSP. The estimated RVSP is 45 mmHg. Left Atrium: Left atrium is mildly dilated. Right Atrium: Right atrium is mildly dilated.     Signed by: Sona Germain MD on 12/15/2022  1:20 PM      11/21/16    NM CARDIAC SPECT W STRS/REST MULT 11/23/2016 11/23/2016    Narrative  Indication: Chest pain    A Daryle Block myocardial SPECT gated wall motion study was performed utilizing 32  mCi of Tc MYOVIEW for rest and 33 mCi for stress. SPECT imaging at stress and rest demonstrates no definite evidence of ischemia  and/or infarction. There is slight thinning of the inferior wall most likely attenuation artifact. Left ventricular ejection fraction equals 52%. Left ventricular wall motion and  thickening are within normal limits. Impression  IMPRESSION:  1. No definite evidence of ischemia and/or infarction. 2. LVEF equals 52%. Left ventricular wall motion and thickening is normal.    Signed by: Chantel Humphreys MD on 11/23/2016 10:00 AM        Review of Systems    [x]All other systems reviewed and all negative except as written in HPI    [] Patient unable to provide secondary to condition         Past Medical History:   Diagnosis Date    Atrial fibrillation [facility])     Pacemaker      Past Surgical History:   Procedure Laterality Date    ABLATION, ATRIAL FIBRILLATION   7/25/2012         ECHO INTRACARDIAC  7/25/2012         EP STUDY W/O INDUCTION  7/25/2012         HX PACEMAKER      HX WISDOM TEETH EXTRACTION      42 yrs ago    INS PPM/ICD LED DUAL ONLY  8/17/2012         [place] ICAR CATHETER ABLATION ATRIOVENTR NODE FUNCTION  7/16/2018         IL INTRACARDIAC ELECTROPHYSIOLOGIC 3D MAPPING  7/25/2012         IL LEFT HEART CATH BY TRANSEPTAL PUNCTURE  7/25/2012         CHRISTEL ECHO COLOR FLOW  7/25/2012          Social Hx:  reports that he has never smoked. He has never used smokeless tobacco. He reports that he does not drink alcohol and does not use drugs. Family Hx: family history includes Hypertension in his mother. Allergies   Allergen Reactions    Silvadene [Silver Sulfadiazine] Swelling          OBJECTIVE:    Physical Exam    Vitals: There were no vitals filed for this visit. General:    Alert, cooperative, no distress, appears stated age. Neck:   Supple, no carotid bruit and no JVD. Back:     Symmetric.    Lungs: Clear to auscultation bilaterally. Heart[de-identified]    Regular rate and rhythm. No murmur, click, rub or gallop. Abdomen:     Soft, non-tender. Bowel sounds normal.    MSK:   Extremities normal, atraumatic. Moves extremities independently. Vasc/lymph:   No lower extremity edema. Skin:   Skin color normal. No rashes or lesions on visible areas. Pacemaker site well healed. Neurologic:   Alert, Moves all extremities. Data Review:     Radiology:   XR Results (most recent):  Results from Hospital Encounter encounter on 12/11/22    XR CHEST PA LAT    Narrative  Indication:  dyspnea    Exam: PA and lateral views of the chest.    Direct comparison is made to prior CXR dated December 2018. Findings: Cardiomediastinal silhouette is within normal limits. Intact pacer  leads and right atrium and right ventricle. Lungs are clear bilaterally. Pleural  spaces are normal. Osseous structures are intact. Impression  No acute cardiopulmonary disease. CT Results (most recent):  Results from Hospital Encounter encounter on 12/11/22    CT CHEST W CONT    Narrative  EXAM:  CT CHEST W CONT    INDICATION:  42-year-old male with shortness of breath. COMPARISON: 12/11/2022 chest radiographs and 12/13/2016 cardiac CT. CONTRAST:  100 cc of Isovue-300. TECHNIQUE: Multislice helical CT was performed from the thoracic inlet to the  adrenal glands after the uneventful administration of intravenous contrast.  Contiguous 5 mm axial images were reconstructed and lung and soft tissue windows  were generated. Coronal and sagittal reformations were generated. CT dose  reduction was achieved through use of a standardized protocol tailored for this  examination and automatic exposure control for dose modulation. FINDINGS:  Lower neck: No thyroid nodule. Soft tissue within normal limits. Lungs/Pleura: No evidence of consolidation, pleural effusion, or pneumothorax. No suspicious pulmonary nodules.  Subsegmental scarring/atelectasis in the left  lower lobe with elevated left hemidiaphragm, similar to prior study. Mediastinum: Bilateral atrial enlargement. No pericardial effusion. Left chest  wall implantable cardiac pacemaker leads extending to right atrium and right  ventricle. The main pulmonary artery and thoracic aorta caliber as are within  normal limits. No central pulmonary embolism. Bovine arch aortic branching  pattern, developmental variant. The esophagus is grossly within normal limits. No pathologic mediastinal or hilar adenopathy. Chest wall/Soft Tissue: No mass lesion or pathologic axillary lymphadenopathy. Partially imaged left chest wall implantable cardiac pacemaker battery pack  associated metallic artifact preclude optimal evaluation of adjacent structures. Upper Abdomen: No suspicious mass lesion. Multiple left kidney cysts. Bones: No evidence of acute fracture, dislocation, or aggressive osseous  abnormality. There is mild levoscoliosis of the thoracic spine. Impression  No acute cardiopulmonary abnormality. Cardiomegaly with implantable cardiac pacemaker. MRI Results (most recent):  No results found for this or any previous visit. No results for input(s): CPK, TROIQ in the last 72 hours. No lab exists for component: CKQMB, CPKMB, BMPP  No results for input(s): NA, K, CL, CO2, BUN, CREA, GLU, PHOS, CA in the last 72 hours. No results for input(s): WBC, HGB, HCT, PLT, HGBEXT, HCTEXT, PLTEXT in the last 72 hours. No results for input(s): PTP, INR, AP, INREXT in the last 72 hours. No lab exists for component: PTTP, GPT, SGOT  No results for input(s): CHOL, LDLC in the last 72 hours. No lab exists for component: TGL, HDLC,  HBA1C  No results for input(s): CRP, TSH, TSHEXT in the last 72 hours. No lab exists for component: ESR        Current meds:  No current facility-administered medications for this encounter.       Lokesh Leggett NP  [facility]iology  [address] [facility], [address] [place] [address] [address]  [facility], [address]  [phone]      Sherrie Kline M.D.   Electrophysiology/Cardiology  [facility]  [address], [address]                               [phone] [address] [facility], Trell Allen NP

## 2023-01-04 NOTE — Clinical Note
Right femoral vein. Accessed successfully. Micropuncture needle used. Using fluoro and ultrasound guidance.  Number of attempts =  1.

## 2023-01-04 NOTE — PROGRESS NOTES
Cardiac Cath Lab Recovery Arrival Note:      Liam Gómez arrived to Cardiac Cath Lab, Recovery Area. Staff introduced to patient. Patient identifiers verified with NAME and DATE OF BIRTH. Procedure verified with patient. Consent forms reviewed and signed by patient or authorized representative and verified. Allergies verified. Patient and family oriented to department. Patient and family informed of procedure and plan of care. Questions answered with review. Patient prepped for procedure, per orders from physician, prior to arrival.    Patient on cardiac monitor, non-invasive blood pressure, SPO2 monitor. On room air. Patient is A&Ox 4. Patient reports shortness of breath on exertion. Patient in stretcher, in low position, with side rails up, call bell within reach, patient instructed to call if assistance as needed. [facility] [place] [address] [employer]. Patient family has pager # Leonard Avendaño [phone]  Family in: Waiting upstairs.    Prep by: Taj Hdez

## 2023-01-12 ENCOUNTER — OFFICE VISIT (OUTPATIENT)
Dept: INTERNAL MEDICINE CLINIC | Age: 63
End: 2023-01-12
Payer: COMMERCIAL

## 2023-01-12 VITALS
HEART RATE: 84 BPM | WEIGHT: 315 LBS | BODY MASS INDEX: 38.36 KG/M2 | HEIGHT: 76 IN | RESPIRATION RATE: 16 BRPM | OXYGEN SATURATION: 98 % | TEMPERATURE: 97.5 F | SYSTOLIC BLOOD PRESSURE: 122 MMHG | DIASTOLIC BLOOD PRESSURE: 78 MMHG

## 2023-01-12 DIAGNOSIS — K43.9 ABDOMINAL WALL HERNIA: ICD-10-CM

## 2023-01-12 DIAGNOSIS — R06.09 DOE (DYSPNEA ON EXERTION): Primary | ICD-10-CM

## 2023-01-12 DIAGNOSIS — Z23 ENCOUNTER FOR IMMUNIZATION: ICD-10-CM

## 2023-01-12 DIAGNOSIS — I49.5 SICK SINUS SYNDROME (HCC): ICD-10-CM

## 2023-01-12 DIAGNOSIS — Z95.0 S/P CARDIAC PACEMAKER PROCEDURE: ICD-10-CM

## 2023-01-12 PROCEDURE — 90471 IMMUNIZATION ADMIN: CPT | Performed by: INTERNAL MEDICINE

## 2023-01-12 PROCEDURE — 90686 IIV4 VACC NO PRSV 0.5 ML IM: CPT | Performed by: INTERNAL MEDICINE

## 2023-01-12 PROCEDURE — 99214 OFFICE O/P EST MOD 30 MIN: CPT | Performed by: INTERNAL MEDICINE

## 2023-01-12 NOTE — PROGRESS NOTES
HISTORY OF PRESENT ILLNESS  Klarissa Andrade is a 63 y.o. male here for follow-up. He has been feeling short of breath on exertion since that beginning of December. He is a  in [employer] in the [address] [place] [address] Drive. Not able to work much. He went to see Dr. Leonardo Sparks, had echocardiogram done showed ejection fraction 51% with LVH and leaking valve. BNP level was high. Patient scheduled to have stress test.  Also he was sent to sleep specialist for evaluation of sleep apnea. He had history of sick sinus syndrome and pacemaker placement done in the past pacemaker battery worn out. He underwent dual-chamber pacemaker placement this time. Symptomatically he started feeling slightly better but still short of breath. Noticed to have large abdominal wall hernia on lately. No abdominal pain. All labs done in the emergency room, reviewed by me. Need flu shot. HPI    ROS    Physical Exam    ASSESSMENT and PLAN  Diagnoses and all orders for this visit:    1. GUERRERO (dyspnea on exertion)    He is seeing in Dr. Leonardo Sparks. Echocardiogram done, reviewed by me. Ejection fraction 51% with left ventricular wall thickness present. Has mild leaky valve. BNP level was slightly below 500. Patient is scheduled to see Dr. Solis Garcia to assess for sleep apnea. He is still feeling short of breath on exertion but is slightly better. He will follow-up with her Dr. Leonardo Sparks and to get the diagnosis. Chest x-ray was normal.  Pacemaker battery were out, it was replaced. Patient symptomatically feeling better since then. 2. Abdominal wall hernia      -     REFERRAL TO GENERAL SURGERY    3. Sick sinus syndrome [facility])  He had pacemaker with battery was not working. Dual-chamber pacemaker is placed. Patient will follow up with Dr. Genevieve Gutierrez. 4. S/P cardiac pacemaker procedure  -Doing well.     5. Encounter for immunization    We will give,  -     INFLUENZA, FLUARIX, FLULAVAL, FLUZONE (AGE 6 MO+), AFLURIA(AGE 3Y+) IM, PF, 0.5 ML   Discussed expected course/resolution/complications of diagnosis in detail with patient. Medication risks/benefits/costs/interactions/alternatives discussed with patient. Discussed COVID-19 infection precaution with patient. Pt was given an after visit summary which includes diagnoses, current medications & vitals. Pt expressed understanding with the diagnosis and plan.

## 2023-01-12 NOTE — LETTER
NOTIFICATION RETURN TO WORK / SCHOOL    1/12/2023 2:50 PM    Mr. Sara Das  [address] [place] [id number]      To Whom It May Concern:    Sara Das is currently under the care of [address]. He will return to work on february 2nd,2023. If there are questions or concerns please have the patient contact our office.         Sincerely,      Soheila Thompson MD

## 2023-01-13 ENCOUNTER — OFFICE VISIT (OUTPATIENT)
Dept: CARDIOLOGY CLINIC | Age: 63
End: 2023-01-13

## 2023-01-13 ENCOUNTER — CLINICAL SUPPORT (OUTPATIENT)
Dept: CARDIOLOGY CLINIC | Age: 63
End: 2023-01-13

## 2023-01-13 ENCOUNTER — TELEPHONE (OUTPATIENT)
Dept: SURGERY | Age: 63
End: 2023-01-13

## 2023-01-13 VITALS — OXYGEN SATURATION: 98 % | HEART RATE: 61 BPM | SYSTOLIC BLOOD PRESSURE: 116 MMHG | DIASTOLIC BLOOD PRESSURE: 78 MMHG

## 2023-01-13 DIAGNOSIS — Z95.0 CARDIAC PACEMAKER IN SITU: Primary | ICD-10-CM

## 2023-01-13 NOTE — PROGRESS NOTES
C/ DC PM post 2 week implant clinic check. Device functioning appropriately as programmed. See scanned documents in media manger. Patient presents for wound check post-device implantation. The dressing was removed prior to the visit and the site was inspected by Estelle Goodell, RN. The site appeared to be well-healing without ecchymosis/tenderness/erythema. Denies pain, fevers, discharge.

## 2023-01-13 NOTE — TELEPHONE ENCOUNTER
Called patient in attempt to schedule appt with Dr. Jairo Ashford regarding referral from Dr. Alberto Ortiz for an abdominal wall hernia. No answer, left voicemail.

## 2023-01-13 NOTE — PROGRESS NOTES
Cardiac Electrophysiology Wound Check Note      Wound Check   Patient is here for wound check. No fever, drainage   Physical Exam   Constitutional: well-developed and well-nourished. Skin: Left side pocket is healing without  drainage, hematoma. The wound is pink and intact with skin glue. ASSESSMENT and PLAN   The incision is healing without drainage, hematoma. Pink and Intact with skin glue.     current treatment plan is effective, no change in therapy       Follow-up Disposition:  Return 3 months I will check via device clinic or remote monitoring in the future

## 2023-01-16 ENCOUNTER — OFFICE VISIT (OUTPATIENT)
Dept: CARDIOLOGY CLINIC | Age: 63
End: 2023-01-16
Payer: COMMERCIAL

## 2023-01-16 DIAGNOSIS — Z95.0 CARDIAC PACEMAKER IN SITU: Primary | ICD-10-CM

## 2023-01-16 NOTE — PROGRESS NOTES
N/C DC PM clinic check. Device functioning appropriately as programmed. See scanned documents in media manger.

## 2023-01-19 ENCOUNTER — DOCUMENTATION ONLY (OUTPATIENT)
Dept: SLEEP MEDICINE | Age: 63
End: 2023-01-19

## 2023-01-19 ENCOUNTER — OFFICE VISIT (OUTPATIENT)
Dept: SLEEP MEDICINE | Age: 63
End: 2023-01-19
Payer: COMMERCIAL

## 2023-01-19 VITALS
DIASTOLIC BLOOD PRESSURE: 70 MMHG | BODY MASS INDEX: 38.36 KG/M2 | OXYGEN SATURATION: 95 % | TEMPERATURE: 99.1 F | HEART RATE: 86 BPM | WEIGHT: 315 LBS | SYSTOLIC BLOOD PRESSURE: 130 MMHG | HEIGHT: 76 IN

## 2023-01-19 DIAGNOSIS — Z86.79 S/P ABLATION OF ATRIAL FIBRILLATION: ICD-10-CM

## 2023-01-19 DIAGNOSIS — G47.33 OBSTRUCTIVE SLEEP APNEA (ADULT) (PEDIATRIC): Primary | ICD-10-CM

## 2023-01-19 DIAGNOSIS — E66.01 SEVERE OBESITY WITH BODY MASS INDEX (BMI) OF 35.0 TO 39.9 WITH SERIOUS COMORBIDITY (HCC): ICD-10-CM

## 2023-01-19 DIAGNOSIS — Z98.890 S/P ABLATION OF ATRIAL FIBRILLATION: ICD-10-CM

## 2023-01-19 PROCEDURE — 99204 OFFICE O/P NEW MOD 45 MIN: CPT | Performed by: INTERNAL MEDICINE

## 2023-01-19 NOTE — PATIENT INSTRUCTIONS
217 [facility] [place]. [place], [address]  Tel.  [phone]  Fax. 100 [facility] 60  1001 [facility] Ne, [address]  Tel.  [phone]  Fax. [phone] [address]  Tel.  [phone]  Fax. [phone]     Sleep Apnea: After Your Visit  Your Care Instructions  Sleep apnea occurs when you frequently stop breathing for 10 seconds or longer during sleep. It can be mild to severe, based on the number of times per hour that you stop breathing or have slowed breathing. Blocked or narrowed airways in your nose, mouth, or throat can cause sleep apnea. Your airway can become blocked when your throat muscles and tongue relax during sleep. Sleep apnea is common, occurring in 1 out of 20 individuals. Individuals having any of the following characteristics should be evaluated and treated right away due to high risk and detrimental consequences from untreated sleep apnea:  Obesity  Congestive Heart failure  Atrial Fibrillation  Uncontrolled Hypertension  Type II Diabetes  Night-time Arrhythmias  Stroke  Pulmonary Hypertension  High-risk Driving Populations (pilots, truck drivers, etc.)  Patients Considering Weight-loss Surgery    How do you know you have sleep apnea? You probably have sleep apnea if you answer 'yes' to 3 or more of the following questions:  S - Have you been told that you Snore? T - Are you often Tired during the day? O - Has anyone Observed you stop breathing while sleeping? P- Do you have (or are being treated for) high blood Pressure? B - Are you obese (Body Mass Index > 35)? A - Is your Age 48years old or older? N - Is your Neck size greater than 16 inches? G - Are you male Gender? A sleep physician can prescribe a breathing device that prevents tissues in the throat from blocking your airway. Or your doctor may recommend using a dental device (oral breathing device) to help keep your airway open.  In some cases, surgery may be needed to remove enlarged tissues in the throat. Follow-up care is a key part of your treatment and safety. Be sure to make and go to all appointments, and call your doctor if you are having problems. It's also a good idea to know your test results and keep a list of the medicines you take. How can you care for yourself at home? Lose weight, if needed. It may reduce the number of times you stop breathing or have slowed breathing. Go to bed at the same time every night. Sleep on your side. It may stop mild apnea. If you tend to roll onto your back, sew a pocket in the back of your pa"[employer]" top. Put a tennis ball into the pocket, and stitch the pocket shut. This will help keep you from sleeping on your back. Avoid alcohol and medicines such as sleeping pills and sedatives before bed. Do not smoke. Smoking can make sleep apnea worse. If you need help quitting, talk to your doctor about stop-smoking programs and medicines. These can increase your chances of quitting for good. Prop up the head of your bed 4 to 6 inches by putting bricks under the legs of the bed. Treat breathing problems, such as a stuffy nose, caused by a cold or allergies. Use a continuous positive airway pressure (CPAP) breathing machine if lifestyle changes do not help your apnea and your doctor recommends it. The machine keeps your airway from closing when you sleep. If CPAP does not help you, ask your doctor whether you should try other breathing machines. A bilevel positive airway pressure machine has two types of air pressureâone for breathing in and one for breathing out. Another device raises or lowers air pressure as needed while you breathe. If your nose feels dry or bleeds when using one of these machines, talk with your doctor about increasing moisture in the air. A humidifier may help.   If your nose is runny or stuffy from using a breathing machine, talk with your doctor about using decongestants or a corticosteroid nasal spray.  When should you call for help? Watch closely for changes in your health, and be sure to contact your doctor if:  You still have sleep apnea even though you have made lifestyle changes. You are thinking of trying a device such as CPAP. You are having problems using a CPAP or similar machine. Where can you learn more? Go to [employer]. Enter Q014 in the search box to learn more about \"Sleep Apnea: After Your Visit. \"   © 7045-3398 Healthwise, Incorporated. Care instructions adapted under license by [employer] (which disclaims liability or warranty for this information). This care instruction is for use with your licensed healthcare professional. If you have questions about a medical condition or this instruction, always ask your healthcare professional. Kristyn De Jesusoy any warranty or liability for your use of this information. PROPER SLEEP HYGIENE    What to avoid  Do not have drinks with caffeine, such as coffee or black tea, for 8 hours before bed. Do not smoke or use other types of tobacco near bedtime. Nicotine is a stimulant and can keep you awake. Avoid drinking alcohol late in the evening, because it can cause you to wake in the middle of the night. Do not eat a big meal close to bedtime. If you are hungry, eat a light snack. Do not drink a lot of water close to bedtime, because the need to urinate may wake you up during the night. Do not read or watch TV in bed. Use the bed only for sleeping and sexual activity. What to try  Go to bed at the same time every night, and wake up at the same time every morning. Do not take naps during the day. Keep your bedroom quiet, dark, and cool. Get regular exercise, but not within 3 to 4 hours of your bedtime. .  Sleep on a comfortable pillow and mattress. If watching the clock makes you anxious, turn it facing away from you so you cannot see the time.   If you worry when you lie down, start a worry book. Well before bedtime, write down your worries, and then set the book and your concerns aside. Try meditation or other relaxation techniques before you go to bed. If you cannot fall asleep, get up and go to another room until you feel sleepy. Do something relaxing. Repeat your bedtime routine before you go to bed again. Make your house quiet and calm about an hour before bedtime. Turn down the lights, turn off the TV, log off the computer, and turn down the volume on music. This can help you relax after a busy day. Drowsy Driving  The [address] [place] [address] Road Traffic Safety Administration cites drowsiness as a causing factor in more than 604,715 police reported crashes annually, resulting in 76,000 injuries and 1,500 deaths. Other surveys suggest 55% of people polled have driven while drowsy in the past year, 23% had fallen asleep but not crashed, 3% crashed, and 2% had and accident due to drowsy driving. Who is at risk? Young Drivers: One study of drowsy driving accidents states that 55% of the drivers were under 25 years. Of those, 75% were male. Shift Workers and Travelers: People who work overnight or travel across time zones frequently are at higher risk of experiencing Circadian Rhythm Disorders. They are trying to work and function when their body is programed to sleep. Sleep Deprived: Lack of sleep has a serious impact on your ability to pay attention or focus on a task. Consistently getting less than the average of 8 hours your body needs creates partial or cumulative sleep deprivation. Untreated Sleep Disorders: Sleep Apnea, Narcolepsy, R.L.S., and other sleep disorders (untreated) prevent a person from getting enough restful sleep. This leads to excessive daytime sleepiness and increases the risk for drowsy driving accidents by up to 7 times. Medications / Alcohol: Even over the counter medications can cause drowsiness.  Medications that impair a drivers attention should have a warning label. Alcohol naturally makes you sleepy and on its own can cause accidents. Combined with excessive drowsiness its effects are amplified. Signs of Drowsy Driving:   * You don't remember driving the last few miles   * You may drift out of your ryan   * You are unable to focus and your thoughts wander   * You may yawn more often than normal   * You have difficulty keeping your eyes open / nodding off   * Missing traffic signs, speeding, or tailgating  Prevention-   Good sleep hygiene, lifestyle and behavioral choices have the most impact on drowsy driving. There is no substitute for sleep and the average person requires 8 hours nightly. If you find yourself driving drowsy, stop and sleep. Consider the sleep hygiene tips provided during your visit as well. Medication Refill Policy: Refills for all medications require 1 week advance notice. Please have your pharmacy fax a refill request. We are unable to fax, or call in \"controled substance\" medications and you will need to pick these prescriptions up from our office. Fooala Activation    Thank you for requesting access to Fooala. Please follow the instructions below to securely access and download your online medical record. Fooala allows you to send messages to your doctor, view your test results, renew your prescriptions, schedule appointments, and more. How Do I Sign Up? In your internet browser, go to [email or web address]. Click on the First Time User? Click Here link in the Sign In box. You will see the New Member Sign Up page. Enter your Fooala Access Code exactly as it appears below. You will not need to use this code after youve completed the sign-up process. If you do not sign up before the expiration date, you must request a new code. Fooala Access Code:  Activation code not generated  Current Fooala Status: Active (This is the date your Fooala access code will )    Enter the last four digits of your Social Security Number (xxxx) and Date of Birth (mm/dd/yyyy) as indicated and click Submit. You will be taken to the next sign-up page. Create a [employer] Finance ID. This will be your [employer] Finance login ID and cannot be changed, so think of one that is secure and easy to remember. Create a [employer] password. You can change your password at any time. Enter your Password Reset Question and Answer. This can be used at a later time if you forget your password. Enter your e-mail address. You will receive e-mail notification when new information is available in [address] Click Sign Up. You can now view and download portions of your medical record. Click the Travel Notes link to download a portable copy of your medical information. Additional Information    If you have questions, please call [phone]. Remember, [employer] is NOT to be used for urgent needs. For medical emergencies, dial 911.

## 2023-01-19 NOTE — Clinical Note
Thank you for the referral.  I will keep you informed of his progress.  [address], [place]

## 2023-01-19 NOTE — PROGRESS NOTES
HSAT Setup    Patient was educated on proper hookup and operation of the HSAT. Instruction forms and documentation were reviewed and signed. The patient demonstrated good understanding of the HSAT. General information regarding operations and maintenance of the device was provided. Patient was provided information on sleep apnea including coresponding risk factors and the importance of proper treatment. Follow-up appointment was made to return the HSAT. He will be contacted once the results have been reviewed. Patient was asked to contact our office for any problems regarding his home sleep test study.    #[id number]

## 2023-01-19 NOTE — PROGRESS NOTES
217 [facility]., [place]. [place], [address]  Tel.  [phone]  Fax. 100 [facility] 60  [place], 200 [address] [facility]  Tel.  [phone]  Fax. [phone] [address]   Tel.  [phone]  Fax. [phone]         Subjective:      Radha Salinas is an 63 y.o. male referred for evaluation for a sleep disorder. He complains of snoring associated with excessive daytime sleepiness, and he was advised to be evaluated for sleep apnea as a possible cause of his atrial fibrillation. Symptoms began several months ago, unchanged since that time. He usually can fall asleep in 5-20 minutes. He denies falling asleep while during conversation. Radha Salinas does not wake up frequently at night. He is not bothered by waking up too early and left unable to get back to sleep. He actually sleeps about 8 hours at night and wakes up about 2 times during the night. He does work shifts:  .   Willian Weiner indicates he does not get too little sleep at night. His bedtime is 2130. He awakens at 0630. He does take naps. He takes 2 naps a week lasting 45 min to an hour. He has the following observed behaviors: Loud snoring, Light snoring;  . Other remarks: He had a pacemaker placed a few weeks ago. He feels that this has significantly improved his shortness of breath with exertion. He is currently out of work due to his cardiac condition. He works as a  at the [employer]. [place] Sleepiness Score: 16   which reflect moderate daytime drowsiness.     Allergies   Allergen Reactions    Silvadene [Silver Sulfadiazine] Swelling         Current Outpatient Medications:     metoprolol tartrate (LOPRESSOR) 25 mg tablet, Take 0.5 Tablets by mouth two (2) times a day., Disp: 90 Tablet, Rfl: 1    dabigatran etexilate (Pradaxa) 150 mg capsule, TAKE 1 CAPSULE BY MOUTH EVERY 12 HOURS, Disp: 180 Capsule, Rfl: 1    acetaminophen (TYLENOL) 500 mg tablet, Take 1,500 mg by mouth every six (6) hours as needed for Pain (Daily). , Disp: , Rfl:      He  has a past medical history of Atrial fibrillation ([address] and Pacemaker. He  has a past surgical history that includes hx wisdom teeth extraction; dionne echo color flow (7/25/2012); ep study w/o induction (7/25/2012); ablation, atrial fibrillation  (7/25/2012); pr intracardiac electrophysiologic 3d mapping (7/25/2012); pr left heart cath by transeptal puncture (7/25/2012); echo intracardiac (7/25/2012); ins ppm/icd led dual only (8/17/2012); hx pacemaker; and pr icar catheter ablation atrioventr node function (7/16/2018). He family history includes Hypertension in his mother. He  reports that he has never smoked. He has never used smokeless tobacco. He reports that he does not drink alcohol and does not use drugs. Review of Systems:  Constitutional:  +weight gain. Eyes:  No blurred vision.   CVS:  No significant chest pain  Pulm:  some shortness of breath improved since pacemaker placed  GI:  No significant nausea or vomiting  :  No significant nocturia  Musculoskeletal:  No significant joint pain at night  Skin:  No significant rashes  Neuro:  No significant dizziness   Psych:  No active mood issues    Sleep Review of Systems: notable for no difficulty falling asleep; infrequent awakenings at night;  regular dreaming noted; no nightmares ; no early morning headaches; no memory problems; no concentration issues     Objective:   Visit Vitals  /70 (BP 1 Location: Right upper arm, BP Patient Position: Sitting, BP Cuff Size: Thigh)   Pulse 86   Temp 99.1 °F (37.3 °C) (Temporal)   Ht 6' 4\" (1.93 m)   Wt 324 lb (147 kg)   SpO2 95%   BMI 39.44 kg/m²         General:   Not in acute distress   Eyes:  Anicteric sclerae, no obvious strabismus   Nose:  No obvious nasal septum deviation    Oropharynx:   Class 3 oropharyngeal outlet, thick tongue base, enlarged and boggy uvula, low-lying soft palate, narrow tonsilo-pharyngeal pilars   Tonsils:   tonsils are present and normal   Neck:    ; 23 inches, midline trachea   Chest/Lungs:  Equal lung expansion, clear on auscultation    CVS:  Normal rate, regular rhythm; no JVD   Skin:  Warm to touch; no obvious rashes   Neuro:  No focal deficits ; no obvious tremor    Psych:  Normal affect,  normal countenance;          Assessment:       ICD-10-CM ICD-9-CM    1. Obstructive sleep apnea (adult) (pediatric)  G47.33 327.23 SLEEP STUDY UNATTENDED, 4 CHANNEL      2. Severe obesity with body mass index (BMI) of 35.0 to 39.9 with serious comorbidity (HCC)  E66.01 278.01       3. S/P ablation of atrial fibrillation  Z98.890 V45.89     Z86.79              Plan:     * The patient currently has a High Risk for having sleep apnea. STOP-BANG score 6.  * HSAT was ordered for initial evaluation. Treatment options for sleep apnea were reviewed. he would like to proceed with a trial of PAP if found to have significant apnea. * He was provided information on sleep apnea including coresponding risk factors and the importance of proper treatment. * Counseling was provided regarding proper sleep hygiene and safe driving. 2. Obesity - weight has been going up. I have discussed the relationship of weight to obstructive sleep apnea. I have advised him to start a weight loss plan. We discussed reducing portions . he understands that weight loss can reduce severity of sleep apnea and snoring. 3. Atrial fibrillation - rate controlled. he continues on his  current regimen. I have reviewed the relationship between arrhythmias and sleep disordered breathing     The treatment plan was reviewed with the patient in detail . he understands that the lead technologist will be calling him  with the results or notifying of results via [address] [place] [address] (phone call best) and assisting with the next step in the treatment plan as outlined today during the consultation with me. All of his questions were addressed.      Thank you for allowing us to participate in your patient's medical care. We'll keep you updated on these investigations.   Electronically signed by    Justine Kamara MD  Diplomate in Sleep Medicine  [facility]  1/19/2023

## 2023-01-24 ENCOUNTER — HOSPITAL ENCOUNTER (OUTPATIENT)
Dept: NON INVASIVE DIAGNOSTICS | Age: 63
Discharge: HOME | End: 2023-01-24
Attending: SPECIALIST
Payer: COMMERCIAL

## 2023-01-24 ENCOUNTER — TELEPHONE (OUTPATIENT)
Dept: SLEEP MEDICINE | Age: 63
End: 2023-01-24

## 2023-01-24 ENCOUNTER — HOSPITAL ENCOUNTER (OUTPATIENT)
Dept: NUCLEAR MEDICINE | Age: 63
Discharge: HOME | End: 2023-01-24
Attending: SPECIALIST
Payer: COMMERCIAL

## 2023-01-24 VITALS
DIASTOLIC BLOOD PRESSURE: 78 MMHG | BODY MASS INDEX: 38.36 KG/M2 | HEIGHT: 76 IN | WEIGHT: 315 LBS | SYSTOLIC BLOOD PRESSURE: 118 MMHG

## 2023-01-24 DIAGNOSIS — G47.33 OBSTRUCTIVE SLEEP APNEA (ADULT) (PEDIATRIC): Primary | ICD-10-CM

## 2023-01-24 DIAGNOSIS — R06.02 SHORTNESS OF BREATH: ICD-10-CM

## 2023-01-24 PROCEDURE — 93017 CV STRESS TEST TRACING ONLY: CPT

## 2023-01-24 PROCEDURE — 74011000250 HC RX REV CODE- 250: Performed by: SPECIALIST

## 2023-01-24 PROCEDURE — 78452 HT MUSCLE IMAGE SPECT MULT: CPT

## 2023-01-24 PROCEDURE — 74011250636 HC RX REV CODE- 250/636: Performed by: SPECIALIST

## 2023-01-24 RX ORDER — TETRAKIS(2-METHOXYISOBUTYLISOCYANIDE)COPPER(I) TETRAFLUOROBORATE 1 MG/ML
33 INJECTION, POWDER, LYOPHILIZED, FOR SOLUTION INTRAVENOUS
Status: COMPLETED | OUTPATIENT
Start: 2023-01-24 | End: 2023-01-24

## 2023-01-24 RX ORDER — SODIUM CHLORIDE 0.9 % (FLUSH) 0.9 %
5-40 SYRINGE (ML) INJECTION AS NEEDED
Status: DISCONTINUED | OUTPATIENT
Start: 2023-01-24 | End: 2023-01-28 | Stop reason: HOSPADM

## 2023-01-24 RX ADMIN — SODIUM CHLORIDE, PRESERVATIVE FREE 20 ML: 5 INJECTION INTRAVENOUS at 09:05

## 2023-01-24 RX ADMIN — TETRAKIS(2-METHOXYISOBUTYLISOCYANIDE)COPPER(I) TETRAFLUOROBORATE 33 MILLICURIE: 1 INJECTION, POWDER, LYOPHILIZED, FOR SOLUTION INTRAVENOUS at 08:55

## 2023-01-24 RX ADMIN — REGADENOSON 0.4 MG: 0.08 INJECTION, SOLUTION INTRAVENOUS at 08:52

## 2023-01-25 ENCOUNTER — APPOINTMENT (OUTPATIENT)
Dept: NON INVASIVE DIAGNOSTICS | Age: 63
End: 2023-01-25
Attending: SPECIALIST
Payer: COMMERCIAL

## 2023-01-25 ENCOUNTER — HOSPITAL ENCOUNTER (OUTPATIENT)
Dept: NUCLEAR MEDICINE | Age: 63
Discharge: HOME | End: 2023-01-25
Attending: SPECIALIST
Payer: COMMERCIAL

## 2023-01-25 LAB
STRESS BASELINE DIAS BP: 78 MMHG
STRESS BASELINE HR: 61 BPM
STRESS BASELINE ST DEPRESSION: 0 MM
STRESS BASELINE SYS BP: 118 MMHG
STRESS ESTIMATED WORKLOAD: 1 METS
STRESS EXERCISE DUR MIN: 3 MIN
STRESS EXERCISE DUR SEC: 0 SEC
STRESS PEAK DIAS BP: 78 MMHG
STRESS PEAK SYS BP: 118 MMHG
STRESS PERCENT HR ACHIEVED: 39 %
STRESS POST PEAK HR: 62 BPM
STRESS RATE PRESSURE PRODUCT: 7316 BPM*MMHG
STRESS ST DEPRESSION: 0 MM
STRESS STAGE 1 DURATION: 1 MIN:SEC
STRESS STAGE 1 HR: 61 BPM
STRESS STAGE 2 BP: NORMAL MMHG
STRESS STAGE 2 DURATION: 1 MIN:SEC
STRESS STAGE 2 HR: 62 BPM
STRESS STAGE 3 BP: NORMAL MMHG
STRESS STAGE 3 DURATION: 1 MIN:SEC
STRESS STAGE 3 HR: 61 BPM
STRESS STAGE RECOVERY 1 DURATION: 1 MIN:SEC
STRESS STAGE RECOVERY 1 HR: 61 BPM
STRESS STAGE RECOVERY 2 DURATION: 1 MIN:SEC
STRESS STAGE RECOVERY 2 HR: 60 BPM
STRESS STAGE RECOVERY 3 BP: NORMAL MMHG
STRESS STAGE RECOVERY 3 DURATION: 1 MIN:SEC
STRESS STAGE RECOVERY 3 HR: 60 BPM
STRESS STAGE RECOVERY 4 DURATION: 1 MIN:SEC
STRESS STAGE RECOVERY 4 HR: 60 BPM
STRESS TARGET HR: 160 BPM

## 2023-01-25 RX ORDER — TETRAKIS(2-METHOXYISOBUTYLISOCYANIDE)COPPER(I) TETRAFLUOROBORATE 1 MG/ML
30 INJECTION, POWDER, LYOPHILIZED, FOR SOLUTION INTRAVENOUS
Status: COMPLETED | OUTPATIENT
Start: 2023-01-25 | End: 2023-01-25

## 2023-01-25 RX ADMIN — TETRAKIS(2-METHOXYISOBUTYLISOCYANIDE)COPPER(I) TETRAFLUOROBORATE 30 MILLICURIE: 1 INJECTION, POWDER, LYOPHILIZED, FOR SOLUTION INTRAVENOUS at 08:05

## 2023-01-26 ENCOUNTER — TELEPHONE (OUTPATIENT)
Dept: CARDIOLOGY CLINIC | Age: 63
End: 2023-01-26

## 2023-01-26 NOTE — TELEPHONE ENCOUNTER
Patient is calling to follow up with Kin Lolling in regards to disability paperwork. Please Advise.     [phone]

## 2023-01-27 ENCOUNTER — DOCUMENTATION ONLY (OUTPATIENT)
Dept: SLEEP MEDICINE | Age: 63
End: 2023-01-27

## 2023-01-27 ENCOUNTER — TELEPHONE (OUTPATIENT)
Dept: CARDIOLOGY CLINIC | Age: 63
End: 2023-01-27

## 2023-01-27 NOTE — TELEPHONE ENCOUNTER
TC to pt, states he got a message this morning that we faxed his forms in. Confirmed this. No further questions.

## 2023-01-27 NOTE — PROGRESS NOTES
PAP & supply order faxed to [facility], scanned into media and letter mailed to patient to advise.

## 2023-01-27 NOTE — TELEPHONE ENCOUNTER
Results of sleep study in [employer]  Lead tech to convey results to patient  HSAT results in [employer]. Test positive for severe sleep apnea. AHI 46/hour and lowest oxygen saturation was 67%. We had discussed treatment options at initial consultation. Based on the results of the home sleep apnea test, I believe a trial of APAP would be an effective mode of therapy. APAP order attached. he should be seen in the sleep disorder center 4-6 weeks after initiating PAP therapy. Patient should call the office the day he gets set up with new PAP device so we can schedule him for an adherence/compliance visit within 31-90 days of obtaining a new device. Front staff to Order PAP and call patient and let them know which DME company they should be hearing from after results reviewed with lead support technologist.     he will need a first adherence visit.

## 2023-01-30 ENCOUNTER — OFFICE VISIT (OUTPATIENT)
Dept: SURGERY | Age: 63
End: 2023-01-30
Payer: COMMERCIAL

## 2023-01-30 VITALS
OXYGEN SATURATION: 96 % | BODY MASS INDEX: 38.36 KG/M2 | DIASTOLIC BLOOD PRESSURE: 80 MMHG | WEIGHT: 315 LBS | HEART RATE: 86 BPM | TEMPERATURE: 98 F | SYSTOLIC BLOOD PRESSURE: 129 MMHG | HEIGHT: 76 IN

## 2023-01-30 DIAGNOSIS — M62.08 DIASTASIS OF RECTUS ABDOMINIS: ICD-10-CM

## 2023-01-30 PROCEDURE — 99203 OFFICE O/P NEW LOW 30 MIN: CPT | Performed by: SURGERY

## 2023-01-30 NOTE — ASSESSMENT & PLAN NOTE
He fortunately,delfina not have a hernia, but instead a diastasis of his rectus mucsles. I discussed this with him. This does not need surgical repair. Mainstays of treatment are weight loss and exercise per limitations based of cardiovascular system. He may follow up with me as needed and if this gets any worse.

## 2023-01-30 NOTE — PROGRESS NOTES
Identified pt with two pt identifiers (name and ). Reviewed chart in preparation for visit and have obtained necessary documentation. Brandy Kitchen is a 63 y.o. male  Chief Complaint   Patient presents with    New Patient    Possible Hernia     Referred for evaluation of abdominal hernia     Visit Vitals  /80 (BP 1 Location: Left upper arm, BP Patient Position: Sitting, BP Cuff Size: Large adult)   Pulse 86   Temp 98 °F (36.7 °C) (Oral)   Ht 6' 4\" (1.93 m)   Wt 330 lb (149.7 kg)   SpO2 96%   BMI 40.17 kg/m²       1. Have you been to the ER, urgent care clinic since your last visit? Hospitalized since your last visit? No    2. Have you seen or consulted any other health care providers outside of the [address] [place] [address] since your last visit? Include any pap smears or colon screening.  No

## 2023-02-01 ENCOUNTER — OFFICE VISIT (OUTPATIENT)
Dept: CARDIOLOGY CLINIC | Age: 63
End: 2023-02-01
Payer: COMMERCIAL

## 2023-02-01 VITALS
BODY MASS INDEX: 38.36 KG/M2 | HEART RATE: 68 BPM | WEIGHT: 315 LBS | RESPIRATION RATE: 18 BRPM | DIASTOLIC BLOOD PRESSURE: 80 MMHG | SYSTOLIC BLOOD PRESSURE: 120 MMHG | OXYGEN SATURATION: 95 % | HEIGHT: 76 IN

## 2023-02-01 DIAGNOSIS — I48.19 PERSISTENT ATRIAL FIBRILLATION WITH RAPID VENTRICULAR RESPONSE (HCC): Primary | ICD-10-CM

## 2023-02-01 DIAGNOSIS — I47.1 SVT (SUPRAVENTRICULAR TACHYCARDIA) (HCC): ICD-10-CM

## 2023-02-01 PROCEDURE — 93000 ELECTROCARDIOGRAM COMPLETE: CPT | Performed by: SPECIALIST

## 2023-02-01 PROCEDURE — 99214 OFFICE O/P EST MOD 30 MIN: CPT | Performed by: SPECIALIST

## 2023-02-01 RX ORDER — LOSARTAN POTASSIUM 25 MG/1
25 TABLET ORAL DAILY
Qty: 90 TABLET | Refills: 1 | Status: SHIPPED | OUTPATIENT
Start: 2023-02-01

## 2023-02-01 RX ORDER — FUROSEMIDE 20 MG/1
20 TABLET ORAL DAILY
Qty: 90 TABLET | Refills: 1 | Status: SHIPPED | OUTPATIENT
Start: 2023-02-01

## 2023-02-01 NOTE — PROGRESS NOTES
Visit Vitals  /80   Pulse 68   Resp 18   Ht 6' 4\" (1.93 m)   Wt 328 lb (148.8 kg)   SpO2 95%   BMI 39.93 kg/m²

## 2023-02-01 NOTE — PROGRESS NOTES
[facility]                                                            OFFICE NOTE        Ina Rodrigues M.D.,[facility]   1960  [id number]    Date/Time:  2/1/20238:13 AM            SUBJECTIVE:  Is doing okay shortness of breath somewhat better still present especially when he does some activities no chest pains reported. Assessment/Plan    1. Shortness of breath: Somewhat better. Previous ER visit for shortness of breath with chest CT showing no pulmonary embolism. Status post echocardiogram in December 2022 with ejection fraction at approximately 50%. Mild MR mild to moderate pulm hypertension with right ventricular systolic pressure estimated 45 mmHg. Status post nuclear stress test December thousand 22 with no ischemia and apical infarct and ejection fractions to me to 48%. This was a discussed with the patient. We had discussed the possibly of left and right cardiac catheterization. He tells me that shortness of breath somewhat improving at this time. For now proceed with medical therapy only. Add losartan 25 mg daily and add Lasix 20 mg daily. BMP and BNP 1 week later. If shortness of breath remains an issue then left heart cath should be considered at that time. Continue same dose of Toprol at this point. Status post atrial fibrillation ablation 2012 in 2017. He has had recurrence of atrial fibrillation and eventually he underwent successful AV node ablation in 2018. Status post permanent pacemaker placement at that time as well. His electrocardiogram today reveals a likely underlying atrial fibrillation ventricular paced rhythm. He remains on Pradaxa with no untoward effects thus far.      Discussed last echo with normal ef and mild mr , no additional interventions for now     Status post generator change in the January 2023 per 3. Hypertension: None and actually blood pressure the lower limits of normal he denies any particular dizziness at this time. 4. Chest pain:atypica and typical discussed nuclear stress test with no ischemia and apical infarct of December thousand and 22. For now medical therapy and cardiac catheterization the back burner. 5. Permanent pacemaker: Closely followed by Dr. Kareem Batista.      6.  Obstructive sleep apnea: Apparently diagnosed with obstructive sleep apnea was given his CPAP. Encouraged him to use it. Arising back in approximately 6 weeks        HPI   63 y.o. male.h/o ATRIAL FIBRILLATION,  This was discovered by his pcp when patient was seen for wrist injurie. Nuclear stress test on 2/12 no ischemia or mi and EF of 51%  Echo on 1/12 normal EF no gross RWMA  24 hours holter on 2/12 afib   Dc cardioversion on 5/12 attempted several times with also the addition of corvert with no restoration of NSR  He has undergone PPM and atrial fibrillation ablation with Dr. Edilia Spain in 8/12      Admitted to [facility] on 11/16 for cp and AF with RVR  Converted with ibutilide and then started on sotalol, also [address] [place] [address] Road started      Nuclear stress test on 11/16:1. No definite evidence of ischemia and/or infarction. 2. LVEF equals 52%. Left ventricular wall motion and thickening is normal.  On 1/17 : ABLATION OF AF     Recurrent PAF on 2018 off on despite increase in medications  Eventually he underwent on 7/18 with Dr. Farah:AV frank ablation and His bundle recording with fluoroscopy. pacemaker was re-interrogated and reprogrammed before and after the ablation.      Seen in er on 12/22 for sob CTA with no PE , troponin normal and only mild elevation of probnp noted    On 1/23:left chest pacer gen change after right femoral vein temp pacing catheter placement with vascular ultrasound guidance     PMH: as above and some djd    PSH: none    FH: not significant for early CAD or SCD   SH: no etoh or tobacco, works as a [employer]              CARDIAC STUDIES        12/14/22    ECHO ADULT COMPLETE 12/15/2022 12/15/2022    Interpretation Summary    Left Ventricle: Low normal left ventricular systolic function. EF by visual approximation is 50%. EF by 2D Simpsons Biplane is 51%. Left ventricle size is normal. Moderately increased wall thickness. See diagram for wall motion findings. Right Ventricle: Not well visualized. Not well visualized. Right ventricle is mildly dilated. Lead present in the right ventricle. Mitral Valve: Mild regurgitation. Tricuspid Valve: Mildly elevated RVSP. The estimated RVSP is 45 mmHg. Left Atrium: Left atrium is mildly dilated. Right Atrium: Right atrium is mildly dilated. Signed by: Dwaine Raines MD on 12/15/2022  1:20 PM            01/24/23    NUCLEAR CARDIAC STRESS TEST 01/24/2023, 01/25/2023 1/25/2023    Interpretation Summary    ECG: Resting ECG demonstrates atrial fibrillation and ventricular pacing. ECG: Stress ECG was negative for ischemia. Stress Test: A pharmacological stress test was performed using lexiscan. Blood pressure demonstrated a normal response and heart rate demonstrated a normal response to stress. The patient's heart rate recovery was normal. The patient reported no chest pain during the stress test.    MPI: No convincing ischemia. Small apical infarct. LVEF 48%. Rest and Lexiscan myocardial perfusion SPECT imaging is performed with IV injections of 33 and 30 mCi technetium 99m Sestamibi respectively. SPECT quantitative perfusion analysis and images displayed in three planes demonstrate small apical relatively fixed thinning compatible with infarct. There is no convincing ischemia. Gated SPECT quantitative analysis and images reviewed in 3 planes exhibit mild relatively diffuse hypokinesis and diminished LV systolic wall thickening. The calculated LV ejection fraction is 48%.  Pulmonary uptake and left ventricular cavity size appear normal.    Impression  : No convincing ischemia. Small apical infarct. LVEF 48%. Signed by: Farhat Baptiste MD on 1/24/2023  1:17 PM, Signed by: Donn Macias MD on 1/25/2023 11:01 AM                    EKG Results       None                IMAGING      MRI Results (most recent):  No results found for this or any previous visit. CT Results (most recent):  Results from Hospital Encounter encounter on 12/11/22    CT CHEST W CONT    Narrative  EXAM:  CT CHEST W CONT    INDICATION:  63-year-old male with shortness of breath. COMPARISON: 12/11/2022 chest radiographs and 12/13/2016 cardiac CT. CONTRAST:  100 cc of Isovue-300. TECHNIQUE: Multislice helical CT was performed from the thoracic inlet to the  adrenal glands after the uneventful administration of intravenous contrast.  Contiguous 5 mm axial images were reconstructed and lung and soft tissue windows  were generated. Coronal and sagittal reformations were generated. CT dose  reduction was achieved through use of a standardized protocol tailored for this  examination and automatic exposure control for dose modulation. FINDINGS:  Lower neck: No thyroid nodule. Soft tissue within normal limits. Lungs/Pleura: No evidence of consolidation, pleural effusion, or pneumothorax. No suspicious pulmonary nodules. Subsegmental scarring/atelectasis in the left  lower lobe with elevated left hemidiaphragm, similar to prior study. Mediastinum: Bilateral atrial enlargement. No pericardial effusion. Left chest  wall implantable cardiac pacemaker leads extending to right atrium and right  ventricle. The main pulmonary artery and thoracic aorta caliber as are within  normal limits. No central pulmonary embolism. Bovine arch aortic branching  pattern, developmental variant. The esophagus is grossly within normal limits. No pathologic mediastinal or hilar adenopathy.     Chest wall/Soft Tissue: No mass lesion or pathologic axillary lymphadenopathy. Partially imaged left chest wall implantable cardiac pacemaker battery pack  associated metallic artifact preclude optimal evaluation of adjacent structures. Upper Abdomen: No suspicious mass lesion. Multiple left kidney cysts. Bones: No evidence of acute fracture, dislocation, or aggressive osseous  abnormality. There is mild levoscoliosis of the thoracic spine. Impression  No acute cardiopulmonary abnormality. Cardiomegaly with implantable cardiac pacemaker. XR Results (most recent):  Results from Hospital Encounter encounter on 12/11/22    XR CHEST PA LAT    Narrative  Indication:  dyspnea    Exam: PA and lateral views of the chest.    Direct comparison is made to prior CXR dated December 2018. Findings: Cardiomediastinal silhouette is within normal limits. Intact pacer  leads and right atrium and right ventricle. Lungs are clear bilaterally. Pleural  spaces are normal. Osseous structures are intact. Impression  No acute cardiopulmonary disease.           Past Medical History:   Diagnosis Date    Atrial fibrillation [facility])     Pacemaker      Past Surgical History:   Procedure Laterality Date    ABLATION, ATRIAL FIBRILLATION   07/25/2012         ECHO INTRACARDIAC  07/25/2012         EP STUDY W/O INDUCTION  07/25/2012         HX PACEMAKER  01/04/2023    HX WISDOM TEETH EXTRACTION      40 yrs ago    INS PPM/ICD LED DUAL ONLY  08/17/2012         MT ICAR CATHETER ABLATION ATRIOVENTR NODE FUNCTION  07/16/2018         MT INTRACARDIAC ELECTROPHYSIOLOGIC 3D MAPPING  07/25/2012         MT LEFT HEART CATH BY TRANSEPTAL PUNCTURE  07/25/2012         CHRISTEL ECHO COLOR FLOW  07/25/2012          Social History     Tobacco Use    Smoking status: Never    Smokeless tobacco: Never   Substance Use Topics    Alcohol use: No    Drug use: No     Family History   Problem Relation Age of Onset    Hypertension Mother      Allergies   Allergen Reactions    Silvadene [Silver Sulfadiazine] Swelling There were no vitals taken for this visit. There were no vitals filed for this visit. Review of Systems:   Pertinent items are noted in the History of Present Illness. Neck: no JVD  Heart: regularly irregular rhythm  Lungs: clear to auscultation bilaterally  Abdomen: soft, non-tender. Bowel sounds normal. No masses,  no organomegaly  Extremities: edema 1+ b/l      Current Outpatient Medications on File Prior to Visit   Medication Sig Dispense Refill    metoprolol tartrate (LOPRESSOR) 25 mg tablet Take 0.5 Tablets by mouth two (2) times a day. 90 Tablet 1    dabigatran etexilate (Pradaxa) 150 mg capsule TAKE 1 CAPSULE BY MOUTH EVERY 12 HOURS 180 Capsule 1    acetaminophen (TYLENOL) 500 mg tablet Take 1,500 mg by mouth every six (6) hours as needed for Pain (Daily). No current facility-administered medications on file prior to visit. Alem Olivo had no medications administered during this visit. Current Outpatient Medications   Medication Sig    metoprolol tartrate (LOPRESSOR) 25 mg tablet Take 0.5 Tablets by mouth two (2) times a day. dabigatran etexilate (Pradaxa) 150 mg capsule TAKE 1 CAPSULE BY MOUTH EVERY 12 HOURS    acetaminophen (TYLENOL) 500 mg tablet Take 1,500 mg by mouth every six (6) hours as needed for Pain (Daily). No current facility-administered medications for this visit.          Lab Results   Component Value Date/Time    Cholesterol, total 141 10/28/2021 11:20 AM    HDL Cholesterol 45 10/28/2021 11:20 AM    LDL, calculated 76 10/28/2021 11:20 AM    LDL, calculated 47.2 11/22/2016 04:33 AM    VLDL, calculated 20 10/28/2021 11:20 AM    VLDL, calculated 11.8 11/22/2016 04:33 AM    Triglyceride 111 10/28/2021 11:20 AM    CHOL/HDL Ratio 2.4 11/22/2016 04:33 AM       Lab Results   Component Value Date/Time    Sodium 141 12/27/2022 12:43 PM    Potassium 5.1 12/27/2022 12:43 PM    Chloride 104 12/27/2022 12:43 PM    CO2 23 12/27/2022 12:43 PM    Anion gap 4 (L) 12/11/2022 12:44 PM    Glucose 86 12/27/2022 12:43 PM    BUN 15 12/27/2022 12:43 PM    Creatinine 0.93 12/27/2022 12:43 PM    BUN/Creatinine ratio 16 12/27/2022 12:43 PM    GFR est  10/28/2021 11:20 AM    GFR est non-AA 93 10/28/2021 11:20 AM    Calcium 9.0 12/27/2022 12:43 PM       Lab Results   Component Value Date/Time    ALT (SGPT) 57 12/11/2022 12:44 PM    Alk. phosphatase 83 12/11/2022 12:44 PM    Bilirubin, total 0.5 12/11/2022 12:44 PM       Lab Results   Component Value Date/Time    WBC 7.3 12/27/2022 12:43 PM    HGB 15.0 12/27/2022 12:43 PM    HCT 44.9 12/27/2022 12:43 PM    PLATELET 984 67/40/2761 12:43 PM    MCV 94 12/27/2022 12:43 PM       Lab Results   Component Value Date/Time    TSH 1.810 10/28/2021 11:20 AM         Lab Results   Component Value Date/Time    Cholesterol, total 141 10/28/2021 11:20 AM    Cholesterol, total 100 11/22/2016 04:33 AM    HDL Cholesterol 45 10/28/2021 11:20 AM    HDL Cholesterol 41 11/22/2016 04:33 AM    LDL, calculated 76 10/28/2021 11:20 AM    LDL, calculated 47.2 11/22/2016 04:33 AM    Triglyceride 111 10/28/2021 11:20 AM    Triglyceride 59 11/22/2016 04:33 AM    CHOL/HDL Ratio 2.4 11/22/2016 04:33 AM                Please note that this dictation was completed with [employer], the [employer] voice recognition software. Quite often unanticipated grammatical, syntax, homophones, and other interpretative errors are inadvertently transcribed by the computer software. Please disregard these errors. Please excuse any errors that have escaped final proofreading.

## 2023-02-01 NOTE — PATIENT INSTRUCTIONS
Please START Losartan 25mg daily    Please START Lasix(furosemide) 20mg daily    Have lab work done in 1 week    Follow up with Dr. Wilber Lozano in about 6 weeks

## 2023-02-01 NOTE — LETTER
NOTIFICATION OF RETURN TO WORK    2/1/2023 11:19 AM    Mr. Junie Vera  [address] [address]  . To Whom It May Concern:    Junie Vera is under the care of [facility]. He is able to return to work with no restrictions. If there are questions or concerns please have the patient contact our office.     Sincerely,          Khalida Brooke MD

## 2023-02-06 NOTE — TELEPHONE ENCOUNTER
Conjuntivae and eyelids appear normal, Sclerae : White without injection Verified patient with two patient identifiers. Spoke with patient yesterday (2/7/18) around 4:30 or so and said that he is feeling better that heart rate was down. Patient at work and not able to transmit his episode. He was advised if he has anymore symptoms to use his device and send his episode

## 2023-02-09 ENCOUNTER — TELEPHONE (OUTPATIENT)
Dept: SLEEP MEDICINE | Age: 63
End: 2023-02-09

## 2023-02-09 NOTE — TELEPHONE ENCOUNTER
Received fax in media from [employer]. They are not in network with the [facility] prefix of [place].     Phoned patient and informed him of DME change to [facility].

## 2023-02-23 ENCOUNTER — OFFICE VISIT (OUTPATIENT)
Dept: INTERNAL MEDICINE CLINIC | Age: 63
End: 2023-02-23
Payer: COMMERCIAL

## 2023-02-23 VITALS
SYSTOLIC BLOOD PRESSURE: 128 MMHG | OXYGEN SATURATION: 99 % | WEIGHT: 315 LBS | TEMPERATURE: 98 F | HEART RATE: 86 BPM | BODY MASS INDEX: 38.36 KG/M2 | HEIGHT: 76 IN | RESPIRATION RATE: 16 BRPM | DIASTOLIC BLOOD PRESSURE: 66 MMHG

## 2023-02-23 DIAGNOSIS — M79.89 SWELLING OF LOWER LEG: ICD-10-CM

## 2023-02-23 DIAGNOSIS — Z95.0 PACEMAKER: Primary | ICD-10-CM

## 2023-02-23 DIAGNOSIS — G47.33 OSA (OBSTRUCTIVE SLEEP APNEA): ICD-10-CM

## 2023-02-23 DIAGNOSIS — G89.29 CHRONIC BILATERAL LOW BACK PAIN WITHOUT SCIATICA: ICD-10-CM

## 2023-02-23 DIAGNOSIS — M54.50 CHRONIC BILATERAL LOW BACK PAIN WITHOUT SCIATICA: ICD-10-CM

## 2023-02-23 PROCEDURE — 99214 OFFICE O/P EST MOD 30 MIN: CPT | Performed by: INTERNAL MEDICINE

## 2023-02-23 NOTE — PROGRESS NOTES
Health Maintenance Due   Topic Date Due    DTaP/Tdap/Td series (1 - Tdap) Never done    Colorectal Cancer Screening Combo  Never done    COVID-19 Vaccine (4 - Booster for Luong Peter series) 01/05/2022       No chief complaint on file. 1. Have you been to the ER, urgent care clinic since your last visit? Hospitalized since your last visit? No    2. Have you seen or consulted any other health care providers outside of the [address] [place] [address] since your last visit? Include any pap smears or colon screening. No    3) Do you have an Advance Directive on file? no    4) Are you interested in receiving information on Advance Directives? NO      Patient is accompanied by self I have received verbal consent from Gee Marx to discuss any/all medical information while they are present in the room.

## 2023-02-23 NOTE — PROGRESS NOTES
HISTORY OF PRESENT ILLNESS  Nolan Michel is a 63 y.o. male who presents with a complaint of low back pain. Patient reports the low back pain is a \"tight\" feeling. Rates pain level 8/10 on verbal numeric pain scale. He is taking 1000mg of Tylenol BID and using [employer], with acceptable relief of symptoms. Denies any sciatica at this time. Feels this is managing his pain and is not interested in any further interventions at this time. Patient reports he was recently diagnosed with sleep apnea and will be getting a C-PAP machine. Patient reports he is short of breath during exertion. Oxygen saturation 99% on RA. Denies any chest pain and/or palpitations. Reports he is followed by cardiology for management of his cardiac issues. Patient does have a pacemaker. Patient believes the Lasix is helping alleviate lower extremity edema. Patient reports he was recently referred to general surgery for a possible abdominal hernia, due to an abdominal bulge that is present when he sits up. He was diagnosed with diastasis of his rectus mucsles and no surgical intervention is necessary. Asked patient that it was likely weight related   Visit Vitals  /66 (BP 1 Location: Left upper arm, BP Patient Position: Sitting, BP Cuff Size: Adult)   Pulse 86   Temp 98 °F (36.7 °C) (Oral)   Resp 16   Ht 6' 4\" (1.93 m)   Wt 328 lb (148.8 kg)   SpO2 99%   BMI 39.93 kg/m²      Past Medical History:   Diagnosis Date    Atrial fibrillation [facility])     Pacemaker       Outpatient Encounter Medications as of 2/23/2023   Medication Sig Dispense Refill    losartan (COZAAR) 25 mg tablet Take 1 Tablet by mouth daily. 90 Tablet 1    furosemide (LASIX) 20 mg tablet Take 1 Tablet by mouth daily. 90 Tablet 1    metoprolol tartrate (LOPRESSOR) 25 mg tablet Take 0.5 Tablets by mouth two (2) times a day.  90 Tablet 1    dabigatran etexilate (Pradaxa) 150 mg capsule TAKE 1 CAPSULE BY MOUTH EVERY 12 HOURS 180 Capsule 1    acetaminophen (TYLENOL) 500 mg tablet Take 1,500 mg by mouth every six (6) hours as needed for Pain (Daily). No facility-administered encounter medications on file as of 2/23/2023. Review of Systems   Constitutional:  Negative for chills and fever. Respiratory:  Positive for shortness of breath. Negative for wheezing. Cardiovascular:  Positive for leg swelling. Negative for chest pain and palpitations. Gastrointestinal:  Negative for abdominal pain. Musculoskeletal:  Positive for back pain. Negative for falls. Neurological:  Negative for weakness and headaches. All other systems reviewed and are negative. Physical Exam  Vitals and nursing note reviewed. Constitutional:       Appearance: He is obese. HENT:      Head: Normocephalic. Nose: Nose normal.      Mouth/Throat:      Mouth: Mucous membranes are moist.      Pharynx: Oropharynx is clear. Eyes:      Conjunctiva/sclera: Conjunctivae normal.   Cardiovascular:      Rate and Rhythm: Normal rate and regular rhythm. Heart sounds: Normal heart sounds. Pulmonary:      Breath sounds: Normal breath sounds. Abdominal:      General: Bowel sounds are normal.   Musculoskeletal:         General: Tenderness present. Cervical back: Normal range of motion and neck supple. Comments: Reports low back pain. Tender with palpation. Skin:     General: Skin is warm and dry. Neurological:      Mental Status: He is alert and oriented to person, place, and time. Psychiatric:         Mood and Affect: Mood normal.         Behavior: Behavior normal.     ASSESSMENT and PLAN    Diagnoses and all orders for this visit:    1. Pacemaker    2. Swelling of lower leg    3. LUCY (obstructive sleep apnea)     -  CPAP has been ordered   4. Chronic bilateral low back pain without sciatica      -  gave patient an option for a muscle relaxer. He will wait       -  reviewed with patient that he needed to work on his diet. Reports a lot of processed foods.    Follow-up and Dispositions    Return in about 6 months (around 8/23/2023). reviewed diet, exercise and weight control                                                                                                                Consent For Provider Observation  [facility] (\"[facility]") has agreed to permit a provider employed by [facility] (\"Observer\") to observe care to patients treated in its physician practices. Your physician has agreed to permit such Observer to observe his/her patient care activities. Such observation will not include any direct participation in the care provided to you. This writer has obtained verbal permission from this patient to permit Observer to observe their medical care received at [facility]. This patient agrees that they have been given the opportunity to refuse to give such consent and that they may withdraw their consent at any time, except to the extent [facility] has relied on this consent and an Observer has already observed their medical care. This consent shall remain in effect for 1 year, unless otherwise withdrawn by this patient.

## 2023-02-24 LAB
BUN SERPL-MCNC: 14 MG/DL (ref 8–27)
BUN/CREAT SERPL: 15 (ref 10–24)
CALCIUM SERPL-MCNC: 9.5 MG/DL (ref 8.6–10.2)
CHLORIDE SERPL-SCNC: 104 MMOL/L (ref 96–106)
CO2 SERPL-SCNC: 26 MMOL/L (ref 20–29)
CREAT SERPL-MCNC: 0.92 MG/DL (ref 0.76–1.27)
EGFRCR SERPLBLD CKD-EPI 2021: 95 ML/MIN/1.73
GLUCOSE SERPL-MCNC: 96 MG/DL (ref 70–99)
NT-PROBNP SERPL-MCNC: 858 PG/ML (ref 0–210)
POTASSIUM SERPL-SCNC: 5 MMOL/L (ref 3.5–5.2)
SODIUM SERPL-SCNC: 142 MMOL/L (ref 134–144)

## 2023-03-02 ENCOUNTER — TELEPHONE (OUTPATIENT)
Dept: CARDIOLOGY CLINIC | Age: 63
End: 2023-03-02

## 2023-03-02 NOTE — TELEPHONE ENCOUNTER
----- Message from Graham Lucas MD sent at 3/1/2023  5:28 PM EST -----  Bmp normal probnp bordrline continue rx for now  ----- Message -----  From: Lars Cisneros Lab Results In  Sent: 2/24/2023   5:37 AM EST  To: Graham Lucas MD

## 2023-03-10 ENCOUNTER — ANCILLARY PROCEDURE (OUTPATIENT)
Dept: CARDIOLOGY CLINIC | Age: 63
End: 2023-03-10

## 2023-03-10 ENCOUNTER — OFFICE VISIT (OUTPATIENT)
Dept: CARDIOLOGY CLINIC | Age: 63
End: 2023-03-10

## 2023-03-10 VITALS
WEIGHT: 315 LBS | RESPIRATION RATE: 16 BRPM | DIASTOLIC BLOOD PRESSURE: 80 MMHG | HEART RATE: 71 BPM | OXYGEN SATURATION: 96 % | BODY MASS INDEX: 38.36 KG/M2 | HEIGHT: 76 IN | SYSTOLIC BLOOD PRESSURE: 120 MMHG

## 2023-03-10 VITALS — BODY MASS INDEX: 40.17 KG/M2 | WEIGHT: 315 LBS

## 2023-03-10 DIAGNOSIS — I48.19 PERSISTENT ATRIAL FIBRILLATION WITH RAPID VENTRICULAR RESPONSE (HCC): Primary | ICD-10-CM

## 2023-03-10 DIAGNOSIS — R06.02 SHORTNESS OF BREATH: ICD-10-CM

## 2023-03-10 RX ORDER — POTASSIUM CHLORIDE 750 MG/1
10 TABLET, EXTENDED RELEASE ORAL DAILY
Qty: 90 TABLET | Refills: 1 | Status: SHIPPED | OUTPATIENT
Start: 2023-03-10

## 2023-03-10 RX ORDER — FUROSEMIDE 40 MG/1
40 TABLET ORAL DAILY
Qty: 90 TABLET | Refills: 1 | Status: SHIPPED | OUTPATIENT
Start: 2023-03-10

## 2023-03-10 RX ORDER — METOPROLOL SUCCINATE 25 MG/1
25 TABLET, EXTENDED RELEASE ORAL
Qty: 90 TABLET | Refills: 1 | Status: SHIPPED | OUTPATIENT
Start: 2023-03-10

## 2023-03-10 NOTE — PROGRESS NOTES
[address] [facility]                                                            OFFICE NOTE        Andrew Cartagena M.D.,CLAIRECGregor [facility]   1960  [id number]    Date/Time:  3/10/611945:22 AM        Wt Readings from Last 3 Encounters:   03/10/23 330 lb (149.7 kg)   03/10/23 330 lb (149.7 kg)   02/23/23 328 lb (148.8 kg)         SUBJECTIVE:    Sob better   No cp reported  No palpitations   Still sob with some activities     Assessment/Plan  1. Shortness of breath:  Previous ER visit for shortness of breath with chest CT showing no pulmonary embolism. Status post echocardiogram in December 2022 with ejection fraction at approximately 50%. Mild MR mild to moderate pulm hypertension with right ventricular systolic pressure estimated 45 mmHg. Status post nuclear stress test December thousand 22 with no ischemia and apical infarct and ejection fractions to me to 48%. This was a discussed with the patient and his brother    We had discussed the possibly of left and right cardiac catheterization. He tells me that shortness of breath somewhat improving at this time. For now proceed with medical therapy only. Continue with losartan for now. Change metoprolol to Toprol-XL 25 mg p.o. nightly. Increase Lasix to 40 mg daily (no significant weight loss since his last office visit) and had a very low-dose of potassium at 10 mEq daily since potassium on recent BMP is at the upper limits of normal as well. Chronic BMP noted. I discussed with him the borderline ejection fraction as per last echo. Recheck limited echo at the next office visit in 2 to 3 months from now. If shortness of breath remains an issue then left heart cath should be considered at that time. Continue same dose of Toprol at this point. Status post atrial fibrillation ablation 2012 in 2017.       He has had recurrence of atrial fibrillation and eventually he underwent successful AV node ablation in 2018. Status post permanent pacemaker placement at that time as well. His electrocardiogram today reveals a likely underlying atrial fibrillation ventricular paced rhythm. He remains on Pradaxa with no untoward effects thus far. Discussed last echo with normal ef and mild mr , no additional interventions for now      Status post generator change in the January 2023 per     3. Hypertension: None and actually blood pressure the lower limits of normal he denies any particular dizziness at this time. 4. Chest pain:atypica and typical    Not recurrent chest pain at this time. Discussed nuclear stress test with no ischemia and apical infarct of December thousand and 22. For now medical therapy and cardiac catheterization the back burner. 5. Permanent pacemaker: Closely followed by Dr. Marichuy Bueno.      6.  Obstructive sleep apnea: Apparently diagnosed with obstructive sleep apnea was given his CPAP. Encouraged him to use it. EKG today once again with underlying atrial fibrillation ventricular paced rhythm. back in approximately 6 weeks with proBNP again and BMP           HPI   63 y.o. male.h/o ATRIAL FIBRILLATION,  This was discovered by his pcp when patient was seen for wrist injurie. Nuclear stress test on 2/12 no ischemia or mi and EF of 51%  Echo on 1/12 normal EF no gross RWMA  24 hours holter on 2/12 afib   Dc cardioversion on 5/12 attempted several times with also the addition of corvert with no restoration of NSR  He has undergone PPM and atrial fibrillation ablation with Dr. Ofelia Arceo in 8/12      Admitted to [facility] on 11/16 for cp and AF with RVR  Converted with ibutilide and then started on sotalol, also [address] started      Nuclear stress test on 11/16:1. No definite evidence of ischemia and/or infarction. 2. LVEF equals 52%.  Left ventricular wall motion and thickening is normal.  On 1/17 : ABLATION OF AF Recurrent PAF on 2018 off on despite increase in medications  Eventually he underwent on 7/18 with Dr. Farah:AV frank ablation and His bundle recording with fluoroscopy. pacemaker was re-interrogated and reprogrammed before and after the ablation. Seen in er on 12/22 for sob CTA with no PE , troponin normal and only mild elevation of probnp noted     On 1/23:left chest pacer gen change after right femoral vein temp pacing catheter placement with vascular ultrasound guidance     PMH: as above and some djd    PSH: none    FH: not significant for early CAD or SCD   SH: no etoh or tobacco, works as a               CARDIAC STUDIES        12/14/22    ECHO ADULT COMPLETE 12/15/2022 12/15/2022    Interpretation Summary    Left Ventricle: Low normal left ventricular systolic function. EF by visual approximation is 50%. EF by 2D Simpsons Biplane is 51%. Left ventricle size is normal. Moderately increased wall thickness. See diagram for wall motion findings. Right Ventricle: Not well visualized. Not well visualized. Right ventricle is mildly dilated. Lead present in the right ventricle. Mitral Valve: Mild regurgitation. Tricuspid Valve: Mildly elevated RVSP. The estimated RVSP is 45 mmHg. Left Atrium: Left atrium is mildly dilated. Right Atrium: Right atrium is mildly dilated. Signed by: Aysha Medellin MD on 12/15/2022  1:20 PM            01/24/23    NUCLEAR CARDIAC STRESS TEST 01/24/2023, 01/25/2023 1/25/2023    Interpretation Summary    ECG: Resting ECG demonstrates atrial fibrillation and ventricular pacing. ECG: Stress ECG was negative for ischemia. Stress Test: A pharmacological stress test was performed using lexiscan. Blood pressure demonstrated a normal response and heart rate demonstrated a normal response to stress. The patient's heart rate recovery was normal. The patient reported no chest pain during the stress test.    MPI: No convincing ischemia. Small apical infarct.  LVEF 48%.    Rest and Lexiscan myocardial perfusion SPECT imaging is performed with IV injections of 33 and 30 mCi technetium 99m Sestamibi respectively. SPECT quantitative perfusion analysis and images displayed in three planes demonstrate small apical relatively fixed thinning compatible with infarct. There is no convincing ischemia. Gated SPECT quantitative analysis and images reviewed in 3 planes exhibit mild relatively diffuse hypokinesis and diminished LV systolic wall thickening. The calculated LV ejection fraction is 48%. Pulmonary uptake and left ventricular cavity size appear normal.    Impression  : No convincing ischemia. Small apical infarct. LVEF 48%. Signed by: Jake Melendez MD on 1/24/2023  1:17 PM, Signed by: Zuhair Owen MD on 1/25/2023 11:01 AM                    EKG Results       Procedure 720 Value Units Date/Time    AMB POC EKG ROUTINE W/ 12 LEADS, INTER & REP [[id number]] Resulted: 03/10/23 0947    Order Status: Completed Updated: 03/10/23 0948                IMAGING      MRI Results (most recent):  No results found for this or any previous visit. CT Results (most recent):  Results from Hospital Encounter encounter on 12/11/22    CT CHEST W CONT    Narrative  EXAM:  CT CHEST W CONT    INDICATION:  59-year-old male with shortness of breath. COMPARISON: 12/11/2022 chest radiographs and 12/13/2016 cardiac CT. CONTRAST:  100 cc of Isovue-300. TECHNIQUE: Multislice helical CT was performed from the thoracic inlet to the  adrenal glands after the uneventful administration of intravenous contrast.  Contiguous 5 mm axial images were reconstructed and lung and soft tissue windows  were generated. Coronal and sagittal reformations were generated. CT dose  reduction was achieved through use of a standardized protocol tailored for this  examination and automatic exposure control for dose modulation. FINDINGS:  Lower neck: No thyroid nodule.  Soft tissue within normal limits. Lungs/Pleura: No evidence of consolidation, pleural effusion, or pneumothorax. No suspicious pulmonary nodules. Subsegmental scarring/atelectasis in the left  lower lobe with elevated left hemidiaphragm, similar to prior study. Mediastinum: Bilateral atrial enlargement. No pericardial effusion. Left chest  wall implantable cardiac pacemaker leads extending to right atrium and right  ventricle. The main pulmonary artery and thoracic aorta caliber as are within  normal limits. No central pulmonary embolism. Bovine arch aortic branching  pattern, developmental variant. The esophagus is grossly within normal limits. No pathologic mediastinal or hilar adenopathy. Chest wall/Soft Tissue: No mass lesion or pathologic axillary lymphadenopathy. Partially imaged left chest wall implantable cardiac pacemaker battery pack  associated metallic artifact preclude optimal evaluation of adjacent structures. Upper Abdomen: No suspicious mass lesion. Multiple left kidney cysts. Bones: No evidence of acute fracture, dislocation, or aggressive osseous  abnormality. There is mild levoscoliosis of the thoracic spine. Impression  No acute cardiopulmonary abnormality. Cardiomegaly with implantable cardiac pacemaker. XR Results (most recent):  Results from Hospital Encounter encounter on 12/11/22    XR CHEST PA LAT    Narrative  Indication:  dyspnea    Exam: PA and lateral views of the chest.    Direct comparison is made to prior CXR dated December 2018. Findings: Cardiomediastinal silhouette is within normal limits. Intact pacer  leads and right atrium and right ventricle. Lungs are clear bilaterally. Pleural  spaces are normal. Osseous structures are intact. Impression  No acute cardiopulmonary disease.           Past Medical History:   Diagnosis Date    Atrial fibrillation [facility])     Pacemaker      Past Surgical History:   Procedure Laterality Date    ABLATION, ATRIAL FIBRILLATION   07/25/2012 ECHO INTRACARDIAC  07/25/2012         EP STUDY W/O INDUCTION  07/25/2012         HX PACEMAKER  01/04/2023    HX WISDOM TEETH EXTRACTION      40 yrs ago    INS PPM/ICD LED DUAL ONLY  08/17/2012         MS ICAR CATHETER ABLATION ATRIOVENTR NODE FUNCTION  07/16/2018         MS INTRACARDIAC ELECTROPHYSIOLOGIC 3D MAPPING  07/25/2012         MS LEFT HEART CATH BY TRANSEPTAL PUNCTURE  07/25/2012         CHRISTEL ECHO COLOR FLOW  07/25/2012          Social History     Tobacco Use    Smoking status: Never    Smokeless tobacco: Never   Substance Use Topics    Alcohol use: No    Drug use: No     Family History   Problem Relation Age of Onset    Hypertension Mother      Allergies   Allergen Reactions    Silvadene [Silver Sulfadiazine] Swelling         Visit Vitals  /80   Pulse 71   Resp 16   Ht 6' 4\" (1.93 m)   Wt 330 lb (149.7 kg)   SpO2 96%   BMI 40.17 kg/m²         Last 3 Recorded Weights in this Encounter    03/10/23 0900   Weight: 330 lb (149.7 kg)            Review of Systems:   Pertinent items are noted in the History of Present Illness. Visit Vitals  /80   Pulse 71   Resp 16   Ht 6' 4\" (1.93 m)   Wt 330 lb (149.7 kg)   SpO2 96%   BMI 40.17 kg/m²     General Appearance:  Well developed, well nourished,alert and oriented x 3, and individual in no acute distress. Ears/Nose/Mouth/Throat:   Hearing grossly normal.         Neck: Supple. Chest:   Lungs clear to auscultation bilaterally. Cardiovascular:  Regular rate and rhythm, S1, S2 normal, no murmur. Abdomen:   Soft, non-tender, bowel sounds are active. Extremities: No edema bilaterally. Skin: Warm and dry. Current Outpatient Medications on File Prior to Visit   Medication Sig Dispense Refill    losartan (COZAAR) 25 mg tablet Take 1 Tablet by mouth daily. 90 Tablet 1    furosemide (LASIX) 20 mg tablet Take 1 Tablet by mouth daily.  90 Tablet 1    metoprolol tartrate (LOPRESSOR) 25 mg tablet Take 0.5 Tablets by mouth two (2) times a day. 90 Tablet 1    dabigatran etexilate (Pradaxa) 150 mg capsule TAKE 1 CAPSULE BY MOUTH EVERY 12 HOURS 180 Capsule 1    acetaminophen (TYLENOL) 500 mg tablet Take 1,500 mg by mouth every six (6) hours as needed for Pain (Daily). No current facility-administered medications on file prior to visit. Barb Varma had no medications administered during this visit. Current Outpatient Medications   Medication Sig    losartan (COZAAR) 25 mg tablet Take 1 Tablet by mouth daily. furosemide (LASIX) 20 mg tablet Take 1 Tablet by mouth daily. metoprolol tartrate (LOPRESSOR) 25 mg tablet Take 0.5 Tablets by mouth two (2) times a day. dabigatran etexilate (Pradaxa) 150 mg capsule TAKE 1 CAPSULE BY MOUTH EVERY 12 HOURS    acetaminophen (TYLENOL) 500 mg tablet Take 1,500 mg by mouth every six (6) hours as needed for Pain (Daily). No current facility-administered medications for this visit. Lab Results   Component Value Date/Time    Cholesterol, total 141 10/28/2021 11:20 AM    HDL Cholesterol 45 10/28/2021 11:20 AM    LDL, calculated 76 10/28/2021 11:20 AM    LDL, calculated 47.2 11/22/2016 04:33 AM    VLDL, calculated 20 10/28/2021 11:20 AM    VLDL, calculated 11.8 11/22/2016 04:33 AM    Triglyceride 111 10/28/2021 11:20 AM    CHOL/HDL Ratio 2.4 11/22/2016 04:33 AM       Lab Results   Component Value Date/Time    Sodium 142 02/23/2023 11:26 AM    Potassium 5.0 02/23/2023 11:26 AM    Chloride 104 02/23/2023 11:26 AM    CO2 26 02/23/2023 11:26 AM    Anion gap 4 (L) 12/11/2022 12:44 PM    Glucose 96 02/23/2023 11:26 AM    BUN 14 02/23/2023 11:26 AM    Creatinine 0.92 02/23/2023 11:26 AM    BUN/Creatinine ratio 15 02/23/2023 11:26 AM    GFR est  10/28/2021 11:20 AM    GFR est non-AA 93 10/28/2021 11:20 AM    Calcium 9.5 02/23/2023 11:26 AM       Lab Results   Component Value Date/Time    ALT (SGPT) 57 12/11/2022 12:44 PM    Alk.  phosphatase 83 12/11/2022 12:44 PM    Bilirubin, total 0.5 12/11/2022 12:44 PM       Lab Results   Component Value Date/Time    WBC 7.3 12/27/2022 12:43 PM    HGB 15.0 12/27/2022 12:43 PM    HCT 44.9 12/27/2022 12:43 PM    PLATELET 585 89/75/2287 12:43 PM    MCV 94 12/27/2022 12:43 PM       Lab Results   Component Value Date/Time    TSH 1.810 10/28/2021 11:20 AM         Lab Results   Component Value Date/Time    Cholesterol, total 141 10/28/2021 11:20 AM    Cholesterol, total 100 11/22/2016 04:33 AM    HDL Cholesterol 45 10/28/2021 11:20 AM    HDL Cholesterol 41 11/22/2016 04:33 AM    LDL, calculated 76 10/28/2021 11:20 AM    LDL, calculated 47.2 11/22/2016 04:33 AM    Triglyceride 111 10/28/2021 11:20 AM    Triglyceride 59 11/22/2016 04:33 AM    CHOL/HDL Ratio 2.4 11/22/2016 04:33 AM                Please note that this dictation was completed with Marqui, the [employer] voice recognition software. Quite often unanticipated grammatical, syntax, homophones, and other interpretative errors are inadvertently transcribed by the computer software. Please disregard these errors. Please excuse any errors that have escaped final proofreading.

## 2023-03-10 NOTE — PROGRESS NOTES
Visit Vitals  /80   Pulse 71   Resp 16   Ht 6' 4\" (1.93 m)   Wt 330 lb (149.7 kg)   SpO2 96%   BMI 40.17 kg/m²

## 2023-03-10 NOTE — PATIENT INSTRUCTIONS
Please STOP Metoprolol  Please START Toprol XL 25mg nightly   Please take Losartan in the AM  Please INCREASE Lasix to 40mg daily  Please START Potassium 10mEq daily    Please have lab work done 1 week after above changes    Follow up in 4-6 weeks with a limited echocardiogram

## 2023-03-11 LAB
ECHO AV AREA PEAK VELOCITY: 3.2 CM2
ECHO AV AREA VTI: 3.3 CM2
ECHO AV AREA/BSA PEAK VELOCITY: 1.2 CM2/M2
ECHO AV AREA/BSA VTI: 1.2 CM2/M2
ECHO AV MEAN GRADIENT: 3 MMHG
ECHO AV MEAN VELOCITY: 0.9 M/S
ECHO AV PEAK GRADIENT: 5 MMHG
ECHO AV PEAK VELOCITY: 1.1 M/S
ECHO AV VELOCITY RATIO: 0.82
ECHO AV VTI: 22 CM
ECHO EST RA PRESSURE: 8 MMHG
ECHO LA DIAMETER INDEX: 1.75 CM/M2
ECHO LA DIAMETER: 4.8 CM
ECHO LA VOL 2C: 67 ML (ref 18–58)
ECHO LA VOL 4C: 85 ML (ref 18–58)
ECHO LA VOL BP: 78 ML (ref 18–58)
ECHO LA VOL/BSA BIPLANE: 28 ML/M2 (ref 16–34)
ECHO LA VOLUME AREA LENGTH: 84 ML
ECHO LA VOLUME INDEX A2C: 24 ML/M2 (ref 16–34)
ECHO LA VOLUME INDEX A4C: 31 ML/M2 (ref 16–34)
ECHO LA VOLUME INDEX AREA LENGTH: 31 ML/M2 (ref 16–34)
ECHO LV EDV A2C: 54 ML
ECHO LV EDV A4C: 78 ML
ECHO LV EDV BP: 66 ML (ref 67–155)
ECHO LV EDV INDEX A4C: 28 ML/M2
ECHO LV EDV INDEX BP: 24 ML/M2
ECHO LV EDV NDEX A2C: 20 ML/M2
ECHO LV EJECTION FRACTION A2C: 51 %
ECHO LV EJECTION FRACTION A4C: 52 %
ECHO LV EJECTION FRACTION BIPLANE: 52 % (ref 55–100)
ECHO LV ESV A2C: 27 ML
ECHO LV ESV A4C: 38 ML
ECHO LV ESV BP: 31 ML (ref 22–58)
ECHO LV ESV INDEX A2C: 10 ML/M2
ECHO LV ESV INDEX A4C: 14 ML/M2
ECHO LV ESV INDEX BP: 11 ML/M2
ECHO LV FRACTIONAL SHORTENING: 47 % (ref 28–44)
ECHO LV INTERNAL DIMENSION DIASTOLE INDEX: 1.57 CM/M2
ECHO LV INTERNAL DIMENSION DIASTOLIC: 4.3 CM (ref 4.2–5.9)
ECHO LV INTERNAL DIMENSION SYSTOLIC INDEX: 0.84 CM/M2
ECHO LV INTERNAL DIMENSION SYSTOLIC: 2.3 CM
ECHO LV IVSD: 1.4 CM (ref 0.6–1)
ECHO LV MASS 2D: 285.5 G (ref 88–224)
ECHO LV MASS INDEX 2D: 104.2 G/M2 (ref 49–115)
ECHO LV POSTERIOR WALL DIASTOLIC: 1.8 CM (ref 0.6–1)
ECHO LV RELATIVE WALL THICKNESS RATIO: 0.84
ECHO LVOT AREA: 4.2 CM2
ECHO LVOT AV VTI INDEX: 0.81
ECHO LVOT DIAM: 2.3 CM
ECHO LVOT MEAN GRADIENT: 2 MMHG
ECHO LVOT PEAK GRADIENT: 3 MMHG
ECHO LVOT PEAK VELOCITY: 0.9 M/S
ECHO LVOT STROKE VOLUME INDEX: 27.1 ML/M2
ECHO LVOT SV: 74.3 ML
ECHO LVOT VTI: 17.9 CM
ECHO RIGHT VENTRICULAR SYSTOLIC PRESSURE (RVSP): 47 MMHG
ECHO TV REGURGITANT MAX VELOCITY: 3.12 M/S
ECHO TV REGURGITANT PEAK GRADIENT: 39 MMHG

## 2023-03-18 LAB
BUN SERPL-MCNC: 11 MG/DL (ref 8–27)
BUN/CREAT SERPL: 12 (ref 10–24)
CALCIUM SERPL-MCNC: 9.2 MG/DL (ref 8.6–10.2)
CHLORIDE SERPL-SCNC: 100 MMOL/L (ref 96–106)
CO2 SERPL-SCNC: 25 MMOL/L (ref 20–29)
CREAT SERPL-MCNC: 0.9 MG/DL (ref 0.76–1.27)
EGFRCR SERPLBLD CKD-EPI 2021: 98 ML/MIN/1.73
GLUCOSE SERPL-MCNC: 112 MG/DL (ref 70–99)
NT-PROBNP SERPL-MCNC: 692 PG/ML (ref 0–210)
POTASSIUM SERPL-SCNC: 4.4 MMOL/L (ref 3.5–5.2)
SODIUM SERPL-SCNC: 137 MMOL/L (ref 134–144)

## 2023-04-04 RX ORDER — DABIGATRAN ETEXILATE 150 MG/1
CAPSULE ORAL
Qty: 180 CAPSULE | Refills: 1 | Status: SHIPPED
Start: 2023-04-04

## 2023-04-04 NOTE — TELEPHONE ENCOUNTER
Cardiologist: Dr. Hakeem Ann    Last appt: Visit date not found  Future Appointments   Date Time Provider Jam Echevarria   4/28/2023  9:00 AM SARMAD HUTCHINSON  AMB   4/28/2023  9:40 AM MD MARITZA Moeller  AMB   5/5/2023  9:00 AM Lillian Diaz ANP CAVREY  AMB   5/5/2023  9:00 AM PACEMAKER3SARMAD AMB   5/23/2023 10:45 AM Ismael Quintero, JAKI [facility] BS AMB       Requested Prescriptions     Signed Prescriptions Disp Refills    dabigatran etexilate (PRADAXA) 150 mg capsule 180 Capsule 1     Sig: TAKE 1 CAPSULE BY MOUTH EVERY 12 HOURS     Authorizing Provider: Krystal Delatorre     Ordering User: SONY MCGUIRE         Refjoe VO per Dr. Hakeem Ann.

## 2023-04-26 ENCOUNTER — TELEPHONE (OUTPATIENT)
Dept: CARDIOLOGY CLINIC | Age: 63
End: 2023-04-26

## 2023-04-26 NOTE — TELEPHONE ENCOUNTER
Good Morning,    The Echo Dr. Del Gotti ordered was DENIED by Marek De La Torre ([facility]) because Your doctor told us that you have a fast heartbeat and unusual sound on your heart exam. Your doctor ordered an ultrasound picture of your heart. This test is needed once unless there is a change in your condition. We reviewed the notes we have. The notes show that you had this test performed for this reason in the past. The notes do not show that there is a change in your condition. Based on the information we have, this test is not medically necessary. We used [facility] [employer] Clinical Guideline titled Imaging of the Heart, Resting Transthoracic Echocardiography to make this decision. You may view this guideline at [email or web address] com/mbm-guidelines-cardiology.     If a Peer to Peer needed to be done Marek De La Torre can be reached at [phone] with a case number of [id number]    Please call the patient to CANCEL this appt     [facility]

## 2023-04-28 ENCOUNTER — OFFICE VISIT (OUTPATIENT)
Dept: CARDIOLOGY CLINIC | Age: 63
End: 2023-04-28

## 2023-04-28 ENCOUNTER — HOSPITAL ENCOUNTER (OUTPATIENT)
Dept: GENERAL RADIOLOGY | Age: 63
Discharge: HOME | End: 2023-04-28
Attending: SPECIALIST
Payer: COMMERCIAL

## 2023-04-28 VITALS
RESPIRATION RATE: 16 BRPM | BODY MASS INDEX: 39.17 KG/M2 | HEART RATE: 66 BPM | SYSTOLIC BLOOD PRESSURE: 110 MMHG | DIASTOLIC BLOOD PRESSURE: 80 MMHG | WEIGHT: 315 LBS | OXYGEN SATURATION: 97 % | HEIGHT: 75 IN

## 2023-04-28 DIAGNOSIS — R06.02 SHORTNESS OF BREATH: ICD-10-CM

## 2023-04-28 DIAGNOSIS — I48.19 PERSISTENT ATRIAL FIBRILLATION WITH RAPID VENTRICULAR RESPONSE (HCC): Primary | ICD-10-CM

## 2023-04-28 DIAGNOSIS — I48.19 PERSISTENT ATRIAL FIBRILLATION WITH RAPID VENTRICULAR RESPONSE (HCC): ICD-10-CM

## 2023-04-28 PROCEDURE — 71046 X-RAY EXAM CHEST 2 VIEWS: CPT

## 2023-04-28 RX ORDER — FUROSEMIDE 20 MG/1
20 TABLET ORAL DAILY
Qty: 90 TABLET | Refills: 1 | Status: SHIPPED | OUTPATIENT
Start: 2023-04-28

## 2023-04-28 NOTE — PROGRESS NOTES
Visit Vitals  /80   Pulse 66   Resp 16   Ht 6' 3\" (1.905 m)   Wt 324 lb (147 kg)   SpO2 97%   BMI 40.50 kg/m²

## 2023-04-28 NOTE — PATIENT INSTRUCTIONS
Please STOP Pradaxa    48 hours AFTER STOPPING Pradaxa START Eliquis 5mg twice a day    Please have lab work and chest xray in the near future    Please DECREASE Lasix(furosemide) to 20mg daily    Follow up in about 4 weeks

## 2023-04-28 NOTE — PROGRESS NOTES
[facility]                                                            OFFICE NOTE        Talib Huitron M.D.,[facility]   1960  [id number]    Date/Time:  4/28/202310:20 AM        Wt Readings from Last 3 Encounters:   04/28/23 326 lb (147.9 kg)   03/10/23 330 lb (149.7 kg)   03/10/23 330 lb (149.7 kg)         SUBJECTIVE:  Shortness of breath has been better. Now with dizziness every time he stands up. Chest pain reported. Assessment/Plan  1. Shortness of breath:  Previous ER visit for shortness of breath with chest CT showing no pulmonary embolism. Status post echocardiogram in 3/23 with ejection fraction at approximately 52%. Mild MR mild to moderate pulm hypertension with right ventricular systolic pressure estimated 47 mmHg. Status post nuclear stress test December thousand 22 with no ischemia and apical infarct and ejection fractions to me to 48%. This was a discussed with the patient and his brother    We had discussed the possibly of left and right cardiac catheterization. He tells me that shortness of breath somewhat improving at this time. For now proceed with medical therapy only. Continue with losartan for now. Changed metoprolol to Toprol-XL 25 mg p.o. nightly. Now with dizziness standing up I suspect he might have some intravascular depletion. Decrease Lasix to 20 mg daily. Proceed with CMP bnp TSH and CBC today need to make sure he is anemic. Obtain cxr    I discussed with him the borderline ejection fraction as per last echo. If shortness of breath remains an issue then left heart cath should be considered at that time. Continue same dose of Toprol at this point. Status post atrial fibrillation ablation 2012 and 2017.       He has had recurrence of atrial fibrillation and eventually he underwent successful AV node ablation in 2018. Status post permanent pacemaker placement at that time as well. His electrocardiogram today reveals a likely underlying atrial fibrillation ventricular paced rhythm. He remains on Pradaxa with no untoward effects thus far. He is asked me to change Pradaxa to Eliquis for insurance purposes. Status post generator change in the January 2023    Follow with EP as well as plan in May 2023     3. Hypertension: None and actually blood pressure the lower limits of normal he denies any particular dizziness at this time. 4. Chest pain:none     Not recurrent chest pain at this time. Discussed nuclear stress test with no ischemia and apical infarct of December thousand and 22. For now medical therapy and cardiac catheterization the back burner. 5. Permanent pacemaker: Closely followed by Dr. Mukul Amin.      6.  Obstructive sleep apnea: Apparently diagnosed with obstructive sleep apnea was given his CPAP. Encouraged him to use it.       back in approximately in 4 weeks  . HPI   63 y.o. male.h/o ATRIAL FIBRILLATION,  This was discovered by his pcp when patient was seen for wrist injurie. Nuclear stress test on 2/12 no ischemia or mi and EF of 51%  Echo on 1/12 normal EF no gross RWMA  24 hours holter on 2/12 afib   Dc cardioversion on 5/12 attempted several times with also the addition of corvert with no restoration of NSR  He has undergone PPM and atrial fibrillation ablation with Dr. Faina Walden in 8/12      Admitted to [facility] on 11/16 for cp and AF with RVR  Converted with ibutilide and then started on sotalol, also [address] [place] [address] started      Nuclear stress test on 11/16:1. No definite evidence of ischemia and/or infarction. 2. LVEF equals 52%. Left ventricular wall motion and thickening is normal.  On 1/17 : ABLATION OF AF     Recurrent PAF on 2018 off on despite increase in medications  Eventually he underwent on 7/18 with Dr. Farah:AV frank ablation and His bundle recording with fluoroscopy.   pacemaker was re-interrogated and reprogrammed before and after the ablation. Seen in er on 12/22 for sob CTA with no PE , troponin normal and only mild elevation of probnp noted     On 1/23:left chest pacer gen change after right femoral vein temp pacing catheter placement with vascular ultrasound guidance     PMH: as above and some djd    PSH: none    FH: not significant for early CAD or SCD   SH: no etoh or tobacco, works as a                  CARDIAC STUDIES        03/10/23    ECHO ADULT FOLLOW-UP OR LIMITED 03/11/2023 3/11/2023    Interpretation Summary    Left Ventricle: Low normal left ventricular systolic function. EF by 2D Simpsons Biplane is 52%. Left ventricle size is normal. Increased wall thickness. Findings consistent with moderate concentric hypertrophy. Normal wall motion. Normal diastolic function. Aortic Valve: Tricuspid valve. Mitral Valve: Mild regurgitation. Tricuspid Valve: The estimated RVSP is 47 mmHg. Left Atrium: Left atrium is dilated. Technical qualifiers: Echo study was technically difficult due to patient's body habitus. Signed by: Julita Garza MD on 3/11/2023 10:31 AM            01/24/23    NUCLEAR CARDIAC STRESS TEST 01/24/2023, 01/25/2023 1/25/2023    Interpretation Summary    ECG: Resting ECG demonstrates atrial fibrillation and ventricular pacing. ECG: Stress ECG was negative for ischemia. Stress Test: A pharmacological stress test was performed using lexiscan. Blood pressure demonstrated a normal response and heart rate demonstrated a normal response to stress. The patient's heart rate recovery was normal. The patient reported no chest pain during the stress test.    MPI: No convincing ischemia. Small apical infarct. LVEF 48%. Rest and Lexiscan myocardial perfusion SPECT imaging is performed with IV injections of 33 and 30 mCi technetium 99m Sestamibi respectively.     SPECT quantitative perfusion analysis and images displayed in three planes demonstrate small apical relatively fixed thinning compatible with infarct. There is no convincing ischemia. Gated SPECT quantitative analysis and images reviewed in 3 planes exhibit mild relatively diffuse hypokinesis and diminished LV systolic wall thickening. The calculated LV ejection fraction is 48%. Pulmonary uptake and left ventricular cavity size appear normal.    Impression  : No convincing ischemia. Small apical infarct. LVEF 48%. Signed by: Unique Zacarias MD on 1/24/2023  1:17 PM, Signed by: Joon Ellis MD on 1/25/2023 11:01 AM                    EKG Results       Procedure 720 Value Units Date/Time    AMB POC EKG ROUTINE W/ 12 LEADS, INTER & REP [[id number]] Resulted: 04/28/23 0951    Order Status: Completed Updated: 04/28/23 0955                IMAGING      MRI Results (most recent):  No results found for this or any previous visit. CT Results (most recent):  Results from Hospital Encounter encounter on 12/11/22    CT CHEST W CONT    Narrative  EXAM:  CT CHEST W CONT    INDICATION:  63-year-old male with shortness of breath. COMPARISON: 12/11/2022 chest radiographs and 12/13/2016 cardiac CT. CONTRAST:  100 cc of Isovue-300. TECHNIQUE: Multislice helical CT was performed from the thoracic inlet to the  adrenal glands after the uneventful administration of intravenous contrast.  Contiguous 5 mm axial images were reconstructed and lung and soft tissue windows  were generated. Coronal and sagittal reformations were generated. CT dose  reduction was achieved through use of a standardized protocol tailored for this  examination and automatic exposure control for dose modulation. FINDINGS:  Lower neck: No thyroid nodule. Soft tissue within normal limits. Lungs/Pleura: No evidence of consolidation, pleural effusion, or pneumothorax. No suspicious pulmonary nodules.  Subsegmental scarring/atelectasis in the left  lower lobe with elevated left hemidiaphragm, similar to prior study.    Mediastinum: Bilateral atrial enlargement. No pericardial effusion. Left chest  wall implantable cardiac pacemaker leads extending to right atrium and right  ventricle. The main pulmonary artery and thoracic aorta caliber as are within  normal limits. No central pulmonary embolism. Bovine arch aortic branching  pattern, developmental variant. The esophagus is grossly within normal limits. No pathologic mediastinal or hilar adenopathy. Chest wall/Soft Tissue: No mass lesion or pathologic axillary lymphadenopathy. Partially imaged left chest wall implantable cardiac pacemaker battery pack  associated metallic artifact preclude optimal evaluation of adjacent structures. Upper Abdomen: No suspicious mass lesion. Multiple left kidney cysts. Bones: No evidence of acute fracture, dislocation, or aggressive osseous  abnormality. There is mild levoscoliosis of the thoracic spine. Impression  No acute cardiopulmonary abnormality. Cardiomegaly with implantable cardiac pacemaker. XR Results (most recent):  Results from Hospital Encounter encounter on 12/11/22    XR CHEST PA LAT    Narrative  Indication:  dyspnea    Exam: PA and lateral views of the chest.    Direct comparison is made to prior CXR dated December 2018. Findings: Cardiomediastinal silhouette is within normal limits. Intact pacer  leads and right atrium and right ventricle. Lungs are clear bilaterally. Pleural  spaces are normal. Osseous structures are intact. Impression  No acute cardiopulmonary disease.           Past Medical History:   Diagnosis Date    Atrial fibrillation [facility])     Pacemaker      Past Surgical History:   Procedure Laterality Date    ABLATION, ATRIAL FIBRILLATION   07/25/2012         ECHO INTRACARDIAC  07/25/2012         EP STUDY W/O INDUCTION  07/25/2012         HX PACEMAKER  01/04/2023    HX WISDOM TEETH EXTRACTION      40 yrs ago    INS PPM/ICD LED DUAL ONLY  08/17/2012         UT ICAR CATHETER ABLATION ATRIOVENTR NODE FUNCTION  07/16/2018         FL INTRACARDIAC ELECTROPHYSIOLOGIC 3D MAPPING  07/25/2012         FL LEFT HEART CATH BY TRANSEPTAL PUNCTURE  07/25/2012         CHRISTEL ECHO COLOR FLOW  07/25/2012          Social History     Tobacco Use    Smoking status: Never    Smokeless tobacco: Never   Substance Use Topics    Alcohol use: No    Drug use: No     Family History   Problem Relation Age of Onset    Hypertension Mother      Allergies   Allergen Reactions    Silvadene [Silver Sulfadiazine] Swelling         Visit Vitals  /80   Pulse 66   Resp 16   Ht 6' 3\" (1.905 m)   Wt 326 lb (147.9 kg)   SpO2 97%   BMI 40.75 kg/m²         Last 3 Recorded Weights in this Encounter    04/28/23 0947   Weight: 326 lb (147.9 kg)            Review of Systems:   Pertinent items are noted in the History of Present Illness. Visit Vitals  /80   Pulse 66   Resp 16   Ht 6' 3\" (1.905 m)   Wt 326 lb (147.9 kg)   SpO2 97%   BMI 40.75 kg/m²     General Appearance:  Well developed, well nourished,alert and oriented x 3, and individual in no acute distress. Ears/Nose/Mouth/Throat:   Hearing grossly normal.         Neck: Supple. Chest:   Lungs clear to auscultation bilaterally. Cardiovascular:  Regular rate and rhythm, S1, S2 normal, no murmur. Abdomen:   Soft, non-tender, bowel sounds are active. Extremities: No edema bilaterally. Skin: Warm and dry. Current Outpatient Medications on File Prior to Visit   Medication Sig Dispense Refill    dabigatran etexilate (PRADAXA) 150 mg capsule TAKE 1 CAPSULE BY MOUTH EVERY 12 HOURS 180 Capsule 1    furosemide (LASIX) 40 mg tablet Take 1 Tablet by mouth daily. 90 Tablet 1    potassium chloride (KLOR-CON M10) 10 mEq tablet Take 1 Tablet by mouth daily. 90 Tablet 1    metoprolol succinate (TOPROL-XL) 25 mg XL tablet Take 1 Tablet by mouth nightly. 90 Tablet 1    losartan (COZAAR) 25 mg tablet Take 1 Tablet by mouth daily.  90 Tablet 1    acetaminophen (TYLENOL) 500 mg tablet Take 3 Tablets by mouth every six (6) hours as needed for Pain (Daily). No current facility-administered medications on file prior to visit. Surya Hernandez had no medications administered during this visit. Current Outpatient Medications   Medication Sig    dabigatran etexilate (PRADAXA) 150 mg capsule TAKE 1 CAPSULE BY MOUTH EVERY 12 HOURS    furosemide (LASIX) 40 mg tablet Take 1 Tablet by mouth daily. potassium chloride (KLOR-CON M10) 10 mEq tablet Take 1 Tablet by mouth daily. metoprolol succinate (TOPROL-XL) 25 mg XL tablet Take 1 Tablet by mouth nightly. losartan (COZAAR) 25 mg tablet Take 1 Tablet by mouth daily. acetaminophen (TYLENOL) 500 mg tablet Take 3 Tablets by mouth every six (6) hours as needed for Pain (Daily). No current facility-administered medications for this visit. Lab Results   Component Value Date/Time    Cholesterol, total 141 10/28/2021 11:20 AM    HDL Cholesterol 45 10/28/2021 11:20 AM    LDL, calculated 76 10/28/2021 11:20 AM    LDL, calculated 47.2 11/22/2016 04:33 AM    VLDL, calculated 20 10/28/2021 11:20 AM    VLDL, calculated 11.8 11/22/2016 04:33 AM    Triglyceride 111 10/28/2021 11:20 AM    CHOL/HDL Ratio 2.4 11/22/2016 04:33 AM       Lab Results   Component Value Date/Time    Sodium 137 03/17/2023 11:20 AM    Potassium 4.4 03/17/2023 11:20 AM    Chloride 100 03/17/2023 11:20 AM    CO2 25 03/17/2023 11:20 AM    Anion gap 4 (L) 12/11/2022 12:44 PM    Glucose 112 (H) 03/17/2023 11:20 AM    BUN 11 03/17/2023 11:20 AM    Creatinine 0.90 03/17/2023 11:20 AM    BUN/Creatinine ratio 12 03/17/2023 11:20 AM    GFR est  10/28/2021 11:20 AM    GFR est non-AA 93 10/28/2021 11:20 AM    Calcium 9.2 03/17/2023 11:20 AM       Lab Results   Component Value Date/Time    ALT (SGPT) 57 12/11/2022 12:44 PM    Alk.  phosphatase 83 12/11/2022 12:44 PM    Bilirubin, total 0.5 12/11/2022 12:44 PM       Lab Results   Component Value Date/Time    WBC 7.3 12/27/2022 12:43 PM    HGB 15.0 12/27/2022 12:43 PM    HCT 44.9 12/27/2022 12:43 PM    PLATELET 704 29/95/0642 12:43 PM    MCV 94 12/27/2022 12:43 PM       Lab Results   Component Value Date/Time    TSH 1.810 10/28/2021 11:20 AM         Lab Results   Component Value Date/Time    Cholesterol, total 141 10/28/2021 11:20 AM    Cholesterol, total 100 11/22/2016 04:33 AM    HDL Cholesterol 45 10/28/2021 11:20 AM    HDL Cholesterol 41 11/22/2016 04:33 AM    LDL, calculated 76 10/28/2021 11:20 AM    LDL, calculated 47.2 11/22/2016 04:33 AM    Triglyceride 111 10/28/2021 11:20 AM    Triglyceride 59 11/22/2016 04:33 AM    CHOL/HDL Ratio 2.4 11/22/2016 04:33 AM                Please note that this dictation was completed with Construct, the [employer] voice recognition software. Quite often unanticipated grammatical, syntax, homophones, and other interpretative errors are inadvertently transcribed by the computer software. Please disregard these errors. Please excuse any errors that have escaped final proofreading.

## 2023-04-28 NOTE — TELEPHONE ENCOUNTER
TC to pt, ID verified. Echo denied by insurance. Pt would like to keep aptt with Dr. Candy Chen. No further questions.

## 2023-04-29 LAB
ALBUMIN SERPL-MCNC: 3.6 G/DL (ref 3.5–5)
ALBUMIN/GLOB SERPL: 1.1 (ref 1.1–2.2)
ALP SERPL-CCNC: 82 U/L (ref 45–117)
ALT SERPL-CCNC: 31 U/L (ref 12–78)
ANION GAP SERPL CALC-SCNC: 5 MMOL/L (ref 5–15)
AST SERPL-CCNC: 26 U/L (ref 15–37)
BASOPHILS # BLD: 0 K/UL (ref 0–0.1)
BASOPHILS NFR BLD: 1 % (ref 0–1)
BILIRUB SERPL-MCNC: 0.5 MG/DL (ref 0.2–1)
BNP SERPL-MCNC: 313 PG/ML
BUN SERPL-MCNC: 12 MG/DL (ref 6–20)
BUN/CREAT SERPL: 13 (ref 12–20)
CALCIUM SERPL-MCNC: 9.2 MG/DL (ref 8.5–10.1)
CHLORIDE SERPL-SCNC: 106 MMOL/L (ref 97–108)
CO2 SERPL-SCNC: 29 MMOL/L (ref 21–32)
CREAT SERPL-MCNC: 0.92 MG/DL (ref 0.7–1.3)
DIFFERENTIAL METHOD BLD: NORMAL
EOSINOPHIL # BLD: 0.1 K/UL (ref 0–0.4)
EOSINOPHIL NFR BLD: 1 % (ref 0–7)
ERYTHROCYTE [DISTWIDTH] IN BLOOD BY AUTOMATED COUNT: 12.7 % (ref 11.5–14.5)
GLOBULIN SER CALC-MCNC: 3.2 G/DL (ref 2–4)
GLUCOSE SERPL-MCNC: 106 MG/DL (ref 65–100)
HCT VFR BLD AUTO: 44.7 % (ref 36.6–50.3)
HGB BLD-MCNC: 14.4 G/DL (ref 12.1–17)
IMM GRANULOCYTES # BLD AUTO: 0 K/UL (ref 0–0.04)
IMM GRANULOCYTES NFR BLD AUTO: 0 % (ref 0–0.5)
LYMPHOCYTES # BLD: 1.9 K/UL (ref 0.8–3.5)
LYMPHOCYTES NFR BLD: 28 % (ref 12–49)
MCH RBC QN AUTO: 31.6 PG (ref 26–34)
MCHC RBC AUTO-ENTMCNC: 32.2 G/DL (ref 30–36.5)
MCV RBC AUTO: 98 FL (ref 80–99)
MONOCYTES # BLD: 0.7 K/UL (ref 0–1)
MONOCYTES NFR BLD: 10 % (ref 5–13)
NEUTS SEG # BLD: 4.2 K/UL (ref 1.8–8)
NEUTS SEG NFR BLD: 60 % (ref 32–75)
NRBC # BLD: 0 K/UL (ref 0–0.01)
NRBC BLD-RTO: 0 PER 100 WBC
PLATELET # BLD AUTO: 221 K/UL (ref 150–400)
PMV BLD AUTO: 9.8 FL (ref 8.9–12.9)
POTASSIUM SERPL-SCNC: 4.5 MMOL/L (ref 3.5–5.1)
PROT SERPL-MCNC: 6.8 G/DL (ref 6.4–8.2)
RBC # BLD AUTO: 4.56 M/UL (ref 4.1–5.7)
SODIUM SERPL-SCNC: 140 MMOL/L (ref 136–145)
TSH SERPL DL<=0.05 MIU/L-ACNC: 0.94 UIU/ML (ref 0.36–3.74)
WBC # BLD AUTO: 7 K/UL (ref 4.1–11.1)

## 2023-05-01 PROBLEM — Z79.01 ANTICOAGULATED: Status: ACTIVE | Noted: 2023-05-01

## 2023-05-05 ENCOUNTER — CLINICAL SUPPORT (OUTPATIENT)
Dept: CARDIOLOGY CLINIC | Age: 63
End: 2023-05-05

## 2023-05-05 ENCOUNTER — OFFICE VISIT (OUTPATIENT)
Dept: CARDIOLOGY CLINIC | Age: 63
End: 2023-05-05

## 2023-05-05 VITALS
HEART RATE: 85 BPM | BODY MASS INDEX: 39.17 KG/M2 | SYSTOLIC BLOOD PRESSURE: 120 MMHG | OXYGEN SATURATION: 97 % | WEIGHT: 315 LBS | HEIGHT: 75 IN | RESPIRATION RATE: 16 BRPM | DIASTOLIC BLOOD PRESSURE: 70 MMHG

## 2023-05-05 DIAGNOSIS — Z95.0 CARDIAC PACEMAKER IN SITU: Primary | ICD-10-CM

## 2023-05-05 DIAGNOSIS — Z98.890 S/P ABLATION OF ATRIAL FIBRILLATION: ICD-10-CM

## 2023-05-05 DIAGNOSIS — Z86.79 S/P ABLATION OF ATRIAL FIBRILLATION: ICD-10-CM

## 2023-05-05 DIAGNOSIS — Z95.0 CARDIAC PACEMAKER IN SITU: ICD-10-CM

## 2023-05-05 DIAGNOSIS — Z79.01 ANTICOAGULATED: ICD-10-CM

## 2023-05-05 DIAGNOSIS — I49.5 SICK SINUS SYNDROME (HCC): Primary | ICD-10-CM

## 2023-05-05 DIAGNOSIS — I48.0 PAF (PAROXYSMAL ATRIAL FIBRILLATION) (HCC): ICD-10-CM

## 2023-05-20 SDOH — ECONOMIC STABILITY: INCOME INSECURITY: HOW HARD IS IT FOR YOU TO PAY FOR THE VERY BASICS LIKE FOOD, HOUSING, MEDICAL CARE, AND HEATING?: NOT VERY HARD

## 2023-05-20 SDOH — ECONOMIC STABILITY: TRANSPORTATION INSECURITY
IN THE PAST 12 MONTHS, HAS LACK OF TRANSPORTATION KEPT YOU FROM MEETINGS, WORK, OR FROM GETTING THINGS NEEDED FOR DAILY LIVING?: NO

## 2023-05-20 SDOH — ECONOMIC STABILITY: FOOD INSECURITY: WITHIN THE PAST 12 MONTHS, YOU WORRIED THAT YOUR FOOD WOULD RUN OUT BEFORE YOU GOT MONEY TO BUY MORE.: NEVER TRUE

## 2023-05-20 SDOH — ECONOMIC STABILITY: HOUSING INSECURITY
IN THE LAST 12 MONTHS, WAS THERE A TIME WHEN YOU DID NOT HAVE A STEADY PLACE TO SLEEP OR SLEPT IN A SHELTER (INCLUDING NOW)?: NO

## 2023-05-20 SDOH — ECONOMIC STABILITY: FOOD INSECURITY: WITHIN THE PAST 12 MONTHS, THE FOOD YOU BOUGHT JUST DIDN'T LAST AND YOU DIDN'T HAVE MONEY TO GET MORE.: NEVER TRUE

## 2023-05-22 RX ORDER — METOPROLOL SUCCINATE 25 MG/1
1 TABLET, EXTENDED RELEASE ORAL NIGHTLY
COMMUNITY
Start: 2023-03-10

## 2023-05-22 RX ORDER — FUROSEMIDE 20 MG/1
1 TABLET ORAL DAILY
COMMUNITY
Start: 2023-04-28

## 2023-05-22 RX ORDER — LOSARTAN POTASSIUM 25 MG/1
1 TABLET ORAL DAILY
COMMUNITY
Start: 2023-02-01 | End: 2023-07-18

## 2023-05-22 RX ORDER — POTASSIUM CHLORIDE 750 MG/1
1 TABLET, EXTENDED RELEASE ORAL DAILY
COMMUNITY
Start: 2023-03-10

## 2023-05-22 RX ORDER — ACETAMINOPHEN 500 MG
TABLET ORAL EVERY 6 HOURS PRN
COMMUNITY

## 2023-05-23 ENCOUNTER — OFFICE VISIT (OUTPATIENT)
Age: 63
End: 2023-05-23
Payer: COMMERCIAL

## 2023-05-23 VITALS
DIASTOLIC BLOOD PRESSURE: 80 MMHG | SYSTOLIC BLOOD PRESSURE: 133 MMHG | HEIGHT: 75 IN | OXYGEN SATURATION: 95 % | BODY MASS INDEX: 39.17 KG/M2 | RESPIRATION RATE: 18 BRPM | TEMPERATURE: 98 F | WEIGHT: 315 LBS | HEART RATE: 66 BPM

## 2023-05-23 DIAGNOSIS — I50.22 CHRONIC SYSTOLIC (CONGESTIVE) HEART FAILURE (HCC): ICD-10-CM

## 2023-05-23 DIAGNOSIS — Z95.0 PACEMAKER: ICD-10-CM

## 2023-05-23 DIAGNOSIS — M54.50 CHRONIC MIDLINE LOW BACK PAIN WITHOUT SCIATICA: Primary | ICD-10-CM

## 2023-05-23 DIAGNOSIS — E66.01 MORBID (SEVERE) OBESITY DUE TO EXCESS CALORIES (HCC): ICD-10-CM

## 2023-05-23 DIAGNOSIS — G47.33 OBSTRUCTIVE SLEEP APNEA (ADULT) (PEDIATRIC): ICD-10-CM

## 2023-05-23 DIAGNOSIS — G89.29 CHRONIC MIDLINE LOW BACK PAIN WITHOUT SCIATICA: Primary | ICD-10-CM

## 2023-05-23 PROCEDURE — 99214 OFFICE O/P EST MOD 30 MIN: CPT | Performed by: INTERNAL MEDICINE

## 2023-05-23 RX ORDER — CYCLOBENZAPRINE HCL 10 MG
10 TABLET ORAL NIGHTLY PRN
Qty: 30 TABLET | Refills: 1 | Status: SHIPPED | OUTPATIENT
Start: 2023-05-23 | End: 2023-07-22

## 2023-05-23 SDOH — ECONOMIC STABILITY: INCOME INSECURITY

## 2023-05-23 SDOH — ECONOMIC STABILITY: HOUSING INSECURITY

## 2023-05-23 SDOH — ECONOMIC STABILITY: FOOD INSECURITY

## 2023-05-23 ASSESSMENT — ENCOUNTER SYMPTOMS
BACK PAIN: 1
APNEA: 0
SHORTNESS OF BREATH: 1
GASTROINTESTINAL NEGATIVE: 1
CHEST TIGHTNESS: 0

## 2023-05-23 ASSESSMENT — ANXIETY QUESTIONNAIRES
2. NOT BEING ABLE TO STOP OR CONTROL WORRYING: 0
4. TROUBLE RELAXING: 0
6. BECOMING EASILY ANNOYED OR IRRITABLE: 0
5. BEING SO RESTLESS THAT IT IS HARD TO SIT STILL: 0
7. FEELING AFRAID AS IF SOMETHING AWFUL MIGHT HAPPEN: 0
3. WORRYING TOO MUCH ABOUT DIFFERENT THINGS: 0
1. FEELING NERVOUS, ANXIOUS, OR ON EDGE: 0
GAD7 TOTAL SCORE: 0

## 2023-05-23 ASSESSMENT — PATIENT HEALTH QUESTIONNAIRE - PHQ9
1. LITTLE INTEREST OR PLEASURE IN DOING THINGS: 0
SUM OF ALL RESPONSES TO PHQ QUESTIONS 1-9: 0
2. FEELING DOWN, DEPRESSED OR HOPELESS: 0
SUM OF ALL RESPONSES TO PHQ QUESTIONS 1-9: 0
SUM OF ALL RESPONSES TO PHQ9 QUESTIONS 1 & 2: 0

## 2023-05-23 NOTE — PROGRESS NOTES
Rm    Chief Complaint   Patient presents with    Memory Loss        BP (!) 140/76 (Site: Left Upper Arm, Position: Sitting, Cuff Size: Large Adult)   Pulse 66   Temp 98 °F (36.7 °C) (Temporal)   Resp 18   Ht 6' 3\" (1.905 m)   Wt (!) 328 lb 6.4 oz (149 kg)   SpO2 95%   BMI 41.05 kg/m²      1. Have you been to the ER, urgent care clinic since your last visit? Hospitalized since your last visit? no    2. Have you seen or consulted any other health care providers outside of the [address] [place] [address] since your last visit? Include any pap smears or colon screening. no    Health Maintenance Due   Topic Date Due    HIV screen  Never done    DTaP/Tdap/Td vaccine (1 - Tdap) Never done    Diabetes screen  Never done    Colorectal Cancer Screen  Never done    COVID-19 Vaccine (4 - Booster for Pfizer series) 01/05/2022        No flowsheet data found. No flowsheet data found. No flowsheet data found.

## 2023-05-23 NOTE — PROGRESS NOTES
Bruna Shepherd is a 63 y.o. male who presents today for the following: patient was seen for his follow up. Reports that he is doing ok. Has a follow up with cardiology this week. Continues to have SOB on exertion. No CP or swelling. Had a chest xray done in the last few weeks. This was reviewed and stable. Has a history of pacemaker and SVT. ECHO was 52% and stress test was done in 12/22. Cardiac cath was suggested, but patient opted to medical treatment right now    Has ongoing back pain to the mid back. No falls, but uses a cane for support. Does not radiate. No numbness and tingling. Has been taking tylenol to help. Describes this at tight. Is not driving. Remains uses his CPAP machine. This does help with this breathing. Had blood work done in the last few months. This with CMP, troponin, Bnp,CBC and TSH reviewed. Chief Complaint   Patient presents with    Memory Loss           PMH/PSH/Allergies/Social History/medication list and most recent studies reviewed with patient. reports that he has never smoked. He has never used smokeless tobacco.    reports no history of alcohol use.      Vitals:    05/23/23 1227   BP: 133/80   Pulse:    Resp:    Temp:    SpO2:       Past Medical History:   Diagnosis Date    Atrial fibrillation (HCC)     Pacemaker        Allergies   Allergen Reactions    Silver Sulfadiazine Swelling        Current Outpatient Medications on File Prior to Visit   Medication Sig Dispense Refill    acetaminophen (TYLENOL) 500 MG tablet Take by mouth every 6 hours as needed      apixaban (ELIQUIS) 5 MG TABS tablet Take by mouth 2 times daily      furosemide (LASIX) 20 MG tablet Take 1 tablet by mouth daily      losartan (COZAAR) 25 MG tablet Take 1 tablet by mouth daily      metoprolol succinate (TOPROL XL) 25 MG extended release tablet Take 1 tablet by mouth nightly      potassium chloride (KLOR-CON M) 10 MEQ extended release tablet Take 1 tablet by mouth daily       No

## 2023-05-26 ENCOUNTER — OFFICE VISIT (OUTPATIENT)
Age: 63
End: 2023-05-26
Payer: COMMERCIAL

## 2023-05-26 VITALS
HEIGHT: 75 IN | DIASTOLIC BLOOD PRESSURE: 70 MMHG | BODY MASS INDEX: 39.04 KG/M2 | OXYGEN SATURATION: 96 % | HEART RATE: 65 BPM | RESPIRATION RATE: 13 BRPM | SYSTOLIC BLOOD PRESSURE: 110 MMHG | WEIGHT: 314 LBS

## 2023-05-26 DIAGNOSIS — I48.0 PAROXYSMAL ATRIAL FIBRILLATION (HCC): Primary | ICD-10-CM

## 2023-05-26 PROCEDURE — 93000 ELECTROCARDIOGRAM COMPLETE: CPT | Performed by: SPECIALIST

## 2023-05-26 PROCEDURE — 99214 OFFICE O/P EST MOD 30 MIN: CPT | Performed by: SPECIALIST

## 2023-05-26 NOTE — PROGRESS NOTES
within normal limits. Intact pacer  leads extend to the right atrium and right ventricle. Lungs are clear  bilaterally. Pleural spaces are normal. Osseous structures are intact. Impression  No acute cardiopulmonary disease. Past Medical History:   Diagnosis Date    Atrial fibrillation [facility])     Pacemaker      Past Surgical History:   Procedure Laterality Date    ABLATION, ATRIAL FIBRILLATION  07/25/2012         CARDIAC SURGERY  08/17/2012         ECHO INTRACARDIAC  07/25/2012         EP STUDY W/O INDUCTION  07/25/2012         ICAR CATHETER ABLATION ATRIOVENTR NODE FUNCTION  07/16/2018         INTRACARDIAC ELECTROPHYSIOLOGIC 3D MAPPING  07/25/2012         LEFT HEART CATH BY TRANSEPTAL PUNCTURE  07/25/2012         PACEMAKER  01/04/2023    BAYLEE ECHO COLOR FLOW  07/25/2012         WISDOM TOOTH EXTRACTION      40 yrs ago     Social History     Tobacco Use    Smoking status: Never    Smokeless tobacco: Never   Vaping Use    Vaping Use: Never used   Substance Use Topics    Alcohol use: No    Drug use: No     Family History   Problem Relation Age of Onset    Hypertension Mother      Allergies   Allergen Reactions    Silver Sulfadiazine Swelling         /70   Pulse 65   Resp 13   Ht 6' 3\" (1.905 m)   Wt (!) 314 lb (142.4 kg)   SpO2 96%   BMI 39.25 kg/m²         Wt Readings from Last 3 Encounters:   05/26/23 (!) 314 lb (142.4 kg)   05/23/23 (!) 328 lb 6.4 oz (149 kg)   05/05/23 (!) 330 lb (149.7 kg)             Review of Systems:   Pertinent items are noted in the History of Present Illness. PHYSICAL EXAM    Physical Exam  Neck:      Vascular: No carotid bruit or JVD. Cardiovascular:      Rate and Rhythm: Rhythm regularly irregular. Heart sounds: Heart sounds are distant. Pulmonary:      Breath sounds: Normal breath sounds. Abdominal:      Palpations: Abdomen is soft. Musculoskeletal:      Right lower leg: No edema. Left lower leg: No edema.         Current Outpatient Medications

## 2023-07-17 DIAGNOSIS — G89.29 CHRONIC MIDLINE LOW BACK PAIN WITHOUT SCIATICA: ICD-10-CM

## 2023-07-17 DIAGNOSIS — M54.50 CHRONIC MIDLINE LOW BACK PAIN WITHOUT SCIATICA: ICD-10-CM

## 2023-07-17 RX ORDER — CYCLOBENZAPRINE HCL 10 MG
10 TABLET ORAL NIGHTLY PRN
Qty: 30 TABLET | Refills: 1 | Status: SHIPPED | OUTPATIENT
Start: 2023-07-17 | End: 2023-09-15

## 2023-07-17 NOTE — TELEPHONE ENCOUNTER
Requested Prescriptions     Pending Prescriptions Disp Refills    cyclobenzaprine (FLEXERIL) 10 MG tablet [Pharmacy Med Name: CYCLOBENZAPRINE 10 MG TABLET] 30 tablet 1     Sig: TAKE 1 TABLET BY MOUTH NIGHTLY AS NEEDED FOR MUSCLE SPASMS.         [facility]/pharmacy #[id number] [place], [place] - [address] [facility] [phone] [place] [facility] [address] [address]  Phone: [phone] Fax: [phone]       Last appt 5/23/2023      Future Appointments   Date Time Provider [address] [place] [address]   8/7/2023  1:30 PM Riya MATSON BS AMB   8/28/2023 10:00 AM MD DANO White BS AMB   5/28/2024  8:40 AM MD DANO Garcia BS AMB   5/28/2024  8:40 AM BEN LYONS BS AMB

## 2023-07-18 RX ORDER — LOSARTAN POTASSIUM 25 MG/1
TABLET ORAL
Qty: 90 TABLET | Refills: 1 | Status: SHIPPED | OUTPATIENT
Start: 2023-07-18

## 2023-07-18 NOTE — TELEPHONE ENCOUNTER
Cardiologist: Dr. Hakan Montana   Date Time Provider [address]   8/7/2023  1:30 PM Meghan MATSON BS AMB   8/28/2023 10:00 AM MD DANO Sage BS AMB   5/28/2024  8:40 AM MD DANO Avendano BS AMB   5/28/2024  8:40 AM PACEMAKER3, BEN MATSON BS AMB       Requested Prescriptions     Signed Prescriptions Disp Refills    losartan (COZAAR) 25 MG tablet 90 tablet 1     Sig: TAKE 1 TABLET BY MOUTH EVERY DAY     Authorizing Provider: Zack Dumont     Ordering User: SAMMY AVILA         Refills VO per Dr. Vaishali Suazo.

## 2023-08-28 ENCOUNTER — OFFICE VISIT (OUTPATIENT)
Age: 63
End: 2023-08-28
Payer: COMMERCIAL

## 2023-08-28 VITALS
HEART RATE: 61 BPM | RESPIRATION RATE: 16 BRPM | WEIGHT: 315 LBS | SYSTOLIC BLOOD PRESSURE: 106 MMHG | OXYGEN SATURATION: 94 % | BODY MASS INDEX: 39.17 KG/M2 | DIASTOLIC BLOOD PRESSURE: 60 MMHG | HEIGHT: 75 IN

## 2023-08-28 DIAGNOSIS — R06.02 SHORTNESS OF BREATH: ICD-10-CM

## 2023-08-28 DIAGNOSIS — I48.0 PAROXYSMAL ATRIAL FIBRILLATION (HCC): Primary | ICD-10-CM

## 2023-08-28 PROCEDURE — 93000 ELECTROCARDIOGRAM COMPLETE: CPT | Performed by: SPECIALIST

## 2023-08-28 PROCEDURE — 99214 OFFICE O/P EST MOD 30 MIN: CPT | Performed by: SPECIALIST

## 2023-08-28 RX ORDER — LOSARTAN POTASSIUM 25 MG/1
12.5 TABLET ORAL
Qty: 90 TABLET | Refills: 1 | Status: SHIPPED | OUTPATIENT
Start: 2023-08-28

## 2023-08-28 RX ORDER — FUROSEMIDE 40 MG/1
TABLET ORAL
COMMUNITY
Start: 2023-06-06 | End: 2023-09-01

## 2023-08-28 ASSESSMENT — PATIENT HEALTH QUESTIONNAIRE - PHQ9
SUM OF ALL RESPONSES TO PHQ QUESTIONS 1-9: 0
2. FEELING DOWN, DEPRESSED OR HOPELESS: 0
SUM OF ALL RESPONSES TO PHQ9 QUESTIONS 1 & 2: 0
SUM OF ALL RESPONSES TO PHQ QUESTIONS 1-9: 0
SUM OF ALL RESPONSES TO PHQ QUESTIONS 1-9: 0
1. LITTLE INTEREST OR PLEASURE IN DOING THINGS: 0
SUM OF ALL RESPONSES TO PHQ QUESTIONS 1-9: 0

## 2023-08-28 NOTE — PATIENT INSTRUCTIONS
Decrease losartan 12.5 mg every night. Schedule echocardiogram in 4 months.     Follow up with Dr. Sandra Tellez, 1 week after echocardiogram.

## 2023-08-28 NOTE — PROGRESS NOTES
glands after the uneventful administration of intravenous contrast.  Contiguous 5 mm axial images were reconstructed and lung and soft tissue windows  were generated. Coronal and sagittal reformations were generated. CT dose  reduction was achieved through use of a standardized protocol tailored for this  examination and automatic exposure control for dose modulation. FINDINGS:  Lower neck: No thyroid nodule. Soft tissue within normal limits. Lungs/Pleura: No evidence of consolidation, pleural effusion, or pneumothorax. No suspicious pulmonary nodules. Subsegmental scarring/atelectasis in the left  lower lobe with elevated left hemidiaphragm, similar to prior study. Mediastinum: Bilateral atrial enlargement. No pericardial effusion. Left chest  wall implantable cardiac pacemaker leads extending to right atrium and right  ventricle. The main pulmonary artery and thoracic aorta caliber as are within  normal limits. No central pulmonary embolism. Bovine arch aortic branching  pattern, developmental variant. The esophagus is grossly within normal limits. No pathologic mediastinal or hilar adenopathy. Chest wall/Soft Tissue: No mass lesion or pathologic axillary lymphadenopathy. Partially imaged left chest wall implantable cardiac pacemaker battery pack  associated metallic artifact preclude optimal evaluation of adjacent structures. Upper Abdomen: No suspicious mass lesion. Multiple left kidney cysts. Bones: No evidence of acute fracture, dislocation, or aggressive osseous  abnormality. There is mild levoscoliosis of the thoracic spine. Impression  No acute cardiopulmonary abnormality. Cardiomegaly with implantable cardiac pacemaker. Xray Result (most recent):  XR CHEST STANDARD TWO VW 04/28/2023    Narrative  Indication:  Shortness of breath    Exam: PA and lateral views of the chest.    Direct comparison is made to prior CXR dated 12/2022.     Findings: Cardiomediastinal silhouette is

## 2023-09-01 RX ORDER — METOPROLOL SUCCINATE 25 MG/1
TABLET, EXTENDED RELEASE ORAL NIGHTLY
Qty: 90 TABLET | Refills: 1 | Status: SHIPPED | OUTPATIENT
Start: 2023-09-01

## 2023-09-01 RX ORDER — POTASSIUM CHLORIDE 750 MG/1
TABLET, EXTENDED RELEASE ORAL
Qty: 90 TABLET | Refills: 1 | Status: SHIPPED | OUTPATIENT
Start: 2023-09-01

## 2023-09-01 RX ORDER — FUROSEMIDE 40 MG/1
TABLET ORAL
Qty: 90 TABLET | Refills: 1 | Status: SHIPPED | OUTPATIENT
Start: 2023-09-01

## 2023-09-01 NOTE — TELEPHONE ENCOUNTER
Requested Prescriptions     Signed Prescriptions Disp Refills    KLOR-CON M10 10 MEQ extended release tablet 90 tablet 1     Sig: TAKE 1 TABLET BY MOUTH EVERY DAY     Authorizing Provider: Russ Bland     Ordering User: THEODORE Quezada    metoprolol succinate (TOPROL XL) 25 MG extended release tablet 90 tablet 1     Sig: TAKE 1 TABLET BY MOUTH NIGHTLY     Authorizing Provider: Russ Bland     Ordering User: Marjorie THEODORE Wilkerson    furosemide (LASIX) 40 MG tablet 90 tablet 1     Sig: TAKE 1 TABLET BY MOUTH EVERY DAY *DOSE INCREASE*     Authorizing Provider: Russ Bland     Ordering User: Ethan Alcantar     Verbal order per Dr. Roscoe Mejía.     Future Appointments   Date Time Provider [address]   12/28/2023 11:00 AM BSC CONTRERAS ECHO 2 CAVREY BS AMB   1/5/2024 11:40 AM MD DANO Montalvo BS AMB   5/28/2024  8:40 AM MD DANO Kitchen BS AMB   5/28/2024  8:40 AM BEN LYONS BS AMB

## 2023-10-19 RX ORDER — APIXABAN 5 MG/1
5 TABLET, FILM COATED ORAL 2 TIMES DAILY
Qty: 180 TABLET | Refills: 1 | Status: SHIPPED | OUTPATIENT
Start: 2023-10-19

## 2023-10-19 NOTE — TELEPHONE ENCOUNTER
Requested Prescriptions     Signed Prescriptions Disp Refills    ELIQUIS 5 MG TABS tablet 180 tablet 1     Sig: TAKE 1 TABLET BY MOUTH TWO TIMES A DAY. Authorizing Provider: Zac Navarrete     Ordering User: Victoria Whatley     Verbal order per Dr. Florencio Low.     Future Appointments   Date Time Provider [address]   12/28/2023 11:00 AM [facility] ECHO 2 DANO CORRAL AMB   1/5/2024 11:40 AM MD DANO Sherwood AMB   5/28/2024  8:40 AM MD DANO Unger AMB   5/28/2024  8:40 AM BEN LYONS AMB

## 2023-10-20 RX ORDER — FUROSEMIDE 20 MG/1
20 TABLET ORAL DAILY
Qty: 90 TABLET | Refills: 1 | OUTPATIENT
Start: 2023-10-20

## 2023-10-20 NOTE — TELEPHONE ENCOUNTER
Cardiologist: Dr. Purnima Mendez   Date Time Provider [address]   12/28/2023 11:00 AM [facility] ECHO 2 DANO CORRAL AMB   1/5/2024 11:40 AM MD DANO [place] AMB   5/28/2024  8:40 AM MD DANO Marie AMB   5/28/2024  8:40 AM [email or web address] BS AMB       Requested Prescriptions     Refused Prescriptions Disp Refills    furosemide (LASIX) 20 MG tablet [Pharmacy Med Name: FUROSEMIDE 20 MG TABLET] 90 tablet 1     Sig: TAKE 1 TABLET BY MOUTH EVERY DAY     Refused By: Bhupinder Echevarria     Reason for Refusal: Medication dose changed         Refills VO per Dr. Kandis Barnes.

## 2023-10-25 ENCOUNTER — NURSE TRIAGE (OUTPATIENT)
Dept: OTHER | Facility: CLINIC | Age: 63
End: 2023-10-25

## 2023-10-25 NOTE — TELEPHONE ENCOUNTER
[place] [facility]    Received call from Damaris at [facility] with [employer]. Subjective: Caller states \" Left ankle pain, no injury, pain going up leg now. \"     Current Symptoms:   Some numbness in foot as well, this has been going on for about a year, went to his other PCP and he referred him to get a brace, but he needs a new referral so he can get another pair of shoes for a brace. 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 Sometimes my foot will get numb and here lately I have been having pain shooting up my leg on my shin, below my knee. \"    Ankle is swollen but not foot, denies any redness. Onset: 1 year ago; worsening    Associated Symptoms: NA    Pain Severity: 3/10; aching; waxing and waning    Temperature: Denies     What has been tried: Tylenol. Recommended disposition: See PCP within 24 Hours    Care advice provided, patient verbalizes understanding; denies any other questions or concerns; instructed to call back for any new or worsening symptoms. Patient/Caller agrees with recommended disposition; writer provided warm transfer to [facility] for appointment scheduling    Attention Provider: Thank you for allowing me to participate in the care of your patient. The patient was connected to triage in response to information provided to the ECC/PSC. Please do not respond through this encounter as the response is not directed to a shared pool. Reason for Disposition   Numbness (i.e., loss of sensation) in foot or toes  (Exception: Just tingling; numbness present > 2 weeks.)    Protocols used:  Foot Pain-ADULT-

## 2023-10-26 ENCOUNTER — OFFICE VISIT (OUTPATIENT)
Age: 63
End: 2023-10-26

## 2023-10-26 VITALS
HEART RATE: 68 BPM | TEMPERATURE: 98.4 F | SYSTOLIC BLOOD PRESSURE: 118 MMHG | BODY MASS INDEX: 40.72 KG/M2 | OXYGEN SATURATION: 95 % | WEIGHT: 315 LBS | DIASTOLIC BLOOD PRESSURE: 80 MMHG

## 2023-10-26 DIAGNOSIS — I87.2 VENOUS STASIS DERMATITIS OF LEFT LOWER EXTREMITY: ICD-10-CM

## 2023-10-26 DIAGNOSIS — L03.116 CELLULITIS OF LEFT LOWER EXTREMITY: Primary | ICD-10-CM

## 2023-10-26 RX ORDER — TRIAMCINOLONE ACETONIDE 1 MG/G
CREAM TOPICAL
Qty: 80 G | Refills: 0 | Status: SHIPPED | OUTPATIENT
Start: 2023-10-26

## 2023-10-26 RX ORDER — CEPHALEXIN 500 MG/1
500 CAPSULE ORAL 4 TIMES DAILY
Qty: 28 CAPSULE | Refills: 0 | Status: SHIPPED | OUTPATIENT
Start: 2023-10-26

## 2023-10-26 NOTE — PROGRESS NOTES
Dami Vasquez (:  1960) is a 66 y.o. male,New patient, here for evaluation of the following chief complaint(s):  Leg Swelling (Patient has been experiencing leg swelling and pain since last Thurs)      ASSESSMENT/PLAN:      Suspect stasis dermatitis and possible early cellulitis, do not suspect DVT due to lack of risk factors, + eliquis use  Take antibiotics as ordered for cellulitis  Triamcinolone cream for stasis dermatitis to lower leg  Use compression stockings for swelling  Elevate legs frequently and allow swelling to come down  Follow up with podiatry for large callus on bottom of foot and for chronic foot and ankle pain  See PCP for further management of edema  ED for any shortness of breath, chest pain, syncope, or coolness/numbness to feet    SUBJECTIVE/OBJECTIVE:  HPI     66 y.o. male presents with symptoms of pain and swelling to left lower extremity. Reports pain and swelling for past 1-2 years, worse for about 1 week. Notes chronic pain to lateral ankle and dorsal aspect of foot, now with some discoloration, red/purple, pain to medial calf. Noted pain, described as dull and achy, intermittent in nature, 7 days ago when out walking though he denies any acute injury or trauma to the area. Denies any fevers or chills, denies chest pain. Reports some mild shortness of breath for the past 1 year, is having this worked up by PCP. Notes no worsening in SOB with current episode. Has been taking lasix for water retention, prescribed by cardiologist. Notes that his legs are quite swollen by the end of each day, does wear compression stockings. Takes Eliquis for atrial fibrillation. Denies smoking. No recent immobilization or prolonged sitting. No recent surgery. No pain, swelling or redness to back of knee or upper leg.          Vitals:    10/26/23 1242 10/26/23 1244   BP: (!) 140/81 118/80   Site: Left Upper Arm Right Upper Arm   Position: Sitting Sitting   Cuff Size: Large Adult    Pulse: 68

## 2023-10-26 NOTE — PATIENT INSTRUCTIONS
Take antibiotics as ordered  Triamcinolone cream for irritation to lower leg  Use compression stockings  Elevate legs and allow swelling to come down  Follow up with podiatry for callus on bottom of foot  See PCP for further management

## 2023-11-07 ENCOUNTER — OFFICE VISIT (OUTPATIENT)
Age: 63
End: 2023-11-07
Payer: COMMERCIAL

## 2023-11-07 VITALS
OXYGEN SATURATION: 95 % | HEIGHT: 75 IN | RESPIRATION RATE: 17 BRPM | SYSTOLIC BLOOD PRESSURE: 119 MMHG | HEART RATE: 86 BPM | WEIGHT: 315 LBS | TEMPERATURE: 98 F | BODY MASS INDEX: 39.17 KG/M2 | DIASTOLIC BLOOD PRESSURE: 75 MMHG

## 2023-11-07 DIAGNOSIS — E66.01 MORBID (SEVERE) OBESITY DUE TO EXCESS CALORIES (HCC): ICD-10-CM

## 2023-11-07 DIAGNOSIS — G47.33 OBSTRUCTIVE SLEEP APNEA (ADULT) (PEDIATRIC): ICD-10-CM

## 2023-11-07 DIAGNOSIS — Z95.0 PACEMAKER: ICD-10-CM

## 2023-11-07 DIAGNOSIS — Z13.220 LIPID SCREENING: ICD-10-CM

## 2023-11-07 DIAGNOSIS — I50.22 CHRONIC SYSTOLIC (CONGESTIVE) HEART FAILURE (HCC): ICD-10-CM

## 2023-11-07 DIAGNOSIS — R05.1 ACUTE COUGH: Primary | ICD-10-CM

## 2023-11-07 PROCEDURE — 99214 OFFICE O/P EST MOD 30 MIN: CPT | Performed by: INTERNAL MEDICINE

## 2023-11-07 RX ORDER — AZITHROMYCIN 250 MG/1
250 TABLET, FILM COATED ORAL SEE ADMIN INSTRUCTIONS
Qty: 6 TABLET | Refills: 0 | Status: SHIPPED | OUTPATIENT
Start: 2023-11-07 | End: 2023-11-12

## 2023-11-07 ASSESSMENT — ENCOUNTER SYMPTOMS
SORE THROAT: 1
COUGH: 1
BACK PAIN: 1
GASTROINTESTINAL NEGATIVE: 1
SHORTNESS OF BREATH: 1

## 2023-11-07 NOTE — PROGRESS NOTES
1. \"Have you been to the ER, urgent care clinic since your last visit? Hospitalized since your last visit? \"   URGENT CARE  LAST THURSDAY  LEG PAIN    2. \"Have you seen or consulted any other health care providers outside of the [address] [place] [address] since your last visit? \" No    3. For patients aged 43-73: Has the patient had a colonoscopy / FIT/ Cologuard?    NO
8-28-23. To start taking at night) 90 tablet 1    acetaminophen (TYLENOL) 500 MG tablet Take by mouth every 6 hours as needed       No current facility-administered medications on file prior to visit. Review of Systems   Constitutional: Negative. HENT:  Positive for congestion and sore throat. Respiratory:  Positive for cough and shortness of breath. Cardiovascular: Negative. Gastrointestinal: Negative. Musculoskeletal:  Positive for back pain. Neurological: Negative. Psychiatric/Behavioral: Negative. Objective    Physical Exam  Vitals and nursing note reviewed. Constitutional:       General: He is not in acute distress. Appearance: He is obese. He is not toxic-appearing. HENT:      Right Ear: Tympanic membrane normal.      Left Ear: Tympanic membrane normal.      Nose: Congestion present. Mouth/Throat:      Pharynx: Oropharynx is clear. Cardiovascular:      Rate and Rhythm: Normal rate. Rhythm irregular. Heart sounds: No murmur heard. Pulmonary:      Effort: Pulmonary effort is normal. No respiratory distress. Breath sounds: Normal breath sounds. No wheezing. Abdominal:      General: Bowel sounds are normal.      Palpations: Abdomen is soft. Musculoskeletal:      Comments: Trace edema to lower legs    Skin:     General: Skin is warm. Neurological:      Mental Status: He is alert and oriented to person, place, and time. Psychiatric:         Behavior: Behavior normal.            Assessment & Plan      Tiffany Hernández was seen today for foot swelling, numbness and follow-up chronic condition. Diagnoses and all orders for this visit:    Acute cough  -     azithromycin (ZITHROMAX) 250 MG tablet; Take 1 tablet by mouth See Admin Instructions for 5 days 500mg on day 1 followed by 250mg on days 2 - 5    Chronic systolic (congestive) heart failure  -     Brain Natriuretic Peptide; Future  -     Comprehensive Metabolic Panel;  Future  -     TSH;

## 2023-11-08 LAB
ALBUMIN SERPL-MCNC: 4.1 G/DL (ref 3.9–4.9)
ALBUMIN/GLOB SERPL: 1.8 {RATIO} (ref 1.2–2.2)
ALP SERPL-CCNC: 89 IU/L (ref 44–121)
ALT SERPL-CCNC: 31 IU/L (ref 0–44)
AST SERPL-CCNC: 31 IU/L (ref 0–40)
BILIRUB SERPL-MCNC: 0.3 MG/DL (ref 0–1.2)
BUN SERPL-MCNC: 10 MG/DL (ref 8–27)
BUN/CREAT SERPL: 12 (ref 10–24)
CALCIUM SERPL-MCNC: 9.1 MG/DL (ref 8.6–10.2)
CHLORIDE SERPL-SCNC: 99 MMOL/L (ref 96–106)
CHOLEST SERPL-MCNC: 114 MG/DL (ref 100–199)
CO2 SERPL-SCNC: 25 MMOL/L (ref 20–29)
CREAT SERPL-MCNC: 0.86 MG/DL (ref 0.76–1.27)
EGFRCR SERPLBLD CKD-EPI 2021: 99 ML/MIN/1.73
GLOBULIN SER CALC-MCNC: 2.3 G/DL (ref 1.5–4.5)
GLUCOSE SERPL-MCNC: 120 MG/DL (ref 70–99)
HDLC SERPL-MCNC: 38 MG/DL
LDLC SERPL CALC-MCNC: 55 MG/DL (ref 0–99)
POTASSIUM SERPL-SCNC: 4.9 MMOL/L (ref 3.5–5.2)
PROT SERPL-MCNC: 6.4 G/DL (ref 6–8.5)
SODIUM SERPL-SCNC: 138 MMOL/L (ref 134–144)
TRIGL SERPL-MCNC: 115 MG/DL (ref 0–149)
TSH SERPL DL<=0.005 MIU/L-ACNC: 1.11 UIU/ML (ref 0.45–4.5)
VLDLC SERPL CALC-MCNC: 21 MG/DL (ref 5–40)

## 2023-11-09 LAB
BNP SERPL-MCNC: 294.9 PG/ML (ref 0–100)
IMP & REVIEW OF LAB RESULTS: NORMAL

## 2023-11-30 RX ORDER — FUROSEMIDE 40 MG/1
TABLET ORAL
Qty: 90 TABLET | Refills: 0 | Status: SHIPPED | OUTPATIENT
Start: 2023-11-30

## 2023-11-30 NOTE — TELEPHONE ENCOUNTER
Cardiologist: Dr. Prince Mondragon   Date Time Provider [address]   12/28/2023 11:00 AM [facility] 2 DANO CORRAL AMB   1/5/2024 11:40 AM MD DANO Rain AMB   5/28/2024  8:40 AM MD DANO Soto AMB   5/28/2024  8:40 AM PACEMAKER3, BEN MATSON BS AMB       Requested Prescriptions     Signed Prescriptions Disp Refills    furosemide (LASIX) 40 MG tablet 90 tablet 0     Sig: TAKE 1 TABLET BY MOUTH EVERY DAY *DOSE INCREASE*     Authorizing Provider: Barrington Weems     Ordering User: SAMMY AVILA         Refjosé VO per Dr. Vinicio Silva.

## 2023-12-01 ENCOUNTER — TELEPHONE (OUTPATIENT)
Age: 63
End: 2023-12-01

## 2023-12-01 RX ORDER — METOPROLOL SUCCINATE 25 MG/1
25 TABLET, EXTENDED RELEASE ORAL NIGHTLY
Qty: 90 TABLET | Refills: 1 | Status: SHIPPED | OUTPATIENT
Start: 2023-12-01

## 2023-12-01 NOTE — TELEPHONE ENCOUNTER
Patient called requesting refill for medication Metoprolol 25mg send to [facility]# [phone]    Patient# [phone]

## 2023-12-01 NOTE — TELEPHONE ENCOUNTER
Requested Prescriptions     Signed Prescriptions Disp Refills    metoprolol succinate (TOPROL XL) 25 MG extended release tablet 90 tablet 1     Sig: Take 1 tablet by mouth nightly     Authorizing Provider: Elisabeth Forbes     Ordering User: Libby Bailey     Verbal order per Dr. Christy Weems.     Future Appointments   Date Time Provider [address]   12/28/2023 11:00 AM [facility] ECHO 2 DANO CORRAL AMB   1/5/2024 11:40 AM MD DANO Chacon AMB   5/28/2024  8:40 AM MD DANO Boogie AMB   5/28/2024  8:40 AM BEN LYONS AMB

## 2024-01-05 ENCOUNTER — OFFICE VISIT (OUTPATIENT)
Age: 64
End: 2024-01-05
Payer: COMMERCIAL

## 2024-01-05 VITALS
BODY MASS INDEX: 39.17 KG/M2 | DIASTOLIC BLOOD PRESSURE: 84 MMHG | SYSTOLIC BLOOD PRESSURE: 118 MMHG | HEART RATE: 79 BPM | HEIGHT: 75 IN | WEIGHT: 315 LBS | OXYGEN SATURATION: 96 %

## 2024-01-05 DIAGNOSIS — Z95.0 PACEMAKER: ICD-10-CM

## 2024-01-05 DIAGNOSIS — I48.19 PERSISTENT ATRIAL FIBRILLATION WITH RAPID VENTRICULAR RESPONSE (HCC): Primary | ICD-10-CM

## 2024-01-05 DIAGNOSIS — R06.02 SHORTNESS OF BREATH: ICD-10-CM

## 2024-01-05 DIAGNOSIS — I47.10 SVT (SUPRAVENTRICULAR TACHYCARDIA): ICD-10-CM

## 2024-01-05 PROCEDURE — 93000 ELECTROCARDIOGRAM COMPLETE: CPT | Performed by: SPECIALIST

## 2024-01-05 PROCEDURE — 99214 OFFICE O/P EST MOD 30 MIN: CPT | Performed by: SPECIALIST

## 2024-01-05 RX ORDER — CYCLOBENZAPRINE HCL 10 MG
10 TABLET ORAL 3 TIMES DAILY PRN
COMMUNITY

## 2024-01-05 RX ORDER — ATENOLOL 25 MG/1
TABLET ORAL
Qty: 2 TABLET | Refills: 0 | Status: SHIPPED | OUTPATIENT
Start: 2024-01-05

## 2024-01-05 ASSESSMENT — PATIENT HEALTH QUESTIONNAIRE - PHQ9
SUM OF ALL RESPONSES TO PHQ QUESTIONS 1-9: 0
SUM OF ALL RESPONSES TO PHQ QUESTIONS 1-9: 0
1. LITTLE INTEREST OR PLEASURE IN DOING THINGS: 0
SUM OF ALL RESPONSES TO PHQ9 QUESTIONS 1 & 2: 0
2. FEELING DOWN, DEPRESSED OR HOPELESS: 0
SUM OF ALL RESPONSES TO PHQ QUESTIONS 1-9: 0
SUM OF ALL RESPONSES TO PHQ QUESTIONS 1-9: 0

## 2024-01-05 NOTE — PROGRESS NOTES
Scheduled appt with Dr. Herrera.  Referral, office visit, insurance information and demographics faxed to [facility] @ [phone].  Confirmation received.   
  Diagnosis Date    Atrial fibrillation (HCC)     Pacemaker      Past Surgical History:   Procedure Laterality Date    ABLATION, ATRIAL FIBRILLATION  07/25/2012         CARDIAC SURGERY  08/17/2012         ECHO INTRACARDIAC  07/25/2012         EP STUDY W/O INDUCTION  07/25/2012         ICAR CATHETER ABLATION ATRIOVENTR NODE FUNCTION  07/16/2018         INTRACARDIAC ELECTROPHYSIOLOGIC 3D MAPPING  07/25/2012         LEFT HEART CATH BY TRANSEPTAL PUNCTURE  07/25/2012         PACEMAKER  01/04/2023    BAYLEE ECHO COLOR FLOW  07/25/2012         WISDOM TOOTH EXTRACTION      40 yrs ago     Social History     Tobacco Use    Smoking status: Never    Smokeless tobacco: Never   Vaping Use    Vaping Use: Never used   Substance Use Topics    Alcohol use: No    Drug use: No     Family History   Problem Relation Age of Onset    Hypertension Mother      Allergies   Allergen Reactions    Silver Sulfadiazine Rash, Swelling and Other (See Comments)         /84 (Site: Left Upper Arm, Position: Sitting, Cuff Size: Large Adult)   Pulse 79   Ht 1.905 m (6' 3\")   Wt (!) 144.9 kg (319 lb 6.4 oz)   SpO2 96%   BMI 39.92 kg/m²         Wt Readings from Last 3 Encounters:   01/05/24 (!) 144.9 kg (319 lb 6.4 oz)   12/28/23 (!) 149.2 kg (329 lb)   11/07/23 (!) 149.2 kg (329 lb)             Review of Systems:   Pertinent items are noted in the History of Present Illness.       PHYSICAL EXAM    Physical Exam  Neck:      Vascular: No carotid bruit.   Cardiovascular:      Rate and Rhythm: Normal rate. Rhythm regularly irregular.      Heart sounds: Heart sounds are distant.   Pulmonary:      Effort: Pulmonary effort is normal.      Breath sounds: Examination of the right-lower field reveals decreased breath sounds. Examination of the left-lower field reveals decreased breath sounds. Decreased breath sounds present.   Abdominal:      Palpations: Abdomen is soft.   Musculoskeletal:      Right lower leg: No edema.      Left lower leg: No edema.

## 2024-01-05 NOTE — PATIENT INSTRUCTIONS
A cardiac CTA has been ordered. Expect a call from [facility]. If you would like, you can contact them at [phone].    You will take atenolol 25 mg the night prior to CTA and 25 mg, 1 hour prior to CTA.      Do not take metoprolol on these days.    An appointment has been scheduled with Dr. Robert Herrera @ [facility] on 1-29-24 @ 12:30 at [address] [facility] [address].   If you need to cancel or reschedule, please call their office @ [phone].    Follow up with Dr. Abdul after the above.

## 2024-01-23 ENCOUNTER — HOSPITAL ENCOUNTER (OUTPATIENT)
Facility: HOSPITAL | Age: 64
Discharge: HOME | End: 2024-01-26
Attending: SPECIALIST
Payer: COMMERCIAL

## 2024-01-23 DIAGNOSIS — R06.02 SHORTNESS OF BREATH: ICD-10-CM

## 2024-01-23 PROCEDURE — 75574 CT ANGIO HRT W/3D IMAGE: CPT

## 2024-01-23 PROCEDURE — 6360000004 HC RX CONTRAST MEDICATION: Performed by: SPECIALIST

## 2024-01-23 RX ADMIN — IOPAMIDOL 100 ML: 755 INJECTION, SOLUTION INTRAVENOUS at 12:20

## 2024-01-25 NOTE — TELEPHONE ENCOUNTER
Requested Prescriptions     Signed Prescriptions Disp Refills    apixaban (ELIQUIS) 5 MG TABS tablet 180 tablet 1     Sig: Take 1 tablet by mouth 2 times daily     Authorizing Provider: ED ABDUL     Ordering User: THEODORE BLAKE     Verbal order per Dr. Abdul.    Future Appointments   Date Time Provider Department Center   3/11/2024  1:40 PM Ed Abdul MD [facility] AM[facility]   5/28/2024  8:40 AM Arben Olmos MD [facility]   5/28/2024  8:40 AM BEN LYONS BS AMB

## 2024-02-22 RX ORDER — FUROSEMIDE 40 MG/1
40 TABLET ORAL DAILY
Qty: 90 TABLET | Refills: 1 | Status: SHIPPED | OUTPATIENT
Start: 2024-02-22

## 2024-02-22 NOTE — TELEPHONE ENCOUNTER
VO per MD    Future Appointments   Date Time Provider Department Center   3/11/2024  1:40 PM Ed Abdul MD [facility]   5/28/2024  8:40 AM Arben Olmos MD [facility] AM[facility]   5/28/2024  8:40 AM BEN LYONS BS AMB

## 2024-02-26 RX ORDER — POTASSIUM CHLORIDE 750 MG/1
TABLET, EXTENDED RELEASE ORAL
Qty: 90 TABLET | Refills: 1 | Status: SHIPPED | OUTPATIENT
Start: 2024-02-26

## 2024-02-26 NOTE — TELEPHONE ENCOUNTER
Cardiologist: Dr. Abdul    Future Appointments   Date Time Provider Department Center   3/11/2024  1:40 PM Ed Abdul MD [facility]   5/28/2024  8:40 AM Arben Olmos MD [facility] AM[facility]   5/28/2024  8:40 AM BEN LYONS BS AMB       Requested Prescriptions     Signed Prescriptions Disp Refills    KLOR-CON M10 10 MEQ extended release tablet 90 tablet 1     Sig: TAKE 1 TABLET BY MOUTH EVERY DAY     Authorizing Provider: ED ABDUL     Ordering User: SAMYM AVILA         Refills VO per Dr. Abdul.

## 2024-04-01 ENCOUNTER — HOSPITAL ENCOUNTER (OUTPATIENT)
Facility: HOSPITAL | Age: 64
Discharge: HOME | End: 2024-04-04
Payer: COMMERCIAL

## 2024-04-01 ENCOUNTER — OFFICE VISIT (OUTPATIENT)
Age: 64
End: 2024-04-01
Payer: COMMERCIAL

## 2024-04-01 VITALS
OXYGEN SATURATION: 95 % | DIASTOLIC BLOOD PRESSURE: 82 MMHG | RESPIRATION RATE: 18 BRPM | HEART RATE: 68 BPM | BODY MASS INDEX: 39.04 KG/M2 | SYSTOLIC BLOOD PRESSURE: 130 MMHG | HEIGHT: 75 IN | WEIGHT: 314 LBS

## 2024-04-01 DIAGNOSIS — E66.01 SEVERE OBESITY (BMI 35.0-39.9) WITH COMORBIDITY (HCC): ICD-10-CM

## 2024-04-01 DIAGNOSIS — I48.19 PERSISTENT ATRIAL FIBRILLATION (HCC): ICD-10-CM

## 2024-04-01 DIAGNOSIS — I50.22 CHRONIC SYSTOLIC (CONGESTIVE) HEART FAILURE (HCC): ICD-10-CM

## 2024-04-01 DIAGNOSIS — M25.552 LEFT HIP PAIN: ICD-10-CM

## 2024-04-01 DIAGNOSIS — Z13.220 LIPID SCREENING: ICD-10-CM

## 2024-04-01 DIAGNOSIS — I50.22 CHRONIC SYSTOLIC (CONGESTIVE) HEART FAILURE (HCC): Primary | ICD-10-CM

## 2024-04-01 DIAGNOSIS — R06.09 DOE (DYSPNEA ON EXERTION): ICD-10-CM

## 2024-04-01 DIAGNOSIS — G47.33 OBSTRUCTIVE SLEEP APNEA (ADULT) (PEDIATRIC): ICD-10-CM

## 2024-04-01 DIAGNOSIS — I27.20 PULMONARY HTN (HCC): ICD-10-CM

## 2024-04-01 DIAGNOSIS — Z95.0 PACEMAKER: ICD-10-CM

## 2024-04-01 LAB
BASOPHILS # BLD AUTO: 0 X10E3/UL (ref 0–0.2)
BASOPHILS NFR BLD AUTO: 0 %
EOSINOPHIL # BLD AUTO: 0.1 X10E3/UL (ref 0–0.4)
EOSINOPHIL NFR BLD AUTO: 1 %
ERYTHROCYTE [DISTWIDTH] IN BLOOD BY AUTOMATED COUNT: 12.1 % (ref 11.6–15.4)
HCT VFR BLD AUTO: 46.6 % (ref 37.5–51)
HGB BLD-MCNC: 15.9 G/DL (ref 13–17.7)
IMM GRANULOCYTES # BLD AUTO: 0.1 X10E3/UL (ref 0–0.1)
IMM GRANULOCYTES NFR BLD AUTO: 1 %
LYMPHOCYTES # BLD AUTO: 2 X10E3/UL (ref 0.7–3.1)
LYMPHOCYTES NFR BLD AUTO: 24 %
MCH RBC QN AUTO: 33.4 PG (ref 26.6–33)
MCHC RBC AUTO-ENTMCNC: 34.1 G/DL (ref 31.5–35.7)
MCV RBC AUTO: 98 FL (ref 79–97)
MONOCYTES # BLD AUTO: 0.7 X10E3/UL (ref 0.1–0.9)
MONOCYTES NFR BLD AUTO: 9 %
NEUTROPHILS # BLD AUTO: 5.7 X10E3/UL (ref 1.4–7)
NEUTROPHILS NFR BLD AUTO: 65 %
PLATELET # BLD AUTO: 189 X10E3/UL (ref 150–450)
RBC # BLD AUTO: 4.76 X10E6/UL (ref 4.14–5.8)
WBC # BLD AUTO: 8.6 X10E3/UL (ref 3.4–10.8)

## 2024-04-01 PROCEDURE — 99214 OFFICE O/P EST MOD 30 MIN: CPT | Performed by: INTERNAL MEDICINE

## 2024-04-01 PROCEDURE — 71046 X-RAY EXAM CHEST 2 VIEWS: CPT

## 2024-04-01 ASSESSMENT — ANXIETY QUESTIONNAIRES
1. FEELING NERVOUS, ANXIOUS, OR ON EDGE: NOT AT ALL
IF YOU CHECKED OFF ANY PROBLEMS ON THIS QUESTIONNAIRE, HOW DIFFICULT HAVE THESE PROBLEMS MADE IT FOR YOU TO DO YOUR WORK, TAKE CARE OF THINGS AT HOME, OR GET ALONG WITH OTHER PEOPLE: NOT DIFFICULT AT ALL
GAD7 TOTAL SCORE: 0
3. WORRYING TOO MUCH ABOUT DIFFERENT THINGS: NOT AT ALL
6. BECOMING EASILY ANNOYED OR IRRITABLE: NOT AT ALL
2. NOT BEING ABLE TO STOP OR CONTROL WORRYING: NOT AT ALL
5. BEING SO RESTLESS THAT IT IS HARD TO SIT STILL: NOT AT ALL
4. TROUBLE RELAXING: NOT AT ALL
7. FEELING AFRAID AS IF SOMETHING AWFUL MIGHT HAPPEN: NOT AT ALL

## 2024-04-01 ASSESSMENT — PATIENT HEALTH QUESTIONNAIRE - PHQ9
SUM OF ALL RESPONSES TO PHQ QUESTIONS 1-9: 0
SUM OF ALL RESPONSES TO PHQ9 QUESTIONS 1 & 2: 0
2. FEELING DOWN, DEPRESSED OR HOPELESS: NOT AT ALL
SUM OF ALL RESPONSES TO PHQ QUESTIONS 1-9: 0
1. LITTLE INTEREST OR PLEASURE IN DOING THINGS: NOT AT ALL

## 2024-04-01 ASSESSMENT — ENCOUNTER SYMPTOMS
WHEEZING: 0
GASTROINTESTINAL NEGATIVE: 1
SHORTNESS OF BREATH: 1

## 2024-04-01 NOTE — PROGRESS NOTES
Chief Complaint   Patient presents with    Hip Pain    Bursitis       \"Have you been to the ER, urgent care clinic since your last visit?  Hospitalized since your last visit?\"    NO    “Have you seen or consulted any other health care providers outside of [facility] since your last visit?”    NO      “Have you had a colorectal cancer screening such as a colonoscopy/FIT/Cologuard?    NO - Would like to get done    No colonoscopy on file  No cologuard on file  No FIT/FOBT on file   No flexible sigmoidoscopy on file         Click Here for Release of Records Request

## 2024-04-01 NOTE — PROGRESS NOTES
Subjective    Osito Ruff is a 63 y.o. male who presents today for the following: patient was seen for a follow up with his brother.     Reports that he is being followed by [facility] for bursitis. Is in PT right now for this. No falls but has not been able to stand for long periods of time. Has not been able to work.    Also continues to be followed by cardiology. Patient has Afib, HF, pacemaker and pulmonary HTN. Is taking lasix 40 mg daily. Reports ongoing SOB at times. No CP.     Pulmonary has ordered a chest xray to follow up on his SOB, and pulmonary HTN    Patient and brother are asking for information for a weight loss program. Wears a CPAP at night as well.     Chief Complaint   Patient presents with    Hip Pain    Bursitis           PMH/PSH/Allergies/Social History/medication list and most recent studies reviewed with patient.     reports that he has never smoked. He has never used smokeless tobacco.    reports no history of alcohol use.     Vitals:    04/01/24 1045   BP: 130/82   Pulse: 68   Resp: 18   SpO2: 95%     Body mass index is 39.25 kg/m².      4/1/2024    10:45 AM 1/5/2024    11:49 AM 12/28/2023    10:57 AM 11/7/2023    10:50 AM 10/26/2023    12:42 PM 8/28/2023    10:07 AM 5/26/2023    11:30 AM   Weight Metrics   Weight 314 lb 319 lb 6.4 oz 329 lb 329 lb 325 lb 12.8 oz 326 lb 314 lb   BMI (Calculated) 39.3 kg/m2 40 kg/m2 41.2 kg/m2 41.2 kg/m2 0 kg/m2 40.8 kg/m2 39.3 kg/m2       Past Medical History:   Diagnosis Date    Atrial fibrillation (HCC)     Pacemaker        Allergies   Allergen Reactions    Silver Sulfadiazine Rash, Swelling and Other (See Comments)        Current Outpatient Medications on File Prior to Visit   Medication Sig Dispense Refill    KLOR-CON M10 10 MEQ extended release tablet TAKE 1 TABLET BY MOUTH EVERY DAY 90 tablet 1    furosemide (LASIX) 40 MG tablet Take 1 tablet by mouth daily 90 tablet 1    apixaban (ELIQUIS) 5 MG TABS tablet Take 1 tablet by mouth 2 times daily 180

## 2024-04-02 ENCOUNTER — TELEPHONE (OUTPATIENT)
Age: 64
End: 2024-04-02

## 2024-04-02 LAB
ALBUMIN SERPL-MCNC: 4 G/DL (ref 3.9–4.9)
ALBUMIN/GLOB SERPL: 1.5 {RATIO} (ref 1.2–2.2)
ALP SERPL-CCNC: 100 IU/L (ref 44–121)
ALT SERPL-CCNC: 38 IU/L (ref 0–44)
AST SERPL-CCNC: 21 IU/L (ref 0–40)
BILIRUB SERPL-MCNC: 0.4 MG/DL (ref 0–1.2)
BNP SERPL-MCNC: 43.8 PG/ML (ref 0–100)
BUN SERPL-MCNC: 13 MG/DL (ref 8–27)
BUN/CREAT SERPL: 17 (ref 10–24)
CALCIUM SERPL-MCNC: 9.1 MG/DL (ref 8.6–10.2)
CHLORIDE SERPL-SCNC: 102 MMOL/L (ref 96–106)
CHOLEST SERPL-MCNC: 137 MG/DL (ref 100–199)
CO2 SERPL-SCNC: 23 MMOL/L (ref 20–29)
CREAT SERPL-MCNC: 0.77 MG/DL (ref 0.76–1.27)
EGFRCR SERPLBLD CKD-EPI 2021: 102 ML/MIN/1.73
GLOBULIN SER CALC-MCNC: 2.6 G/DL (ref 1.5–4.5)
GLUCOSE SERPL-MCNC: 96 MG/DL (ref 70–99)
HBA1C MFR BLD: 7.1 % (ref 4.8–5.6)
HDLC SERPL-MCNC: 50 MG/DL
IMP & REVIEW OF LAB RESULTS: NORMAL
LDLC SERPL CALC-MCNC: 64 MG/DL (ref 0–99)
Lab: NORMAL
POTASSIUM SERPL-SCNC: 4.4 MMOL/L (ref 3.5–5.2)
PROT SERPL-MCNC: 6.6 G/DL (ref 6–8.5)
SODIUM SERPL-SCNC: 137 MMOL/L (ref 134–144)
TRIGL SERPL-MCNC: 128 MG/DL (ref 0–149)
TSH SERPL DL<=0.005 MIU/L-ACNC: 1.09 UIU/ML (ref 0.45–4.5)
VLDLC SERPL CALC-MCNC: 23 MG/DL (ref 5–40)

## 2024-04-02 NOTE — TELEPHONE ENCOUNTER
Patient called in regarding his referral for [facility]. I sent the patient the [facility] seminar and advised to watch in its entirety.

## 2024-04-03 ENCOUNTER — PATIENT MESSAGE (OUTPATIENT)
Age: 64
End: 2024-04-03

## 2024-04-03 NOTE — TELEPHONE ENCOUNTER
Requested Prescriptions     Signed Prescriptions Disp Refills    apixaban (ELIQUIS) 5 MG TABS tablet 180 tablet 1     Sig: Take 1 tablet by mouth 2 times daily     Authorizing Provider: ED ABDUL     Ordering User: NOAM LIM per MD    Future Appointments   Date Time Provider Department Center   5/28/2024  8:40 AM Arben Olmos MD [facility]   5/28/2024  8:40 AM BEN LYONS [facility] AMB   5/31/2024 10:20 AM Ed Abdul MD [facility] AM[facility]

## 2024-04-03 NOTE — TELEPHONE ENCOUNTER
From: Osito Ruff  To: Dr. Ed Abdul  Sent: 4/3/2024 10:14 AM EDT  Subject: refill    a refill on eliquis

## 2024-04-29 ENCOUNTER — HOSPITAL ENCOUNTER (OUTPATIENT)
Facility: HOSPITAL | Age: 64
Discharge: HOME | End: 2024-05-02
Attending: INTERNAL MEDICINE
Payer: COMMERCIAL

## 2024-04-29 DIAGNOSIS — J98.4 RESTRICTIVE LUNG DISEASE: ICD-10-CM

## 2024-04-29 PROCEDURE — 71250 CT THORAX DX C-: CPT

## 2024-05-21 ENCOUNTER — PATIENT MESSAGE (OUTPATIENT)
Age: 64
End: 2024-05-21

## 2024-05-23 ENCOUNTER — OFFICE VISIT (OUTPATIENT)
Age: 64
End: 2024-05-23
Payer: COMMERCIAL

## 2024-05-23 VITALS
OXYGEN SATURATION: 95 % | SYSTOLIC BLOOD PRESSURE: 105 MMHG | WEIGHT: 315 LBS | BODY MASS INDEX: 39.17 KG/M2 | HEART RATE: 64 BPM | RESPIRATION RATE: 22 BRPM | DIASTOLIC BLOOD PRESSURE: 70 MMHG | HEIGHT: 75 IN | TEMPERATURE: 97.9 F

## 2024-05-23 DIAGNOSIS — Z95.0 PACEMAKER: ICD-10-CM

## 2024-05-23 DIAGNOSIS — I10 PRIMARY HYPERTENSION: ICD-10-CM

## 2024-05-23 DIAGNOSIS — I44.2 COMPLETE AV BLOCK DUE TO AV NODAL ABLATION (HCC): ICD-10-CM

## 2024-05-23 DIAGNOSIS — E66.01 CLASS 2 SEVERE OBESITY DUE TO EXCESS CALORIES WITH SERIOUS COMORBIDITY AND BODY MASS INDEX (BMI) OF 39.0 TO 39.9 IN ADULT (HCC): Primary | ICD-10-CM

## 2024-05-23 DIAGNOSIS — I48.19 PERSISTENT ATRIAL FIBRILLATION WITH RAPID VENTRICULAR RESPONSE (HCC): ICD-10-CM

## 2024-05-23 DIAGNOSIS — I97.190 COMPLETE AV BLOCK DUE TO AV NODAL ABLATION (HCC): ICD-10-CM

## 2024-05-23 DIAGNOSIS — D68.69 SECONDARY HYPERCOAGULABLE STATE (HCC): ICD-10-CM

## 2024-05-23 PROCEDURE — 3078F DIAST BP <80 MM HG: CPT | Performed by: FAMILY MEDICINE

## 2024-05-23 PROCEDURE — 99204 OFFICE O/P NEW MOD 45 MIN: CPT | Performed by: FAMILY MEDICINE

## 2024-05-23 PROCEDURE — 3074F SYST BP LT 130 MM HG: CPT | Performed by: FAMILY MEDICINE

## 2024-05-23 ASSESSMENT — PATIENT HEALTH QUESTIONNAIRE - PHQ9
2. FEELING DOWN, DEPRESSED OR HOPELESS: NOT AT ALL
SUM OF ALL RESPONSES TO PHQ9 QUESTIONS 1 & 2: 0
SUM OF ALL RESPONSES TO PHQ QUESTIONS 1-9: 0
1. LITTLE INTEREST OR PLEASURE IN DOING THINGS: NOT AT ALL
SUM OF ALL RESPONSES TO PHQ QUESTIONS 1-9: 0

## 2024-05-23 NOTE — PROGRESS NOTES
Identified pt with two pt identifiers (name and ). Reviewed chart in preparation for visit and have obtained necessary documentation.    Osito Ruff is a 63 y.o. male  Chief Complaint   Patient presents with    New Patient    Weight Management     /70 (Site: Right Upper Arm, Position: Sitting, Cuff Size: Large Adult)   Pulse 64   Temp 97.9 °F (36.6 °C) (Oral)   Resp 22   Ht 1.905 m (6' 3\")   Wt (!) 144.2 kg (317 lb 12.8 oz)   SpO2 95%   BMI 39.72 kg/m²     1. Have you been to the ER, urgent care clinic since your last visit?  Hospitalized since your last visit?no    2. Have you seen or consulted any other health care providers outside of the [facility] since your last visit?  Include any pap smears or colon screening. No    BMI - 39.6

## 2024-05-23 NOTE — PROGRESS NOTES
[facility] Progress Note: Initial Physician Visit     Osito Ruff is a 63 y.o. male who is here for medical screening for entering the [facility] Loss Program.   The patient denies any disease state that requires protein restriction.    CC: class 2 Obesity    Referred by pcp reason referred      He has a speech impediment since childhood  Brother is here as historian  The patient was dev delayed as a child        Weight History  I personally reviewed the Medical Screening [facility] completed by patient and scanned into media section of chart.  It includes duration of their obesity, maximum weight, goal weight  all of which give the context of their obesity AND a Family History of their obesity.    At [employer] he was very thin    At 32 he developed weight problem, he started working in  and started eating more things in the kitchen  Heaviest 321, now 317  Parents not overweight   His brother is overweight/obese        Weight loss History  I personally reviewed the Medical Screening [facility] completed by the patient and scanned into media section of chart.  It includes number of weight loss attempts, the weight loss program that patients was most successful using, and if they have any hx of anorectic medication use, including OTC supplements.     Has not tried anything so far for weight loss    Significant Medical History  Past Medical History:   Diagnosis Date    Atrial fibrillation (HCC)     Pacemaker          Patient's medicactions that may contribute  Metoprolol   Plan to become pregant within 6 months NA    I personally reviewed the Medical Screening [facility] completed by the patient and scanned into media section of chart.  This allows me to assess associated symptoms that are significant in the assessment of the patient's obesity and the patient's Past Medical History.    Current Outpatient Medications   Medication Sig Dispense Refill

## 2024-05-24 RX ORDER — FUROSEMIDE 40 MG/1
40 TABLET ORAL DAILY
Qty: 90 TABLET | Refills: 1 | Status: SHIPPED | OUTPATIENT
Start: 2024-05-24

## 2024-05-24 NOTE — TELEPHONE ENCOUNTER
Requested Prescriptions     Signed Prescriptions Disp Refills    furosemide (LASIX) 40 MG tablet 90 tablet 1     Sig: TAKE 1 TABLET BY MOUTH EVERY DAY     Authorizing Provider: DAVID OLMOS     Ordering User: THEODORE BLAKE     Verbal order per Dr. Olmos.    Future Appointments   Date Time Provider Department [place]   5/28/2024  8:40 AM David Olmos MD [facility]   5/28/2024  8:40 AM PACEMAKER3, BEN MATSON BS AMB   5/31/2024 10:20 AM Ed Abdul MD [facility]   6/3/2024 11:00 AM WEEKLY CLASS_[facility] AM[facility]   6/6/2024  3:00 PM Sheridan Turcios RD [facility] AMB   7/2/2024  1:00 PM Laura Villalba MD [facility] AMB   6/12/2025  9:20 AM PACEMAKER3, BEN MATSON BS AMB   6/12/2025  9:40 AM David Olmos MD [facility] AM[facility]

## 2024-05-28 ENCOUNTER — PROCEDURE VISIT (OUTPATIENT)
Age: 64
End: 2024-05-28
Payer: COMMERCIAL

## 2024-05-28 ENCOUNTER — OFFICE VISIT (OUTPATIENT)
Age: 64
End: 2024-05-28
Payer: COMMERCIAL

## 2024-05-28 VITALS
SYSTOLIC BLOOD PRESSURE: 116 MMHG | BODY MASS INDEX: 39.17 KG/M2 | RESPIRATION RATE: 18 BRPM | DIASTOLIC BLOOD PRESSURE: 80 MMHG | OXYGEN SATURATION: 97 % | WEIGHT: 315 LBS | HEIGHT: 75 IN | HEART RATE: 71 BPM

## 2024-05-28 DIAGNOSIS — I44.2 COMPLETE AV BLOCK DUE TO AV NODAL ABLATION (HCC): ICD-10-CM

## 2024-05-28 DIAGNOSIS — I97.190 COMPLETE AV BLOCK DUE TO AV NODAL ABLATION (HCC): ICD-10-CM

## 2024-05-28 DIAGNOSIS — I10 PRIMARY HYPERTENSION: ICD-10-CM

## 2024-05-28 DIAGNOSIS — Z95.0 PACEMAKER: Primary | ICD-10-CM

## 2024-05-28 DIAGNOSIS — I47.29 NSVT (NONSUSTAINED VENTRICULAR TACHYCARDIA) (HCC): ICD-10-CM

## 2024-05-28 DIAGNOSIS — R42 ORTHOSTATIC LIGHTHEADEDNESS: ICD-10-CM

## 2024-05-28 DIAGNOSIS — I48.19 PERSISTENT ATRIAL FIBRILLATION (HCC): ICD-10-CM

## 2024-05-28 DIAGNOSIS — Z79.01 ANTICOAGULATED: ICD-10-CM

## 2024-05-28 PROCEDURE — 93280 PM DEVICE PROGR EVAL DUAL: CPT | Performed by: INTERNAL MEDICINE

## 2024-05-28 PROCEDURE — 99214 OFFICE O/P EST MOD 30 MIN: CPT | Performed by: INTERNAL MEDICINE

## 2024-05-28 PROCEDURE — 3079F DIAST BP 80-89 MM HG: CPT | Performed by: INTERNAL MEDICINE

## 2024-05-28 PROCEDURE — 3074F SYST BP LT 130 MM HG: CPT | Performed by: INTERNAL MEDICINE

## 2024-05-28 RX ORDER — LOSARTAN POTASSIUM 25 MG/1
12.5 TABLET ORAL
Qty: 45 TABLET | Refills: 1 | Status: SHIPPED | OUTPATIENT
Start: 2024-05-28 | End: 2024-05-31 | Stop reason: ALTCHOICE

## 2024-05-28 NOTE — PROGRESS NOTES
heart size is within normal limits. There is a pacemaker present.   There is some calcification along the posterior wall of the left atrium   especially near the left pulmonary veins. This calcification could be related to   previous cardiac ablation. Coronary artery calcification is present.   PLEURA: No effusion or pneumothorax.   LUNGS: The lung windows and high resolution images demonstrate that there is   some respiratory motion and overall a shallow depth of inspiration on the   expiratory images. Expiratory images also demonstrate some mild respiratory   motion. The degree of inspiration on the expiratory images is actually greater   than that on the inspiratory images.     The prone images demonstrate no respiratory motion.     The lungs demonstrate no fibrosis or bronchiectasis. High-resolution images   demonstrate no evidence for interstitial lung disease.     The central airways are patent.     There is mild linear scarring in the lingula medially and in the anteromedial   segment of the left lower lobe similar to the prior examination..   INCIDENTALLY IMAGED UPPER ABDOMEN: Numerous small cysts are partially seen in   the left kidney. No adrenal lesions..   BONES: No destructive bone lesion.     Impression   1. The lungs demonstrate no evidence for fibrosis or bronchiectasis. No evidence   for interstitial lung disease.   2. Coronary artery calcification is present. This is greater than expected for   patient of this age.     Current Outpatient Medications   Medication Sig    furosemide (LASIX) 40 MG tablet TAKE 1 TABLET BY MOUTH EVERY DAY    apixaban (ELIQUIS) 5 MG TABS tablet Take 1 tablet by mouth 2 times daily    KLOR-CON M10 10 MEQ extended release tablet TAKE 1 TABLET BY MOUTH EVERY DAY    metoprolol succinate (TOPROL XL) 25 MG extended release tablet Take 1 tablet by mouth nightly    losartan (COZAAR) 25 MG tablet Take 0.5 tablets by mouth nightly (Dose decreased to 12.5 mg on 8-28-23.  To start

## 2024-05-28 NOTE — TELEPHONE ENCOUNTER
Cardiologist: Dr. Abdul    Future Appointments   Date Time Provider Department Center   5/31/2024 10:20 AM Ed Abdul MD [facility]   6/3/2024 11:00 [facility]   6/6/2024  3:00 PM [facility]   7/2/2024  1:00 PM Laura Villalba MD [facility]   6/12/2025  9:20 AM BEN LYONS [facility]   6/12/2025  9:40 AM [facility]       Requested Prescriptions     Signed Prescriptions Disp Refills    losartan (COZAAR) 25 MG tablet 45 tablet 1     Sig: Take 0.5 tablets by mouth nightly (Dose decreased to 12.5 mg on 8-28-23.  To start taking at night)     Authorizing Provider: ED ABDUL     Ordering User: SAMMY AVILA         Refills VO per Dr. Abdul.

## 2024-05-29 PROBLEM — I10 PRIMARY HYPERTENSION: Status: ACTIVE | Noted: 2024-05-29

## 2024-05-31 ENCOUNTER — OFFICE VISIT (OUTPATIENT)
Age: 64
End: 2024-05-31

## 2024-05-31 VITALS
HEIGHT: 75 IN | WEIGHT: 315 LBS | OXYGEN SATURATION: 99 % | BODY MASS INDEX: 39.17 KG/M2 | SYSTOLIC BLOOD PRESSURE: 122 MMHG | DIASTOLIC BLOOD PRESSURE: 80 MMHG | HEART RATE: 69 BPM

## 2024-05-31 DIAGNOSIS — I48.19 PERSISTENT ATRIAL FIBRILLATION WITH RAPID VENTRICULAR RESPONSE (HCC): ICD-10-CM

## 2024-05-31 DIAGNOSIS — Z95.0 PACEMAKER: Primary | ICD-10-CM

## 2024-05-31 RX ORDER — FUROSEMIDE 40 MG/1
40 TABLET ORAL DAILY
Qty: 90 TABLET | Refills: 1 | Status: SHIPPED | OUTPATIENT
Start: 2024-05-31

## 2024-05-31 RX ORDER — METOPROLOL SUCCINATE 25 MG/1
12.5 TABLET, EXTENDED RELEASE ORAL NIGHTLY
Qty: 45 TABLET | Refills: 1 | Status: SHIPPED | OUTPATIENT
Start: 2024-05-31

## 2024-05-31 ASSESSMENT — PATIENT HEALTH QUESTIONNAIRE - PHQ9
SUM OF ALL RESPONSES TO PHQ QUESTIONS 1-9: 0
2. FEELING DOWN, DEPRESSED OR HOPELESS: NOT AT ALL
1. LITTLE INTEREST OR PLEASURE IN DOING THINGS: NOT AT ALL
SUM OF ALL RESPONSES TO PHQ9 QUESTIONS 1 & 2: 0

## 2024-05-31 NOTE — PROGRESS NOTES
[facility]                                     DIVISION OF CARDIOLOGY                                                                             OFFICE NOTE                  JOHNSON FAJARDO M.D. , SHARIF            CAITLYN RAVI   1960  [id number]    Date/Time:  5/31/202411:16 AM      /80 (Site: Right Upper Arm, Position: Sitting, Cuff Size: Large Adult)   Pulse 69   Ht 1.905 m (6' 3\")   Wt (!) 145.6 kg (321 lb)   SpO2 99%   BMI 40.12 kg/m²        Wt Readings from Last 3 Encounters:   05/31/24 (!) 145.6 kg (321 lb)   05/28/24 (!) 145.2 kg (320 lb)   05/23/24 (!) 144.2 kg (317 lb 12.8 oz)          Lab Results   Component Value Date    CHOL 137 04/01/2024    TRIG 128 04/01/2024    HDL 50 04/01/2024    VLDL 23 04/01/2024              SUBJECTIVE:  He is doing okay but unfortunately remains dizzy standing.  Shortness of breath is unchanged.  No chest pain reported no palpitation presyncope or syncopal episodes.           Assessment/Plan    1.  Shortness of breath: Still with shortness of breath.     Previous ER visit for shortness of breath with chest CT showing no pulmonary embolism.    Status post echocardiogram in December 2023 with ejection fraction of 50 to 55% and moderate pulmonary hypertension with right ventricular systolic pressure estimate approximate 59 mmHg       Status post nuclear stress test December thousand 22 with no ischemia and apical infarct and ejection fractions to me to 48%.    Status post CTA coronary angiography in February 2024 this failed to reveal any significant coronary disease only mild plaque and calcifications but once again no flow-limiting stenosis    Therefore coronary disease not responsible for his shortness of breath in my opinion.    Continue same dose of potassium and Lasix.    EKG today reveals underlying atrial fibrillation ventricular paced rhythm.    To do follow with pulmonologist as per sleep planned.    2.  Dizziness: Seems to

## 2024-05-31 NOTE — PATIENT INSTRUCTIONS
Please STOP Losartan     Please DECREASE Toprol XL(metoprolol succinate) to 12.5mg nighly    Follow up in 3 months with an echocardiogram 1 week prior

## 2024-06-03 ENCOUNTER — NURSE ONLY (OUTPATIENT)
Age: 64
End: 2024-06-03

## 2024-06-03 VITALS
RESPIRATION RATE: 22 BRPM | DIASTOLIC BLOOD PRESSURE: 75 MMHG | BODY MASS INDEX: 39.17 KG/M2 | HEIGHT: 75 IN | SYSTOLIC BLOOD PRESSURE: 112 MMHG | WEIGHT: 315 LBS | OXYGEN SATURATION: 95 % | HEART RATE: 70 BPM | TEMPERATURE: 97.8 F

## 2024-06-03 DIAGNOSIS — I48.19 PERSISTENT ATRIAL FIBRILLATION WITH RAPID VENTRICULAR RESPONSE (HCC): ICD-10-CM

## 2024-06-03 DIAGNOSIS — E66.01 CLASS 2 SEVERE OBESITY DUE TO EXCESS CALORIES WITH SERIOUS COMORBIDITY AND BODY MASS INDEX (BMI) OF 39.0 TO 39.9 IN ADULT (HCC): Primary | ICD-10-CM

## 2024-06-03 DIAGNOSIS — I10 PRIMARY HYPERTENSION: ICD-10-CM

## 2024-06-03 ASSESSMENT — PATIENT HEALTH QUESTIONNAIRE - PHQ9
1. LITTLE INTEREST OR PLEASURE IN DOING THINGS: NOT AT ALL
SUM OF ALL RESPONSES TO PHQ QUESTIONS 1-9: 0
SUM OF ALL RESPONSES TO PHQ9 QUESTIONS 1 & 2: 0
SUM OF ALL RESPONSES TO PHQ QUESTIONS 1-9: 0
2. FEELING DOWN, DEPRESSED OR HOPELESS: NOT AT ALL

## 2024-06-03 NOTE — PROGRESS NOTES
Identified pt with two pt identifiers (name and ). Reviewed chart in preparation for visit and have obtained necessary documentation.    Osito Ruff is a 63 y.o. male  Chief Complaint   Patient presents with    Weight Management     /75 (Site: Right Upper Arm, Position: Sitting, Cuff Size: Large Adult)   Pulse 70   Temp 97.8 °F (36.6 °C) (Oral)   Resp 22   Ht 1.905 m (6' 3\")   Wt (!) 143 kg (315 lb 4.8 oz)   SpO2 95%   BMI 39.41 kg/m²     1. Have you been to the ER, urgent care clinic since your last visit?  Hospitalized since your last visit?no    2. Have you seen or consulted any other health care providers outside of the [facility] since your last visit?  Include any pap smears or colon screening. no

## 2024-06-03 NOTE — PROGRESS NOTES
Progress Note: Weekly Education Class in the [facility] Weight Loss Program         Patient is on Very Low Calorie Diet [] (4 meal replacements per day, 800 kcal/day)      Low Calorie Diet [x] (2-3 meal replacements per day, 9209-4888 kcal/day)    1) Did patient have any new symptoms or physical problems?   Yes []    No [x]    If yes, check & comment: weakness [], fatigue [], lightheadedness [x], headache [], cramps [], cold intolerance [], hair loss [], diarrhea [], constipation [],  NA [] other:                                 2) Has patient had any medical attention from other providers, urgent care or the emergency room this week?  Yes []  No [x]       NA [], If yes, why:                                       3) Any other sugar sweetened beverages consumed this week?   Yes [x]  No []    4) Did patient have any problems adhering to the diet? Yes [x]  No [] NA []    If yes, Vacation [], Celebrations [], Conferences [], Family Reunions [] other:                                                5) How many hours of sleep this week? 5.75-9.5    (range)  NA []    Number of meal replacements consumed daily? 3-4 (range)  NA []    Average ounces of water patient consumed daily this week (not including shakes)? 41     (divide the weekly total by 7)    Did you eat any food outside of the program? Yes [x] No []    Physical Activity Over the Past Week:    Cardio exercise: 90 min  Strength exercise: 7 workouts / week  Number of steps walked per day: 4060-1124    How has patient mood overall been this week? Sad [], Happy [], Stressed [], Tired [], Content [], NA [], other Good           Medications reconciled by nurse Yes [x]  No[]    Patient was given therapeutic recommendations for any noted side effects of their dietary approach based upon [facility] patient manual per providers recommendation.

## 2024-06-06 ENCOUNTER — OFFICE VISIT (OUTPATIENT)
Age: 64
End: 2024-06-06

## 2024-06-06 DIAGNOSIS — E66.01 CLASS 2 SEVERE OBESITY DUE TO EXCESS CALORIES WITH SERIOUS COMORBIDITY AND BODY MASS INDEX (BMI) OF 39.0 TO 39.9 IN ADULT (HCC): Primary | ICD-10-CM

## 2024-06-06 NOTE — PROGRESS NOTES
[facility]  Metabolic Program Initial Nutrition Consult    Date: 2024   Physician: Laura Villalba MD    Name: Osito Ruff  :  1960    Type of Plan: LCD    Weeks on Plan: week 1  Virtual visit was completed through Zoom.    ASSESSMENT:    Medications/Supplements:   Prior to Admission medications    Medication Sig Start Date End Date Taking? Authorizing Provider   metoprolol succinate (TOPROL XL) 25 MG extended release tablet Take 0.5 tablets by mouth nightly 24   Ed Abdul MD   furosemide (LASIX) 40 MG tablet Take 1 tablet by mouth daily 24   Ed Abdul MD   apixaban (ELIQUIS) 5 MG TABS tablet Take 1 tablet by mouth 2 times daily 4/3/24   Ed Abdul MD   KLOR-CON M10 10 MEQ extended release tablet TAKE 1 TABLET BY MOUTH EVERY DAY 24   Ed Abdul MD   cyclobenzaprine (FLEXERIL) 10 MG tablet Take 1 tablet by mouth 3 times daily as needed for Muscle spasms    Provider, MD Anca   acetaminophen (TYLENOL) 500 MG tablet Take by mouth every 6 hours as needed    Automatic Reconciliation, Ar       Anthropometrics:    Ht:6'3\"   Wt: 315#    IBW: 196#    %IBW: 160%     BMI:39    Category: obesity class 2    Pt presents today for initial nutrition consult for the [facility] [place] - Metabolic Program.      Exercise/Physical Activity:not reported    Started meal replacements:yes  If yes, how many per day:3    Aversions/side effects to meal replacements:none    Reported Diet History:  2 ND shakes a day.  1 ND bar a day and banana.  Dinner last night: pizza.      Eat at home most often.  Chinese.  Beef and rice.  Hamburgers and fries. Refraining from fried now.  Beverages: coke zero 1-2 daily.  Water 60 ounces a day.  Juice occasionally, no alcohol. No energy drinks. Cut out all regular ssb, was a regular coke at breakfast, sweet tea or lemonade lunch and dinner, and milk after dinner.  Sweet tooth.  Ice cream, cookies every

## 2024-06-17 ENCOUNTER — NURSE ONLY (OUTPATIENT)
Age: 64
End: 2024-06-17

## 2024-06-17 VITALS
TEMPERATURE: 98.2 F | DIASTOLIC BLOOD PRESSURE: 83 MMHG | SYSTOLIC BLOOD PRESSURE: 120 MMHG | BODY MASS INDEX: 39.17 KG/M2 | HEIGHT: 75 IN | WEIGHT: 315 LBS | RESPIRATION RATE: 22 BRPM | HEART RATE: 79 BPM | OXYGEN SATURATION: 95 %

## 2024-06-17 DIAGNOSIS — I10 PRIMARY HYPERTENSION: ICD-10-CM

## 2024-06-17 DIAGNOSIS — E66.01 CLASS 2 SEVERE OBESITY DUE TO EXCESS CALORIES WITH SERIOUS COMORBIDITY AND BODY MASS INDEX (BMI) OF 39.0 TO 39.9 IN ADULT (HCC): Primary | ICD-10-CM

## 2024-06-17 ASSESSMENT — PATIENT HEALTH QUESTIONNAIRE - PHQ9
SUM OF ALL RESPONSES TO PHQ9 QUESTIONS 1 & 2: 0
2. FEELING DOWN, DEPRESSED OR HOPELESS: NOT AT ALL
SUM OF ALL RESPONSES TO PHQ QUESTIONS 1-9: 0
1. LITTLE INTEREST OR PLEASURE IN DOING THINGS: NOT AT ALL
SUM OF ALL RESPONSES TO PHQ QUESTIONS 1-9: 0

## 2024-06-17 NOTE — PROGRESS NOTES
Identified pt with two pt identifiers (name and ). Reviewed chart in preparation for visit and have obtained necessary documentation.    Osito Ruff is a 63 y.o. male  Chief Complaint   Patient presents with    Weight Management     /83 (Site: Right Upper Arm, Position: Sitting, Cuff Size: Large Adult)   Pulse 79   Temp 98.2 °F (36.8 °C) (Oral)   Resp 22   Ht 1.905 m (6' 3\")   Wt (!) 143.7 kg (316 lb 12.8 oz)   SpO2 95%   BMI 39.60 kg/m²     1. Have you been to the ER, urgent care clinic since your last visit?  Hospitalized since your last visit?no    2. Have you seen or consulted any other health care providers outside of the [facility] since your last visit?  Include any pap smears or colon screening. no

## 2024-06-17 NOTE — PROGRESS NOTES
Progress Note: Weekly Education Class in the [facility] Weight Loss Program         Patient is on Very Low Calorie Diet [] (4 meal replacements per day, 800 kcal/day)      Low Calorie Diet [x] (2-3 meal replacements per day, 3540-9051 kcal/day)    1) Did patient have any new symptoms or physical problems?   Yes []    No X    If yes, check & comment: weakness [], fatigue [], lightheadedness [], headache [], cramps [], cold intolerance [], hair loss [], diarrhea [], constipation [],  NA [] other:                                 2) Has patient had any medical attention from other providers, urgent care or the emergency room this week?  Yes []  No [x]       NA [], If yes, why:                                       3) Any other sugar sweetened beverages consumed this week?   Yes []  No [x]    4) Did patient have any problems adhering to the diet? Yes []  No [] NA []    If yes, Vacation [], Celebrations [], Conferences [], Family Reunions [] other:                                                5) How many hours of sleep this week? 7.5-10    (range)  NA []    Number of meal replacements consumed daily? 4 (range)  NA []    Average ounces of water patient consumed daily this week (not including shakes)? 36     (divide the weekly total by 7)    Did you eat any food outside of the program? Yes [x] No []    Physical Activity Over the Past Week:    Cardio exercise: 245 min  Strength exercise: 7 workouts / week  Number of steps walked per day: 0370-6811    How has patient mood overall been this week? Sad [], Happy [], Stressed [], Tired [], Content [], NA [], other Good           Medications reconciled by nurse Yes [x]  No[]    Patient was given therapeutic recommendations for any noted side effects of their dietary approach based upon [facility] patient manual per providers recommendation.

## 2024-07-03 ENCOUNTER — TELEPHONE (OUTPATIENT)
Age: 64
End: 2024-07-03

## 2024-07-07 PROCEDURE — 93294 REM INTERROG EVL PM/LDLS PM: CPT | Performed by: INTERNAL MEDICINE

## 2024-07-16 ENCOUNTER — OFFICE VISIT (OUTPATIENT)
Age: 64
End: 2024-07-16
Payer: COMMERCIAL

## 2024-07-16 VITALS
RESPIRATION RATE: 22 BRPM | SYSTOLIC BLOOD PRESSURE: 118 MMHG | BODY MASS INDEX: 39.17 KG/M2 | HEIGHT: 75 IN | HEART RATE: 68 BPM | OXYGEN SATURATION: 96 % | TEMPERATURE: 98.4 F | DIASTOLIC BLOOD PRESSURE: 79 MMHG | WEIGHT: 315 LBS

## 2024-07-16 DIAGNOSIS — I10 PRIMARY HYPERTENSION: ICD-10-CM

## 2024-07-16 DIAGNOSIS — I48.19 PERSISTENT ATRIAL FIBRILLATION WITH RAPID VENTRICULAR RESPONSE (HCC): ICD-10-CM

## 2024-07-16 DIAGNOSIS — E66.01 CLASS 2 SEVERE OBESITY DUE TO EXCESS CALORIES WITH SERIOUS COMORBIDITY AND BODY MASS INDEX (BMI) OF 39.0 TO 39.9 IN ADULT (HCC): Primary | ICD-10-CM

## 2024-07-16 DIAGNOSIS — D68.69 SECONDARY HYPERCOAGULABLE STATE (HCC): ICD-10-CM

## 2024-07-16 PROCEDURE — 99214 OFFICE O/P EST MOD 30 MIN: CPT | Performed by: FAMILY MEDICINE

## 2024-07-16 PROCEDURE — 3078F DIAST BP <80 MM HG: CPT | Performed by: FAMILY MEDICINE

## 2024-07-16 PROCEDURE — 3074F SYST BP LT 130 MM HG: CPT | Performed by: FAMILY MEDICINE

## 2024-07-16 RX ORDER — SEMAGLUTIDE 0.25 MG/.5ML
0.25 INJECTION, SOLUTION SUBCUTANEOUS
Qty: 2 ML | Refills: 0 | Status: SHIPPED | OUTPATIENT
Start: 2024-07-16

## 2024-07-16 ASSESSMENT — PATIENT HEALTH QUESTIONNAIRE - PHQ9
SUM OF ALL RESPONSES TO PHQ9 QUESTIONS 1 & 2: 0
1. LITTLE INTEREST OR PLEASURE IN DOING THINGS: NOT AT ALL
SUM OF ALL RESPONSES TO PHQ QUESTIONS 1-9: 0
2. FEELING DOWN, DEPRESSED OR HOPELESS: NOT AT ALL
SUM OF ALL RESPONSES TO PHQ QUESTIONS 1-9: 0

## 2024-07-16 NOTE — PROGRESS NOTES
[facility] Progress Note:   F/up Physician Visit    CC: Weight Management      Osito Ruff is a 64 y.o. male who is here for his  f/up physician visit for the VLCD / LCD Program.  May 317  Now 318    He is still eating ice cream, pretzels, atkins peanut butter cups  He is still struggling with appetite    He has afib so qsymia, phentermine and contrave are not options. They will stimulate the heart too much            7/16/2024     2:29 PM 7/16/2024     2:00 PM 6/17/2024    11:05 AM 6/3/2024    11:03 AM 5/31/2024    10:19 AM 5/28/2024     8:45 AM 5/23/2024     1:36 PM   Weight Metrics   Weight 318 lb 8 oz  316 lb 12.8 oz 315 lb 4.8 oz 321 lb 320 lb 317 lb 12.8 oz   Neck (Inches)  20.25 in        Waist Measure Inches  57.5 in        BMI (Calculated) 39.9 kg/m2  39.7 kg/m2 39.5 kg/m2 40.2 kg/m2 40.1 kg/m2 39.8 kg/m2          No data to display                   Current Outpatient Medications   Medication Sig Dispense Refill    Semaglutide-Weight Management (WEGOVY) 0.25 MG/0.5ML SOAJ SC injection Inject 0.25 mg into the skin every 7 days 2 mL 0    furosemide (LASIX) 40 MG tablet Take 1 tablet by mouth daily 90 tablet 1    apixaban (ELIQUIS) 5 MG TABS tablet Take 1 tablet by mouth 2 times daily 180 tablet 1    KLOR-CON M10 10 MEQ extended release tablet TAKE 1 TABLET BY MOUTH EVERY DAY 90 tablet 1    cyclobenzaprine (FLEXERIL) 10 MG tablet Take 1 tablet by mouth 3 times daily as needed for Muscle spasms      acetaminophen (TYLENOL) 500 MG tablet Take by mouth every 6 hours as needed       No current facility-administered medications for this visit.          Participation   Did you attend clinic and class last week? no    Review of Systems  Since your last visit, have you experienced any complications? no  If yes, please list:       Are you taking an appetite suppressant? yes  If so, is there any Chest Pain, Palpitations or Dizziness?      HUNGER CONTROL: poor    BP Readings from Last 3

## 2024-07-16 NOTE — PROGRESS NOTES
Identified pt with two pt identifiers (name and ). Reviewed chart in preparation for visit and have obtained necessary documentation.    Osito Ruff is a 64 y.o. male  Chief Complaint   Patient presents with    Weight Management     1 month follow up     /79 (Site: Right Upper Arm, Position: Sitting, Cuff Size: Large Adult)   Pulse 68   Temp 98.4 °F (36.9 °C) (Oral)   Resp 22   Ht 1.905 m (6' 3\")   Wt (!) 144.5 kg (318 lb 8 oz)   SpO2 96%   BMI 39.81 kg/m²     1. Have you been to the ER, urgent care clinic since your last visit?  Hospitalized since your last visit?no    2. Have you seen or consulted any other health care providers outside of the [facility] since your last visit?  Include any pap smears or colon screening. No    BMI -

## 2024-07-22 ENCOUNTER — CLINICAL DOCUMENTATION (OUTPATIENT)
Age: 64
End: 2024-07-22

## 2024-07-22 NOTE — PROGRESS NOTES
Prior Authorization for Wegovy 0.25 mg/0.5 ml sent via [employer] to [facility].    Case ID: [id number].    Awaiting determination.

## 2024-07-22 NOTE — PROGRESS NOTES
Prior Authorization for Wegovy 0.25 mg/0.5 ml APPROVED by [facility].    7/22/2024 - 2/3/2025    Case #: [id number]    Patient notified via [employer] message.

## 2024-07-25 ENCOUNTER — OFFICE VISIT (OUTPATIENT)
Age: 64
End: 2024-07-25
Payer: COMMERCIAL

## 2024-07-25 ENCOUNTER — TELEPHONE (OUTPATIENT)
Age: 64
End: 2024-07-25

## 2024-07-25 VITALS
RESPIRATION RATE: 18 BRPM | DIASTOLIC BLOOD PRESSURE: 68 MMHG | SYSTOLIC BLOOD PRESSURE: 126 MMHG | HEIGHT: 75 IN | OXYGEN SATURATION: 98 % | WEIGHT: 315 LBS | BODY MASS INDEX: 39.17 KG/M2 | TEMPERATURE: 98 F | HEART RATE: 77 BPM

## 2024-07-25 DIAGNOSIS — L84 FOOT CALLUS: ICD-10-CM

## 2024-07-25 DIAGNOSIS — E66.01 SEVERE OBESITY (BMI 35.0-39.9) WITH COMORBIDITY (HCC): ICD-10-CM

## 2024-07-25 DIAGNOSIS — R06.09 DOE (DYSPNEA ON EXERTION): ICD-10-CM

## 2024-07-25 DIAGNOSIS — R42 DIZZINESS: ICD-10-CM

## 2024-07-25 DIAGNOSIS — I48.19 PERSISTENT ATRIAL FIBRILLATION (HCC): ICD-10-CM

## 2024-07-25 DIAGNOSIS — M21.6X2 ACQUIRED ADDUCTION DEFORMITY OF LEFT FOOT: ICD-10-CM

## 2024-07-25 DIAGNOSIS — M79.673 FOOT ARCH PAIN, UNSPECIFIED LATERALITY: Primary | ICD-10-CM

## 2024-07-25 PROCEDURE — 3078F DIAST BP <80 MM HG: CPT | Performed by: INTERNAL MEDICINE

## 2024-07-25 PROCEDURE — 3074F SYST BP LT 130 MM HG: CPT | Performed by: INTERNAL MEDICINE

## 2024-07-25 PROCEDURE — 99215 OFFICE O/P EST HI 40 MIN: CPT | Performed by: INTERNAL MEDICINE

## 2024-07-25 ASSESSMENT — ENCOUNTER SYMPTOMS: SHORTNESS OF BREATH: 1

## 2024-07-25 NOTE — TELEPHONE ENCOUNTER
Spoke with the pharmacist verified patient using 2 patient identifiers. She was made aware that the Wegovy has an approval as of 07/22/2024. She states that it is still showing that it needs a prior authorization.    She was informed that it has been approved as of 07/22/2024-02/03/2025. She re-ran the claim and this time it came back approved but medication will need to be ordered.

## 2024-07-25 NOTE — PROGRESS NOTES
Subjective    Osito Ruff is a 64 y.o. male who presents today for the following:  Chief Complaint   Patient presents with    Edema     Legs         History of Present Illness  The patient presents for evaluation of multiple medical concerns. He is accompanied by his brother.    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 His employer has expressed concerns about his condition and is considering short-term disability benefits.    He experiences hip pain when moving or stepping in certain ways. A CT scan revealed multiple cysts in his left kidney.    He is experiencing issues with his feet and legs, which hinders his ability to walk. He has been wearing compression stockings for 30 years to maintain circulation in his legs. He experienced swelling and skin breakouts, for which he took medication and stayed off his feet for a month. His legs feel heavy when he walks, and he  does not wear a foot brace on his left leg.    He experiences dizziness when transitioning from sitting to standing. He nearly fell in a parking lot after breakfast last week. His job involves lifting, standing, and walking, which he believes may be contributing to his dizziness. He consumes 64 ounces of water daily and sometimes 1 can of diet Coke. His medication was adjusted two weeks ago by Dr. Abdul, suspecting orthostatic hypotension. He was advised to discontinue losartan and decrease metoprolol to 12.5. He continues to experience dizziness, but not daily. He is currently experiencing mild dizziness.    He has been prescribed Wegovy for weight loss. Needs to pick this     Patient has chronic SOB on exertion. 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 He was given shoes, and his right foot got corrected, but his left ankle did not. He has dyspnea. He has another appointment with a specialist at pulmonary

## 2024-07-25 NOTE — TELEPHONE ENCOUNTER
Returned call to patient and verified using 2 patient identifiers. Pt was made aware that there is a prior authorization approval on file as of 07/22/2024 and approved until 02/02/2025.    Pt states that [facility] was not aware of this and if the office could contact them. Pt made aware will follow up with [facility].

## 2024-07-25 NOTE — PATIENT INSTRUCTIONS
Recursos financieros del [facility]*  (Llame a [facility]/211 si necesita más recursos)    Atención médica  Asistencia financiera de [facility]  Lo que ofrecen: el Programa de asistencia financiera de [facility] ayuda a pacientes sin seguro que no califican para el seguro de margret patrocinado por el gobierno y que no pueden pagar de jesus atención médica. Los pacientes con seguro también pueden reunir los requisitos dependiendo de los ingresos familiares, el tamaño de la anton y las necesidades médicas.  Número de teléfono: [phone]  Cómo aplicar al Programa de asistencia financiera de [facility]:  Opción 1: para solicitar asistencia financiera, un paciente (o de jesus anton u otro proveedor) debe completar la solicitud de asistencia financiera. Se pueden obtener copias de la solicitud de asistencia financiera y obtener el Programa de Asistencia Financiera (FAP) de forma gratuita llamando al departamento de atención al cliente de [facility] al [phone].  Opción 2: la solicitud de asistencia financiera y la política pueden obtenerse de forma gratuita descargando susannah copia del sitio web de [facility]:  [email or web address]  Las solicitudes están disponibles en varios idiomas en el sitio web.    Asistencia financiera para la atención de la margret de [facility]  Lo que ofrecen: [facility] tiene susannah Política de asistencia financiera que proporciona atención de la margret gratuita o con descuento a pacientes que califican.  Sitio web: [email or web address]  Número de teléfono de los asesores sobre beneficios para pacientes: [phone]  Asistencia financiera para la margret de [facility]  Lo que ofrecen: ayuda para comprender susannah factura, averiguar lo que paga el seguro, solicitar ayuda financiera u organizar un plan de pago.  Sitio web: [email or web address]  [facility] telefó[facility] Centero de asesoramiento

## 2024-07-25 NOTE — PROGRESS NOTES
\"Have you been to the ER, urgent care clinic since your last visit?  Hospitalized since your last visit?\"    NO    “Have you seen or consulted any other health care providers outside of [facility] since your last visit?”    NO      “Have you had a colorectal cancer screening such as a colonoscopy/FIT/Cologuard?    NO    No colonoscopy on file  No cologuard on file  No FIT/FOBT on file   No flexible sigmoidoscopy on file         Click Here for Release of Records Request

## 2024-08-02 ENCOUNTER — NURSE ONLY (OUTPATIENT)
Age: 64
End: 2024-08-02

## 2024-08-02 VITALS
DIASTOLIC BLOOD PRESSURE: 85 MMHG | HEIGHT: 75 IN | OXYGEN SATURATION: 97 % | RESPIRATION RATE: 18 BRPM | WEIGHT: 315 LBS | SYSTOLIC BLOOD PRESSURE: 125 MMHG | BODY MASS INDEX: 39.17 KG/M2 | TEMPERATURE: 98 F | HEART RATE: 88 BPM

## 2024-08-02 DIAGNOSIS — I10 PRIMARY HYPERTENSION: ICD-10-CM

## 2024-08-02 DIAGNOSIS — E66.01 CLASS 3 SEVERE OBESITY DUE TO EXCESS CALORIES WITH SERIOUS COMORBIDITY AND BODY MASS INDEX (BMI) OF 40.0 TO 44.9 IN ADULT (HCC): Primary | ICD-10-CM

## 2024-08-02 DIAGNOSIS — I48.19 PERSISTENT ATRIAL FIBRILLATION WITH RAPID VENTRICULAR RESPONSE (HCC): ICD-10-CM

## 2024-08-02 ASSESSMENT — PATIENT HEALTH QUESTIONNAIRE - PHQ9
SUM OF ALL RESPONSES TO PHQ QUESTIONS 1-9: 0
2. FEELING DOWN, DEPRESSED OR HOPELESS: NOT AT ALL
1. LITTLE INTEREST OR PLEASURE IN DOING THINGS: NOT AT ALL
SUM OF ALL RESPONSES TO PHQ QUESTIONS 1-9: 0
SUM OF ALL RESPONSES TO PHQ9 QUESTIONS 1 & 2: 0

## 2024-08-02 NOTE — PROGRESS NOTES
Identified pt with two pt identifiers (name and ). Reviewed chart in preparation for visit and have obtained necessary documentation.    Osito Ruff is a 64 y.o. male  Chief Complaint   Patient presents with    Weight Management     LCD/ Deejay     Medication Management     wegovy     There were no vitals taken for this visit.    1. Have you been to the ER, urgent care clinic since your last visit?  Hospitalized since your last visit?no    2. Have you seen or consulted any other health care providers outside of the [facility] since your last visit?  Include any pap smears or colon screening. No    Patient attended triage but did not bring homework form. Patient instructed to email or fax completed homework form to us. Patient informed that not bringing the homework form can result in not being seen next time.

## 2024-08-09 NOTE — PROGRESS NOTES
Nurse note from patient's weekly  / LCD / Maintenance class was reviewed.  Pertinent medical concerns were:   reviewed   No homework  BP Readings from Last 3 Encounters:   08/02/24 125/85   07/25/24 126/68   07/16/24 118/79       Failed to redirect to the Timeline version of the [facility] SmartLink.    Current Outpatient Medications   Medication Sig Dispense Refill    Semaglutide-Weight Management (WEGOVY) 0.25 MG/0.5ML SOAJ SC injection Inject 0.25 mg into the skin every 7 days 2 mL 0    furosemide (LASIX) 40 MG tablet Take 1 tablet by mouth daily 90 tablet 1    apixaban (ELIQUIS) 5 MG TABS tablet Take 1 tablet by mouth 2 times daily 180 tablet 1    KLOR-CON M10 10 MEQ extended release tablet TAKE 1 TABLET BY MOUTH EVERY DAY 90 tablet 1    cyclobenzaprine (FLEXERIL) 10 MG tablet Take 1 tablet by mouth 3 times daily as needed for Muscle spasms      acetaminophen (TYLENOL) 500 MG tablet Take by mouth every 6 hours as needed       No current facility-administered medications for this visit.

## 2024-08-16 ENCOUNTER — NURSE ONLY (OUTPATIENT)
Age: 64
End: 2024-08-16

## 2024-08-16 VITALS
TEMPERATURE: 97.8 F | DIASTOLIC BLOOD PRESSURE: 86 MMHG | SYSTOLIC BLOOD PRESSURE: 123 MMHG | WEIGHT: 315 LBS | RESPIRATION RATE: 18 BRPM | BODY MASS INDEX: 39.17 KG/M2 | HEART RATE: 84 BPM | OXYGEN SATURATION: 97 % | HEIGHT: 75 IN

## 2024-08-16 DIAGNOSIS — I10 PRIMARY HYPERTENSION: ICD-10-CM

## 2024-08-16 DIAGNOSIS — E66.01 CLASS 3 SEVERE OBESITY DUE TO EXCESS CALORIES WITH SERIOUS COMORBIDITY AND BODY MASS INDEX (BMI) OF 40.0 TO 44.9 IN ADULT (HCC): Primary | ICD-10-CM

## 2024-08-16 DIAGNOSIS — I48.19 PERSISTENT ATRIAL FIBRILLATION WITH RAPID VENTRICULAR RESPONSE (HCC): ICD-10-CM

## 2024-08-16 ASSESSMENT — PATIENT HEALTH QUESTIONNAIRE - PHQ9
SUM OF ALL RESPONSES TO PHQ QUESTIONS 1-9: 0
SUM OF ALL RESPONSES TO PHQ9 QUESTIONS 1 & 2: 0
SUM OF ALL RESPONSES TO PHQ QUESTIONS 1-9: 0
1. LITTLE INTEREST OR PLEASURE IN DOING THINGS: NOT AT ALL
SUM OF ALL RESPONSES TO PHQ QUESTIONS 1-9: 0
SUM OF ALL RESPONSES TO PHQ QUESTIONS 1-9: 0
2. FEELING DOWN, DEPRESSED OR HOPELESS: NOT AT ALL

## 2024-08-16 NOTE — PROGRESS NOTES
Identified patient with two patient identifiers (name and ). Reviewed chart in preparation for visit and have obtained necessary documentation.    Osito Ruff is a 64 y.o. male  Chief Complaint   Patient presents with    Weight Management     Triage/ANNA/emmanuel     /86 (Site: Right Upper Arm, Position: Sitting, Cuff Size: Large Adult)   Pulse 84   Temp 97.8 °F (36.6 °C) (Oral)   Resp 18   Ht 1.905 m (6' 3\")   Wt (!) 145.7 kg (321 lb 3.2 oz)   SpO2 97%   BMI 40.15 kg/m²     1. Have you been to the ER, urgent care clinic since your last visit?  Hospitalized since your last visit?no    2. Have you seen or consulted any other health care providers outside of the [facility] since your last visit?  Include any pap smears or colon screening. No    Pt stated they have felt dizzy and lightheaded after most recent wegovy injection. Discussed nutrition and signs and symptoms to report to a physician. Pt expressed understanding.

## 2024-08-18 NOTE — PROGRESS NOTES
Dependent  Battery OLEG < 6 months  Recent CHF  Echo normal LVEF      Future Appointments   Date Time Provider Jam Echevarria   12/19/2022  1:45 PM REMOTE1, SARMAD SALAS BS AMB   1/13/2023  9:00 AM PACEMAKER3, SARMAD SALAS BS AMB   1/13/2023  9:30 AM WOUND CHECKS, SARMAD SALAS BS AMB   1/24/2023  7:45 AM CARDIAC NUCLEAR STRESS Brenda Ng [address] [facility]   1/24/2023  9:00 AM [facility] PSYCHIATRIC [place] RM [address] [facility]   1/25/2023  7:30 AM [facility] [place] INJ RM 1 [facility]   2/1/2023 10:20 AM MD MARITZA Camar[place] BS AMB   3/14/2023 10:00 AM ECHO, SARMAD SALAS BS AMB   3/14/2023 11:20 AM Jean Carlos Castañeda MD CAVREY [facility] AMB   3/28/2023  1:00 PM PACEMAKER3, SARMAD SALAS BS AMB   3/28/2023  1:20 PM MD MARITZA Astudillo BS AMB
Patient previously scheduled for generator change on 1/4/23 at 10:30 am
Warm

## 2024-08-20 RX ORDER — POTASSIUM CHLORIDE 750 MG/1
TABLET, EXTENDED RELEASE ORAL
Qty: 90 TABLET | Refills: 3 | Status: SHIPPED | OUTPATIENT
Start: 2024-08-20

## 2024-08-20 NOTE — TELEPHONE ENCOUNTER
Cardiologist: Dr. Abdul    Future Appointments   Date Time Provider Department Center   8/22/2024  9:30 AM Eduarda Candelaria APRN - NP [facility]   8/23/2024 11:20 AM Ed Abdul MD [facility]   8/29/2024  1:15 PM [facility]   6/12/2025  9:20 AM BEN LYONS [facility]   6/12/2025  9:40 AM Arben Olmos MD [facility]       Requested Prescriptions     Signed Prescriptions Disp Refills    potassium chloride (KLOR-CON M10) 10 MEQ extended release tablet 90 tablet 3     Sig: TAKE 1 TABLET BY MOUTH EVERY DAY     Authorizing Provider: ED ABDUL     Ordering User: SAMMY AVILA         Refjosé VO per Dr. Abdul.

## 2024-08-22 ENCOUNTER — OFFICE VISIT (OUTPATIENT)
Age: 64
End: 2024-08-22
Payer: COMMERCIAL

## 2024-08-22 VITALS
WEIGHT: 315 LBS | TEMPERATURE: 98 F | SYSTOLIC BLOOD PRESSURE: 120 MMHG | HEART RATE: 79 BPM | HEIGHT: 75 IN | BODY MASS INDEX: 39.17 KG/M2 | DIASTOLIC BLOOD PRESSURE: 70 MMHG | RESPIRATION RATE: 19 BRPM | OXYGEN SATURATION: 98 %

## 2024-08-22 DIAGNOSIS — I48.19 PERSISTENT ATRIAL FIBRILLATION (HCC): ICD-10-CM

## 2024-08-22 DIAGNOSIS — R42 DIZZINESS: ICD-10-CM

## 2024-08-22 DIAGNOSIS — R06.09 DOE (DYSPNEA ON EXERTION): ICD-10-CM

## 2024-08-22 DIAGNOSIS — M79.673 FOOT ARCH PAIN, UNSPECIFIED LATERALITY: Primary | ICD-10-CM

## 2024-08-22 DIAGNOSIS — E11.65 CONTROLLED TYPE 2 DIABETES MELLITUS WITH HYPERGLYCEMIA, WITHOUT LONG-TERM CURRENT USE OF INSULIN (HCC): ICD-10-CM

## 2024-08-22 LAB — HBA1C MFR BLD: 6.4 %

## 2024-08-22 PROCEDURE — 3051F HG A1C>EQUAL 7.0%<8.0%: CPT | Performed by: INTERNAL MEDICINE

## 2024-08-22 PROCEDURE — 99214 OFFICE O/P EST MOD 30 MIN: CPT | Performed by: INTERNAL MEDICINE

## 2024-08-22 PROCEDURE — 83036 HEMOGLOBIN GLYCOSYLATED A1C: CPT | Performed by: INTERNAL MEDICINE

## 2024-08-22 PROCEDURE — 3078F DIAST BP <80 MM HG: CPT | Performed by: INTERNAL MEDICINE

## 2024-08-22 PROCEDURE — 3074F SYST BP LT 130 MM HG: CPT | Performed by: INTERNAL MEDICINE

## 2024-08-22 RX ORDER — METOPROLOL SUCCINATE 25 MG/1
12.5 TABLET, EXTENDED RELEASE ORAL NIGHTLY
COMMUNITY
Start: 2024-08-13 | End: 2024-08-22

## 2024-08-22 ASSESSMENT — ENCOUNTER SYMPTOMS: SHORTNESS OF BREATH: 1

## 2024-08-22 NOTE — PROGRESS NOTES
Chief Complaint   Patient presents with    Atrial Fibrillation               \"Have you been to the ER, urgent care clinic since your last visit?  Hospitalized since your last visit?\"    NO    “Have you seen or consulted any other health care providers outside of [facility] since your last visit?”    NO      “Have you had a colorectal cancer screening such as a colonoscopy/FIT/Cologuard?    NO    No colonoscopy on file  No cologuard on file  No FIT/FOBT on file   No flexible sigmoidoscopy on file         Click Here for Release of Records Request    
Negative.    Musculoskeletal:  Positive for arthralgias and gait problem.   Neurological:  Positive for headaches.   Psychiatric/Behavioral: Negative.           Objective    Physical Exam   Physical Exam  Patient is in no acute distress.  Lungs are clear to auscultation bilaterally. Patient is tachypneic with activity   Heart sounds S1 and S2 are heard, with afib  No lower extremity edema is present. There is tenderness to palpation of the left lower leg, which patient reports as baseline.      Assessment & Plan    Assessment & Plan  1. Headache.  He reports waking up with an achy headache. No nausea or throbbing pain was noted. He declined the offer for Tylenol and mentioned that drinking liquids helps alleviate the headache. No further treatment was recommended at this time.    2. Hip pain.  He continues to experience constant hip pain, which affects his ability to walk. Previous CT scans revealed a cyst in the kidneys but not in the hip. He was advised to continue wearing compression stockings to maintain circulation in his legs. No new treatment was initiated.    3. Feet pain.  He reports ongoing feet pain and swelling, which has been disabling. He has an upcoming appointment with a podiatrist on September 18. He was advised to follow up with the podiatrist for further evaluation and management.    4. Dizziness.  He experiences dizziness when transitioning from sitting to standing. This may be related to lower blood pressure. He was advised to monitor his symptoms and report any worsening.    5. Shortness of breath.  He has ongoing shortness of breath, which is being managed by his cardiologist. He recently saw a pulmonary specialist and is scheduled to follow up in one month. Blood work, including a nitro BNP, was ordered to assess fluid retention around the heart and lungs. Results are pending.    6. Diabetes Mellitus.  His A1c level has improved to 6.4 from 7.1, indicating that dietary modifications and the

## 2024-08-23 ENCOUNTER — OFFICE VISIT (OUTPATIENT)
Age: 64
End: 2024-08-23
Payer: COMMERCIAL

## 2024-08-23 VITALS
HEIGHT: 75 IN | DIASTOLIC BLOOD PRESSURE: 88 MMHG | SYSTOLIC BLOOD PRESSURE: 130 MMHG | WEIGHT: 315 LBS | HEART RATE: 68 BPM | BODY MASS INDEX: 39.17 KG/M2 | RESPIRATION RATE: 16 BRPM | OXYGEN SATURATION: 96 %

## 2024-08-23 DIAGNOSIS — I48.19 PERSISTENT ATRIAL FIBRILLATION WITH RAPID VENTRICULAR RESPONSE (HCC): Primary | ICD-10-CM

## 2024-08-23 PROCEDURE — 3079F DIAST BP 80-89 MM HG: CPT | Performed by: SPECIALIST

## 2024-08-23 PROCEDURE — 93000 ELECTROCARDIOGRAM COMPLETE: CPT | Performed by: SPECIALIST

## 2024-08-23 PROCEDURE — 3075F SYST BP GE 130 - 139MM HG: CPT | Performed by: SPECIALIST

## 2024-08-23 PROCEDURE — 99214 OFFICE O/P EST MOD 30 MIN: CPT | Performed by: SPECIALIST

## 2024-08-23 ASSESSMENT — PATIENT HEALTH QUESTIONNAIRE - PHQ9
SUM OF ALL RESPONSES TO PHQ QUESTIONS 1-9: 0
2. FEELING DOWN, DEPRESSED OR HOPELESS: NOT AT ALL
SUM OF ALL RESPONSES TO PHQ QUESTIONS 1-9: 0
1. LITTLE INTEREST OR PLEASURE IN DOING THINGS: NOT AT ALL
SUM OF ALL RESPONSES TO PHQ QUESTIONS 1-9: 0
SUM OF ALL RESPONSES TO PHQ9 QUESTIONS 1 & 2: 0
SUM OF ALL RESPONSES TO PHQ QUESTIONS 1-9: 0

## 2024-08-23 NOTE — PATIENT INSTRUCTIONS
Please STOP Losartan    Please START Entresto 24/26 twice a day    Please have labs in 1 week    Follow up with Dr. Abdul in 3-4 weeks    Move appointment with Dr. Olmos sooner

## 2024-08-23 NOTE — PROGRESS NOTES
chloride (KLOR-CON M10) 10 MEQ extended release tablet TAKE 1 TABLET BY MOUTH EVERY DAY 90 tablet 3    Semaglutide-Weight Management (WEGOVY) 0.25 MG/0.5ML SOAJ SC injection Inject 0.25 mg into the skin every 7 days 2 mL 0    furosemide (LASIX) 40 MG tablet Take 1 tablet by mouth daily 90 tablet 1    apixaban (ELIQUIS) 5 MG TABS tablet Take 1 tablet by mouth 2 times daily 180 tablet 1    acetaminophen (TYLENOL) 500 MG tablet Take by mouth every 6 hours as needed      cyclobenzaprine (FLEXERIL) 10 MG tablet Take 1 tablet by mouth 3 times daily as needed for Muscle spasms (Patient not taking: Reported on 8/23/2024)       No current facility-administered medications for this visit.              Lab Results   Component Value Date/Time    CHOL 137 04/01/2024 11:47 AM    CHOL 141 10/28/2021 11:20 AM    HDL 50 04/01/2024 11:47 AM    LDL 64 04/01/2024 11:47 AM    VLDL 23 04/01/2024 11:47 AM    VLDL 20 10/28/2021 11:20 AM       Lab Results   Component Value Date/Time     04/01/2024 11:47 AM    K 4.4 04/01/2024 11:47 AM     04/01/2024 11:47 AM    CO2 23 04/01/2024 11:47 AM    BUN 13 04/01/2024 11:47 AM    GFRAA 108 10/28/2021 11:20 AM       Lab Results   Component Value Date/Time    ALT 38 04/01/2024 11:47 AM       Lab Results   Component Value Date/Time    WBC 8.6 04/01/2024 11:47 AM    HGB 15.9 04/01/2024 11:47 AM    HCT 46.6 04/01/2024 11:47 AM     04/01/2024 11:47 AM    MCV 98 04/01/2024 11:47 AM       Lab Results   Component Value Date/Time    TSH 1.090 04/01/2024 11:47 AM         Lab Results   Component Value Date/Time    CHOL 137 04/01/2024 11:47 AM    CHOL 114 11/07/2023 11:33 AM    CHOL 141 10/28/2021 11:20 AM    HDL 50 04/01/2024 11:47 AM    HDL 38 11/07/2023 11:33 AM    HDL 45 10/28/2021 11:20 AM    LDL 64 04/01/2024 11:47 AM    LDL 55 11/07/2023 11:33 AM    LDL 76 10/28/2021 11:20 AM                Please note that this dictation was completed with [employer], the [employer] voice recognition

## 2024-08-28 ENCOUNTER — OFFICE VISIT (OUTPATIENT)
Age: 64
End: 2024-08-28
Payer: COMMERCIAL

## 2024-08-28 VITALS
BODY MASS INDEX: 39.17 KG/M2 | HEIGHT: 75 IN | RESPIRATION RATE: 20 BRPM | WEIGHT: 315 LBS | DIASTOLIC BLOOD PRESSURE: 72 MMHG | HEART RATE: 76 BPM | OXYGEN SATURATION: 97 % | SYSTOLIC BLOOD PRESSURE: 110 MMHG

## 2024-08-28 DIAGNOSIS — I44.2 COMPLETE AV BLOCK DUE TO AV NODAL ABLATION (HCC): ICD-10-CM

## 2024-08-28 DIAGNOSIS — D68.69 SECONDARY HYPERCOAGULABLE STATE (HCC): ICD-10-CM

## 2024-08-28 DIAGNOSIS — I97.190 COMPLETE AV BLOCK DUE TO AV NODAL ABLATION (HCC): ICD-10-CM

## 2024-08-28 DIAGNOSIS — R42 ORTHOSTATIC DIZZINESS: ICD-10-CM

## 2024-08-28 DIAGNOSIS — I10 PRIMARY HYPERTENSION: ICD-10-CM

## 2024-08-28 DIAGNOSIS — I50.22 CHRONIC SYSTOLIC (CONGESTIVE) HEART FAILURE (HCC): ICD-10-CM

## 2024-08-28 DIAGNOSIS — Z79.01 ANTICOAGULATED: ICD-10-CM

## 2024-08-28 DIAGNOSIS — Z95.0 PACEMAKER: Primary | ICD-10-CM

## 2024-08-28 PROCEDURE — 3074F SYST BP LT 130 MM HG: CPT | Performed by: INTERNAL MEDICINE

## 2024-08-28 PROCEDURE — 3078F DIAST BP <80 MM HG: CPT | Performed by: INTERNAL MEDICINE

## 2024-08-28 PROCEDURE — 99214 OFFICE O/P EST MOD 30 MIN: CPT | Performed by: INTERNAL MEDICINE

## 2024-08-28 NOTE — PROGRESS NOTES
Nurse note from patient's weekly LCD / Maintenance class was reviewed.  Pertinent medical concerns were:   reviewed     BP Readings from Last 3 Encounters:   08/23/24 130/88   08/22/24 120/70   08/16/24 123/86       Failed to redirect to the Timeline version of the REVFS SmartLink.    Current Outpatient Medications   Medication Sig Dispense Refill    Semaglutide-Weight Management (WEGOVY) 0.25 MG/0.5ML SOAJ SC injection Inject 0.25 mg into the skin every 7 days 2 mL 0    furosemide (LASIX) 40 MG tablet Take 1 tablet by mouth daily 90 tablet 1    apixaban (ELIQUIS) 5 MG TABS tablet Take 1 tablet by mouth 2 times daily 180 tablet 1    acetaminophen (TYLENOL) 500 MG tablet Take by mouth every 6 hours as needed      sacubitril-valsartan (ENTRESTO) 24-26 MG per tablet Take 1 tablet by mouth 2 times daily 60 tablet 3    potassium chloride (KLOR-CON M10) 10 MEQ extended release tablet TAKE 1 TABLET BY MOUTH EVERY DAY 90 tablet 3     No current facility-administered medications for this visit.

## 2024-08-28 NOTE — PROGRESS NOTES
Room #: EP 2    Chief Complaint   Patient presents with    Atrial Fibrillation    Congestive Heart Failure     /72 (Site: Left Upper Arm, Position: Sitting, Cuff Size: Large Adult)   Pulse 76   Resp 20   Ht 1.905 m (6' 3\")   Wt (!) 146 kg (321 lb 12.8 oz)   SpO2 97%   BMI 40.22 kg/m²         Chest pain: NO       1. Have you been to the ER, urgent care clinic outside of [facility] since your last visit?  Hospitalized since your last visit?  NO        Refills:  NO

## 2024-08-29 ENCOUNTER — OFFICE VISIT (OUTPATIENT)
Age: 64
End: 2024-08-29
Payer: COMMERCIAL

## 2024-08-29 VITALS
TEMPERATURE: 98.1 F | RESPIRATION RATE: 22 BRPM | BODY MASS INDEX: 39.17 KG/M2 | SYSTOLIC BLOOD PRESSURE: 126 MMHG | OXYGEN SATURATION: 95 % | HEIGHT: 75 IN | HEART RATE: 66 BPM | WEIGHT: 315 LBS | DIASTOLIC BLOOD PRESSURE: 76 MMHG

## 2024-08-29 DIAGNOSIS — I10 PRIMARY HYPERTENSION: ICD-10-CM

## 2024-08-29 DIAGNOSIS — I48.19 PERSISTENT ATRIAL FIBRILLATION WITH RAPID VENTRICULAR RESPONSE (HCC): ICD-10-CM

## 2024-08-29 DIAGNOSIS — D68.69 SECONDARY HYPERCOAGULABLE STATE (HCC): ICD-10-CM

## 2024-08-29 DIAGNOSIS — E66.01 CLASS 3 SEVERE OBESITY DUE TO EXCESS CALORIES WITH SERIOUS COMORBIDITY AND BODY MASS INDEX (BMI) OF 40.0 TO 44.9 IN ADULT (HCC): Primary | ICD-10-CM

## 2024-08-29 PROCEDURE — 3074F SYST BP LT 130 MM HG: CPT | Performed by: FAMILY MEDICINE

## 2024-08-29 PROCEDURE — 3078F DIAST BP <80 MM HG: CPT | Performed by: FAMILY MEDICINE

## 2024-08-29 PROCEDURE — 99214 OFFICE O/P EST MOD 30 MIN: CPT | Performed by: FAMILY MEDICINE

## 2024-08-29 RX ORDER — SEMAGLUTIDE 0.5 MG/.5ML
0.5 INJECTION, SOLUTION SUBCUTANEOUS
Qty: 2 ML | Refills: 0 | Status: SHIPPED | OUTPATIENT
Start: 2024-08-29

## 2024-08-29 ASSESSMENT — PATIENT HEALTH QUESTIONNAIRE - PHQ9
SUM OF ALL RESPONSES TO PHQ QUESTIONS 1-9: 0
2. FEELING DOWN, DEPRESSED OR HOPELESS: NOT AT ALL
SUM OF ALL RESPONSES TO PHQ9 QUESTIONS 1 & 2: 0
1. LITTLE INTEREST OR PLEASURE IN DOING THINGS: NOT AT ALL
SUM OF ALL RESPONSES TO PHQ QUESTIONS 1-9: 0

## 2024-08-29 NOTE — PROGRESS NOTES
[facility] Progress Note:   F/up Physician Visit    CC: Weight Management      Osito Ruff is a 64 y.o. male who is here for his  f/up physician visit for the VLCD / LCD Program.  July 318  Now 315  Eating less iccream and no preetzels now  He was able to get wegovy and took all 4 doses    He tolerated it fine          8/29/2024     1:17 PM 8/29/2024     1:00 PM 8/28/2024     2:34 PM 8/23/2024    12:12 PM 8/22/2024     9:43 AM 8/16/2024    11:07 AM 8/16/2024    10:08 AM   Weight Metrics   Weight 315 lb 11.2 oz  321 lb 12.8 oz 320 lb 321 lb 1.6 oz 321 lb 3.2 oz 320 lb   Neck (Inches)  20.5 in        Waist Measure Inches  57 in        BMI (Calculated) 39.5 kg/m2  40.3 kg/m2 40.1 kg/m2 40.2 kg/m2 40.2 kg/m2 40.1 kg/m2          No data to display                   Current Outpatient Medications   Medication Sig Dispense Refill    Semaglutide-Weight Management (WEGOVY) 0.5 MG/0.5ML SOAJ SC injection Inject 0.5 mg into the skin every 7 days 2 mL 0    sacubitril-valsartan (ENTRESTO) 24-26 MG per tablet Take 1 tablet by mouth 2 times daily 60 tablet 3    potassium chloride (KLOR-CON M10) 10 MEQ extended release tablet TAKE 1 TABLET BY MOUTH EVERY DAY 90 tablet 3    furosemide (LASIX) 40 MG tablet Take 1 tablet by mouth daily 90 tablet 1    apixaban (ELIQUIS) 5 MG TABS tablet Take 1 tablet by mouth 2 times daily 180 tablet 1    acetaminophen (TYLENOL) 500 MG tablet Take by mouth every 6 hours as needed       No current facility-administered medications for this visit.          Participation   Did you attend clinic and class last week? no    Review of Systems  Since your last visit, have you experienced any complications? no  If yes, please list:       Are you taking an appetite suppressant? yes  If so, is there any Chest Pain, Palpitations or Dizziness?      HUNGER CONTROL: good    BP Readings from Last 3 Encounters:   08/29/24 126/76   08/28/24 110/72   08/23/24 130/88       SLEEP:7-9    Have you

## 2024-08-29 NOTE — PROGRESS NOTES
Identified pt with two pt identifiers (name and ). Reviewed chart in preparation for visit and have obtained necessary documentation.    Osito Ruff is a 64 y.o. male  Chief Complaint   Patient presents with    Weight Management     1 month follow     /76 (Site: Right Upper Arm, Position: Sitting, Cuff Size: Large Adult)   Pulse 66   Temp 98.1 °F (36.7 °C) (Oral)   Resp 22   Ht 1.905 m (6' 3\")   Wt (!) 143.2 kg (315 lb 11.2 oz)   SpO2 95%   BMI 39.46 kg/m²     1. Have you been to the ER, urgent care clinic since your last visit?  Hospitalized since your last visit?no    2. Have you seen or consulted any other health care providers outside of the [facility] since your last visit?  Include any pap smears or colon screening. No    BMI -     (Unable to remove compression stockings today)

## 2024-08-29 NOTE — PROGRESS NOTES
[facility]   Cardiac Electrophysiology Clinic Care Note                   [ ]Initial visit     [x]Established visit     Patient Name: Osito Ruff - :1960 - MRN:[id number]   Primary Cardiologist: Ed Abdul MD   Electrophysiologist: Arben Olmos MD     Reason for visit: Pacemaker follow up     HPI:   Mr. Ruff is a 64 y.o.male who presents for follow up, is s/p Medtronic dual chamber pacemaker (gen change 2023, leads 2012).   He reports chronic stable HARDIN   Ventricular lead 5076-58, [id number], DOI 12     Intermittent lightheadedness, mainly orthostatic but occasionally at other times as well.  States hip bursitis is his main limiting factor for activity at this point.   He is not able to stand at work  He is applying for disability   He sees Ortho Dr Hall    His brother came from [place] with this visit  Most recent echo this month showed LVEF down to 46%  He has AFIB and chronic RV pacing  Dr Abdul started him on entresto this past week he just began    Echo in 2023 showed LVEF 50-55% with mild concentric LVH, moderately dilated LA, & moderately elevated RVSP.     Nuclear stress test in 2023 showed small apical infarct, but no convincing ischemia.     CTA 2024  1. Left main originate normally from left coronary cusp and is normal size  without any significant atherosclerotic plaque or calcification.     2. The LAD is normal size vessel with mild calcified plaque in the ostial  segment and mid segment without any significant luminal stenosis. The D1 and D2  diagonal branches are seen without any significant disease or calcification.  There is no myocardial bridging seen.      3. The ramus intermedius is a large vessel without any significant disease or  calcification.     4. The left circumflex is small caliber vessel without any significant  calcification or disease. The OM1 branch is without any significant disease or  calcification.     5. The RCA is normal size  vessel and originate normally from the right coronary  cusp. There is minimal calcified plaque in the mid segment without any  significant luminal stenosis. No significant disease in the remainder of the  RCA, PDA or PLC branches.     6. The aortic valve demonstrate mild calcification. The mitral annulus does not  demonstrate calcification.     6. Visible portions of the ascending aorta demonstrate mild calcified plaque.  Visible portions of descending thoracic aorta demonstrate mild calcified plaque.        7. The ascending aorta measures 38 mm.      8. There is subendocardial calcification of the os of the left upper and lower  pulmonary vein, posterior wall of the left atrium and septal wall of the left  atrium.     9. The extracardiac portion of this test will be read separately by staff  radiologist.      Total coronary calcium score: 83     Calcium score interpretation:   0-0 = No evidence of CAD   1-10 = Minimal evidence of CAD    = Mild evidence of CAD   101-400 = Moderate evidence of CAD   >400 = Extensive evidence of CAD         The coronary calcium in each vessel is as follows:   Left main coronary artery: 0  Left anterior descending coronary artery: 82  Left circumflex coronary artery: 0   Right coronary artery: 1  Posterior descending coronary artery: 0     Anticoagulated with Eliquis, denies bleeding issues.     States he's making efforts at weight loss currently, is seen by weight management & has changed his diet.        Previous:   Medtronic dual chamber pacemaker (gen change 01/04/2023, leads 08/17/2012).     NSVT noted 03/2021.     S/p AV node ablation 07/16/2018.       Recurrent AF with RVR despite AF ablation 01/10/2017.        Admitted with recurrent AF in 2016, was cardioverted.          Assessment and Plan      Diagnosis Orders   1. Pacemaker        2. Chronic systolic (congestive) heart failure        3. Secondary hypercoagulable state (HCC)        4. Primary hypertension        5.

## 2024-08-30 DIAGNOSIS — I48.19 PERSISTENT ATRIAL FIBRILLATION WITH RAPID VENTRICULAR RESPONSE (HCC): ICD-10-CM

## 2024-08-30 LAB
ANION GAP SERPL CALC-SCNC: 6 MMOL/L (ref 5–15)
BUN SERPL-MCNC: 18 MG/DL (ref 6–20)
BUN/CREAT SERPL: 24 (ref 12–20)
CALCIUM SERPL-MCNC: 9 MG/DL (ref 8.5–10.1)
CHLORIDE SERPL-SCNC: 106 MMOL/L (ref 97–108)
CO2 SERPL-SCNC: 28 MMOL/L (ref 21–32)
CREAT SERPL-MCNC: 0.74 MG/DL (ref 0.7–1.3)
GLUCOSE SERPL-MCNC: 109 MG/DL (ref 65–100)
POTASSIUM SERPL-SCNC: 4.6 MMOL/L (ref 3.5–5.1)
SODIUM SERPL-SCNC: 140 MMOL/L (ref 136–145)

## 2024-09-09 ENCOUNTER — TELEPHONE (OUTPATIENT)
Age: 64
End: 2024-09-09

## 2024-09-10 ENCOUNTER — TELEPHONE (OUTPATIENT)
Age: 64
End: 2024-09-10

## 2024-09-11 ENCOUNTER — NURSE ONLY (OUTPATIENT)
Age: 64
End: 2024-09-11

## 2024-09-11 ENCOUNTER — TELEPHONE (OUTPATIENT)
Age: 64
End: 2024-09-11

## 2024-09-11 ENCOUNTER — OFFICE VISIT (OUTPATIENT)
Age: 64
End: 2024-09-11

## 2024-09-11 ENCOUNTER — PREP FOR PROCEDURE (OUTPATIENT)
Age: 64
End: 2024-09-11

## 2024-09-11 VITALS
BODY MASS INDEX: 39.17 KG/M2 | DIASTOLIC BLOOD PRESSURE: 80 MMHG | TEMPERATURE: 98.2 F | HEIGHT: 75 IN | WEIGHT: 315 LBS | RESPIRATION RATE: 22 BRPM | SYSTOLIC BLOOD PRESSURE: 127 MMHG | HEART RATE: 77 BPM | OXYGEN SATURATION: 96 %

## 2024-09-11 VITALS
WEIGHT: 315 LBS | BODY MASS INDEX: 39.17 KG/M2 | OXYGEN SATURATION: 96 % | SYSTOLIC BLOOD PRESSURE: 118 MMHG | HEART RATE: 86 BPM | HEIGHT: 75 IN | DIASTOLIC BLOOD PRESSURE: 74 MMHG

## 2024-09-11 DIAGNOSIS — I97.190 COMPLETE AV BLOCK DUE TO AV NODAL ABLATION (HCC): ICD-10-CM

## 2024-09-11 DIAGNOSIS — Z01.812 PRE-PROCEDURE LAB EXAM: ICD-10-CM

## 2024-09-11 DIAGNOSIS — R06.02 SOB (SHORTNESS OF BREATH): Primary | ICD-10-CM

## 2024-09-11 DIAGNOSIS — E66.813 CLASS 3 SEVERE OBESITY DUE TO EXCESS CALORIES WITH SERIOUS COMORBIDITY AND BODY MASS INDEX (BMI) OF 40.0 TO 44.9 IN ADULT: Primary | ICD-10-CM

## 2024-09-11 DIAGNOSIS — D68.69 SECONDARY HYPERCOAGULABLE STATE (HCC): ICD-10-CM

## 2024-09-11 DIAGNOSIS — R06.02 SOB (SHORTNESS OF BREATH): ICD-10-CM

## 2024-09-11 DIAGNOSIS — I27.20 PULMONARY HTN (HCC): ICD-10-CM

## 2024-09-11 DIAGNOSIS — E66.01 CLASS 3 SEVERE OBESITY DUE TO EXCESS CALORIES WITH SERIOUS COMORBIDITY AND BODY MASS INDEX (BMI) OF 40.0 TO 44.9 IN ADULT: Primary | ICD-10-CM

## 2024-09-11 DIAGNOSIS — I48.19 PERSISTENT ATRIAL FIBRILLATION WITH RAPID VENTRICULAR RESPONSE (HCC): ICD-10-CM

## 2024-09-11 DIAGNOSIS — Z95.0 PACEMAKER: Primary | ICD-10-CM

## 2024-09-11 DIAGNOSIS — I10 PRIMARY HYPERTENSION: ICD-10-CM

## 2024-09-11 DIAGNOSIS — I44.2 COMPLETE AV BLOCK DUE TO AV NODAL ABLATION (HCC): ICD-10-CM

## 2024-09-11 DIAGNOSIS — R06.02 SHORTNESS OF BREATH: ICD-10-CM

## 2024-09-11 DIAGNOSIS — I27.0 PRIMARY PULMONARY HYPERTENSION (HCC): ICD-10-CM

## 2024-09-11 DIAGNOSIS — I50.22 CHRONIC SYSTOLIC (CONGESTIVE) HEART FAILURE (HCC): ICD-10-CM

## 2024-09-11 RX ORDER — SODIUM CHLORIDE 9 MG/ML
INJECTION, SOLUTION INTRAVENOUS CONTINUOUS
Status: CANCELLED | OUTPATIENT
Start: 2024-09-11 | End: 2024-09-11

## 2024-09-11 RX ORDER — SODIUM CHLORIDE 0.9 % (FLUSH) 0.9 %
5-40 SYRINGE (ML) INJECTION PRN
Status: CANCELLED | OUTPATIENT
Start: 2024-09-11

## 2024-09-11 ASSESSMENT — PATIENT HEALTH QUESTIONNAIRE - PHQ9
SUM OF ALL RESPONSES TO PHQ QUESTIONS 1-9: 0
1. LITTLE INTEREST OR PLEASURE IN DOING THINGS: NOT AT ALL
SUM OF ALL RESPONSES TO PHQ QUESTIONS 1-9: 0
SUM OF ALL RESPONSES TO PHQ9 QUESTIONS 1 & 2: 0
SUM OF ALL RESPONSES TO PHQ QUESTIONS 1-9: 0
2. FEELING DOWN, DEPRESSED OR HOPELESS: NOT AT ALL
SUM OF ALL RESPONSES TO PHQ QUESTIONS 1-9: 0

## 2024-09-11 NOTE — PROGRESS NOTES
Identified pt with two pt identifiers (name and ). Reviewed chart in preparation for visit and have obtained necessary documentation.    Osito Ruff is a 64 y.o. male  Chief Complaint   Patient presents with    Weight Management     /80 (Site: Right Upper Arm, Position: Sitting, Cuff Size: Large Adult)   Pulse 77   Temp 98.2 °F (36.8 °C) (Oral)   Resp 22   Ht 1.905 m (6' 3\")   Wt (!) 146.7 kg (323 lb 8 oz)   SpO2 96%   BMI 40.43 kg/m²     1. Have you been to the ER, urgent care clinic since your last visit?  Hospitalized since your last visit?no    2. Have you seen or consulted any other health care providers outside of the [facility] since your last visit?  Include any pap smears or colon screening. No    Patient attended triage but did not bring homework form. Patient instructed to email or fax completed homework form to us. Patient informed that not bringing the homework form can result in not being seen next time.

## 2024-09-12 LAB
ERYTHROCYTE [DISTWIDTH] IN BLOOD BY AUTOMATED COUNT: 11.9 % (ref 11.5–14.5)
HCT VFR BLD AUTO: 47.8 % (ref 36.6–50.3)
HGB BLD-MCNC: 15.4 G/DL (ref 12.1–17)
MCH RBC QN AUTO: 32.2 PG (ref 26–34)
MCHC RBC AUTO-ENTMCNC: 32.2 G/DL (ref 30–36.5)
MCV RBC AUTO: 99.8 FL (ref 80–99)
NRBC # BLD: 0 K/UL (ref 0–0.01)
NRBC BLD-RTO: 0 PER 100 WBC
PLATELET # BLD AUTO: 212 K/UL (ref 150–400)
PMV BLD AUTO: 9.9 FL (ref 8.9–12.9)
RBC # BLD AUTO: 4.79 M/UL (ref 4.1–5.7)
WBC # BLD AUTO: 8.1 K/UL (ref 4.1–11.1)

## 2024-09-14 ENCOUNTER — TELEPHONE (OUTPATIENT)
Age: 64
End: 2024-09-14

## 2024-09-19 ENCOUNTER — TELEPHONE (OUTPATIENT)
Age: 64
End: 2024-09-19

## 2024-09-19 NOTE — TELEPHONE ENCOUNTER
Cardiac catheterization denied. Peer to peer scheduled with Dr. Butterfield at 4:15 pm Eastern time.

## 2024-09-20 ENCOUNTER — PATIENT MESSAGE (OUTPATIENT)
Age: 64
End: 2024-09-20

## 2024-09-23 ENCOUNTER — TELEPHONE (OUTPATIENT)
Age: 64
End: 2024-09-23

## 2024-09-23 ENCOUNTER — OFFICE VISIT (OUTPATIENT)
Age: 64
End: 2024-09-23
Payer: COMMERCIAL

## 2024-09-23 VITALS
HEART RATE: 75 BPM | OXYGEN SATURATION: 97 % | WEIGHT: 315 LBS | SYSTOLIC BLOOD PRESSURE: 118 MMHG | DIASTOLIC BLOOD PRESSURE: 64 MMHG | TEMPERATURE: 98 F | HEIGHT: 75 IN | RESPIRATION RATE: 16 BRPM | BODY MASS INDEX: 39.17 KG/M2

## 2024-09-23 DIAGNOSIS — E66.01 MORBID OBESITY: ICD-10-CM

## 2024-09-23 DIAGNOSIS — M21.6X2 ACQUIRED ADDUCTION DEFORMITY OF LEFT FOOT: ICD-10-CM

## 2024-09-23 DIAGNOSIS — I48.19 PERSISTENT ATRIAL FIBRILLATION (HCC): ICD-10-CM

## 2024-09-23 DIAGNOSIS — R06.09 DOE (DYSPNEA ON EXERTION): ICD-10-CM

## 2024-09-23 DIAGNOSIS — M79.673 FOOT ARCH PAIN, UNSPECIFIED LATERALITY: ICD-10-CM

## 2024-09-23 DIAGNOSIS — E11.65 CONTROLLED TYPE 2 DIABETES MELLITUS WITH HYPERGLYCEMIA, WITHOUT LONG-TERM CURRENT USE OF INSULIN (HCC): ICD-10-CM

## 2024-09-23 DIAGNOSIS — I50.22 CHRONIC SYSTOLIC (CONGESTIVE) HEART FAILURE (HCC): ICD-10-CM

## 2024-09-23 DIAGNOSIS — Z86.16 HISTORY OF COVID-19: Primary | ICD-10-CM

## 2024-09-23 PROCEDURE — 3078F DIAST BP <80 MM HG: CPT | Performed by: INTERNAL MEDICINE

## 2024-09-23 PROCEDURE — 3051F HG A1C>EQUAL 7.0%<8.0%: CPT | Performed by: INTERNAL MEDICINE

## 2024-09-23 PROCEDURE — 99214 OFFICE O/P EST MOD 30 MIN: CPT | Performed by: INTERNAL MEDICINE

## 2024-09-23 PROCEDURE — 3074F SYST BP LT 130 MM HG: CPT | Performed by: INTERNAL MEDICINE

## 2024-09-23 ASSESSMENT — ENCOUNTER SYMPTOMS: SHORTNESS OF BREATH: 1

## 2024-09-24 ENCOUNTER — HOSPITAL ENCOUNTER (OUTPATIENT)
Facility: HOSPITAL | Age: 64
Discharge: HOME | End: 2024-09-24
Attending: INTERNAL MEDICINE | Admitting: INTERNAL MEDICINE
Payer: COMMERCIAL

## 2024-09-24 VITALS
TEMPERATURE: 98.4 F | SYSTOLIC BLOOD PRESSURE: 108 MMHG | BODY MASS INDEX: 39.17 KG/M2 | HEART RATE: 61 BPM | RESPIRATION RATE: 18 BRPM | HEIGHT: 75 IN | OXYGEN SATURATION: 96 % | WEIGHT: 315 LBS | DIASTOLIC BLOOD PRESSURE: 65 MMHG

## 2024-09-24 DIAGNOSIS — R06.02 SOB (SHORTNESS OF BREATH): ICD-10-CM

## 2024-09-24 DIAGNOSIS — I27.20 PULMONARY HTN (HCC): ICD-10-CM

## 2024-09-24 DIAGNOSIS — I27.0 PRIMARY PULMONARY HYPERTENSION (HCC): ICD-10-CM

## 2024-09-24 DIAGNOSIS — R06.02 SHORTNESS OF BREATH: ICD-10-CM

## 2024-09-24 LAB — ECHO BSA: 2.78 M2

## 2024-09-24 PROCEDURE — 99153 MOD SED SAME PHYS/QHP EA: CPT | Performed by: INTERNAL MEDICINE

## 2024-09-24 PROCEDURE — 76937 US GUIDE VASCULAR ACCESS: CPT | Performed by: INTERNAL MEDICINE

## 2024-09-24 PROCEDURE — C1894 INTRO/SHEATH, NON-LASER: HCPCS | Performed by: INTERNAL MEDICINE

## 2024-09-24 PROCEDURE — C1713 ANCHOR/SCREW BN/BN,TIS/BN: HCPCS | Performed by: INTERNAL MEDICINE

## 2024-09-24 PROCEDURE — 93460 R&L HRT ART/VENTRICLE ANGIO: CPT | Performed by: INTERNAL MEDICINE

## 2024-09-24 PROCEDURE — 2500000003 HC RX 250 WO HCPCS: Performed by: INTERNAL MEDICINE

## 2024-09-24 PROCEDURE — C1725 CATH, TRANSLUMIN NON-LASER: HCPCS | Performed by: INTERNAL MEDICINE

## 2024-09-24 PROCEDURE — 6360000002 HC RX W HCPCS: Performed by: INTERNAL MEDICINE

## 2024-09-24 PROCEDURE — 6360000004 HC RX CONTRAST MEDICATION: Performed by: INTERNAL MEDICINE

## 2024-09-24 PROCEDURE — 99152 MOD SED SAME PHYS/QHP 5/>YRS: CPT | Performed by: INTERNAL MEDICINE

## 2024-09-24 PROCEDURE — 2709999900 HC NON-CHARGEABLE SUPPLY: Performed by: INTERNAL MEDICINE

## 2024-09-24 RX ORDER — IOPAMIDOL 755 MG/ML
INJECTION, SOLUTION INTRAVASCULAR PRN
Status: DISCONTINUED | OUTPATIENT
Start: 2024-09-24 | End: 2024-09-24 | Stop reason: HOSPADM

## 2024-09-24 RX ORDER — LIDOCAINE HYDROCHLORIDE 10 MG/ML
INJECTION, SOLUTION INFILTRATION; PERINEURAL PRN
Status: DISCONTINUED | OUTPATIENT
Start: 2024-09-24 | End: 2024-09-24 | Stop reason: HOSPADM

## 2024-09-24 RX ORDER — SODIUM CHLORIDE 9 MG/ML
INJECTION, SOLUTION INTRAVENOUS PRN
Status: DISCONTINUED | OUTPATIENT
Start: 2024-09-24 | End: 2024-09-24 | Stop reason: HOSPADM

## 2024-09-24 RX ORDER — HEPARIN SODIUM 1000 [USP'U]/ML
INJECTION, SOLUTION INTRAVENOUS; SUBCUTANEOUS PRN
Status: DISCONTINUED | OUTPATIENT
Start: 2024-09-24 | End: 2024-09-24 | Stop reason: HOSPADM

## 2024-09-24 RX ORDER — FENTANYL CITRATE 50 UG/ML
INJECTION, SOLUTION INTRAMUSCULAR; INTRAVENOUS PRN
Status: DISCONTINUED | OUTPATIENT
Start: 2024-09-24 | End: 2024-09-24 | Stop reason: HOSPADM

## 2024-09-24 RX ORDER — SODIUM CHLORIDE 9 MG/ML
INJECTION, SOLUTION INTRAVENOUS CONTINUOUS
Status: DISCONTINUED | OUTPATIENT
Start: 2024-09-24 | End: 2024-09-24 | Stop reason: HOSPADM

## 2024-09-24 RX ORDER — MIDAZOLAM HYDROCHLORIDE 1 MG/ML
INJECTION INTRAMUSCULAR; INTRAVENOUS PRN
Status: DISCONTINUED | OUTPATIENT
Start: 2024-09-24 | End: 2024-09-24 | Stop reason: HOSPADM

## 2024-09-24 RX ORDER — SODIUM CHLORIDE 0.9 % (FLUSH) 0.9 %
5-40 SYRINGE (ML) INJECTION EVERY 12 HOURS SCHEDULED
Status: DISCONTINUED | OUTPATIENT
Start: 2024-09-24 | End: 2024-09-24 | Stop reason: HOSPADM

## 2024-09-24 RX ORDER — SODIUM CHLORIDE 9 MG/ML
INJECTION, SOLUTION INTRAVENOUS CONTINUOUS
Status: DISPENSED | OUTPATIENT
Start: 2024-09-24 | End: 2024-09-24

## 2024-09-24 RX ORDER — SODIUM CHLORIDE 0.9 % (FLUSH) 0.9 %
5-40 SYRINGE (ML) INJECTION PRN
Status: DISCONTINUED | OUTPATIENT
Start: 2024-09-24 | End: 2024-09-24 | Stop reason: HOSPADM

## 2024-09-24 RX ORDER — ACETAMINOPHEN 325 MG/1
650 TABLET ORAL EVERY 4 HOURS PRN
Status: DISCONTINUED | OUTPATIENT
Start: 2024-09-24 | End: 2024-09-24 | Stop reason: HOSPADM

## 2024-09-24 RX ORDER — HEPARIN SODIUM 200 [USP'U]/100ML
INJECTION, SOLUTION INTRAVENOUS CONTINUOUS PRN
Status: COMPLETED | OUTPATIENT
Start: 2024-09-24 | End: 2024-09-24

## 2024-09-25 ENCOUNTER — TELEPHONE (OUTPATIENT)
Age: 64
End: 2024-09-25

## 2024-09-25 DIAGNOSIS — I44.2 COMPLETE AV BLOCK DUE TO AV NODAL ABLATION (HCC): ICD-10-CM

## 2024-09-25 DIAGNOSIS — I50.22 CHRONIC SYSTOLIC (CONGESTIVE) HEART FAILURE (HCC): Primary | ICD-10-CM

## 2024-09-25 DIAGNOSIS — Z95.0 PACEMAKER: ICD-10-CM

## 2024-09-25 DIAGNOSIS — Z95.0 CARDIAC PACEMAKER IN SITU: ICD-10-CM

## 2024-09-25 DIAGNOSIS — I97.190 COMPLETE AV BLOCK DUE TO AV NODAL ABLATION (HCC): ICD-10-CM

## 2024-09-25 DIAGNOSIS — R06.02 SHORTNESS OF BREATH: ICD-10-CM

## 2024-09-25 DIAGNOSIS — I50.9 CHF (CONGESTIVE HEART FAILURE) (HCC): ICD-10-CM

## 2024-09-27 PROBLEM — Z95.0 CARDIAC PACEMAKER IN SITU: Status: ACTIVE | Noted: 2024-09-25

## 2024-09-27 PROBLEM — I50.9 CHF (CONGESTIVE HEART FAILURE) (HCC): Status: ACTIVE | Noted: 2024-09-25

## 2024-09-30 NOTE — PROGRESS NOTES
Nurse note from patient's weekly  LCD / Maintenance class was reviewed.  Pertinent medical concerns were:   reviewed     BP Readings from Last 3 Encounters:   09/24/24 108/65   09/23/24 118/64   09/11/24 118/74       [facility] to redirect [facility] the [facility].    Current Outpatient Medications   Medication Sig Dispense Refill    Semaglutide-Weight Management (WEGOVY) 0.5 MG/0.5ML SOAJ SC injection Inject 0.5 mg into the skin every 7 days 2 mL 0    sacubitril-valsartan (ENTRESTO) 24-26 MG per tablet Take 1 tablet by mouth 2 times daily 60 tablet 3    potassium chloride (KLOR-CON M10) 10 MEQ extended release tablet TAKE 1 TABLET BY MOUTH EVERY DAY 90 tablet 3    furosemide (LASIX) 40 MG tablet Take 1 tablet by mouth daily 90 tablet 1    apixaban (ELIQUIS) 5 MG TABS tablet Take 1 tablet by mouth 2 times daily 180 tablet 1    acetaminophen (TYLENOL) 500 MG tablet Take by mouth every 6 hours as needed       No current facility-administered medications for this visit.

## 2024-10-02 ENCOUNTER — OFFICE VISIT (OUTPATIENT)
Age: 64
End: 2024-10-02
Payer: COMMERCIAL

## 2024-10-02 VITALS
DIASTOLIC BLOOD PRESSURE: 70 MMHG | HEIGHT: 75 IN | WEIGHT: 315 LBS | HEART RATE: 85 BPM | BODY MASS INDEX: 39.17 KG/M2 | SYSTOLIC BLOOD PRESSURE: 130 MMHG | OXYGEN SATURATION: 97 %

## 2024-10-02 DIAGNOSIS — I48.19 PERSISTENT ATRIAL FIBRILLATION WITH RAPID VENTRICULAR RESPONSE (HCC): ICD-10-CM

## 2024-10-02 DIAGNOSIS — I97.190 COMPLETE AV BLOCK DUE TO AV NODAL ABLATION (HCC): ICD-10-CM

## 2024-10-02 DIAGNOSIS — I50.9 CONGESTIVE HEART FAILURE, UNSPECIFIED HF CHRONICITY, UNSPECIFIED HEART FAILURE TYPE (HCC): Primary | ICD-10-CM

## 2024-10-02 DIAGNOSIS — I44.2 COMPLETE AV BLOCK DUE TO AV NODAL ABLATION (HCC): ICD-10-CM

## 2024-10-02 DIAGNOSIS — I27.0 PRIMARY PULMONARY HYPERTENSION (HCC): ICD-10-CM

## 2024-10-02 DIAGNOSIS — R06.02 SHORTNESS OF BREATH: ICD-10-CM

## 2024-10-02 DIAGNOSIS — Z95.0 CARDIAC PACEMAKER IN SITU: ICD-10-CM

## 2024-10-02 PROCEDURE — 99215 OFFICE O/P EST HI 40 MIN: CPT | Performed by: SPECIALIST

## 2024-10-02 PROCEDURE — 93000 ELECTROCARDIOGRAM COMPLETE: CPT | Performed by: SPECIALIST

## 2024-10-02 PROCEDURE — 3078F DIAST BP <80 MM HG: CPT | Performed by: SPECIALIST

## 2024-10-02 PROCEDURE — 3075F SYST BP GE 130 - 139MM HG: CPT | Performed by: SPECIALIST

## 2024-10-02 NOTE — PATIENT INSTRUCTIONS
Start taking jardiance 10 mg daily.    Have labs obtained in 3 weeks.    Record blood pressure/heart rate daily ( one week prior to follow up) .  Remember to sit for at least 3 minutes.  Have both feet flat on floor and arm at heart level.    Schedule follow up with Dr. Abdul in 4-5 weeks.

## 2024-10-02 NOTE — ADDENDUM NOTE
Addended by: THEODORE BLAKE on: 10/2/2024 01:47 PM     Modules accepted: Orders     Chief Complaint   Patient presents with   • Ear Pain     Rt ear pain, runny nose, cough, started today       HISTORY OF PRESENT ILLNESS:   Patient is a 9 year old , male who presents in no acute distress for concerns of right ear pain.  Reports has been having a cough, runny nose for the last 3 or 4 days.  States came home from school crying of right ear pain.  No recent antibiotics.  No history of multiple ear infections.  No fevers.  No drainage from the ear.  No injury.    I have reviewed the patient's medications and allergies, past medical, surgical, social and family history, updating these as appropriate.  See histories section of the EMR (electronic medical record) for a display of this information.    ROS: 5 point review of systems negative unless otherwise noted in HPI.    PHYSICAL EXAM:   CONSTITUTIONAL:  Well-developed, well-nourished patient who is awake, alert and appears uncomfortable  HEAD NECK/FACE:  Normocephalic, atraumatic  EYES:  Pupils equal round and reactive to light, extraocular movements intact. Lids and lashes normal. Conjunctiva and sclera are not icteric and not injected. Periorbital areas with no swelling, redness or edema.  ENT:  Nares congested.  Clear nasal discharge, no septal abnormalities noted. Tympanic membranes show tenting bilaterally however right tympanic membrane is acutely red, bulging with loss of bony landmarks External auditory canals are clear bilaterally. Oropharynx with no redness, swelling, masses, exudates or evidence of obstruction. Uvula midline. Mucous membranes pink and moist.  NECK:  Trachea midline with no masses palpated and no evidence of cervical lymphadenopathy. Supple with full range of motion. No nuchal rigidity or vertebral tenderness noted. No meningismus  CHEST:  Normal symmetrical motion. No tenderness noted. No crepitus.  CARDIOVASCULAR:  Regular rate and rhythm with normal S1 and S2. No murmurs or rubs noted. No JVD. No pulse  deficits.  RESPIRATORY:  Lungs have equal breath sounds bilaterally and are clear to auscultation. No rales, rhonchi or wheezing noted. No increased work of breathing. No retractions or nasal flaring.  SKIN:  Visible skin without rashes, pallor or cyanosis    ASSESSMENT:  1. Non-recurrent acute suppurative otitis media of right ear without spontaneous rupture of tympanic membrane    2. Upper respiratory tract infection, unspecified type        MDM; Multiple diagnoses considered on this patient.  All vitals and testing reviewed.  Clinical exam and history is consistent with a right otitis media.  For this, I will place on amoxicillin to treat.  We do discuss return precautions.  We do discuss typical disease course.  I do stressed the importance of analgesia with patient's family member who is here as he has had nothing for ear pain.  We will give ibuprofen here in the clinic.      PLAN:    Orders Placed This Encounter   • amoxicillin (AMOXIL) 400 MG/5ML suspension   • ibuprofen (CHILDRENS MOTRIN,ADVIL) 100 MG/5ML suspension 494 mg       No follow-ups on file.  If you are able, please use Tylenol and/or ibuprofen as needed for fevers and/or pain per package instructions for the next 3-5 days.    Take the full course of antibiotics as directed on the package.  All antibiotics may cause GI upset and/or diarrhea.  You may take this with food.    Antibiotics typically will not really pain for up to 24-48 hours.    You should show marked improvement over the next 48-72 hours.  If symptoms persist beyond this please follow-up with primary care provider.    Return to the urgent care for any concerns

## 2024-10-02 NOTE — PROGRESS NOTES
inadvertently transcribed by the computer software.  Please disregard these errors.  Please excuse any errors that have escaped final proofreading.

## 2024-10-03 ENCOUNTER — TELEPHONE (OUTPATIENT)
Age: 64
End: 2024-10-03

## 2024-10-03 ENCOUNTER — PATIENT MESSAGE (OUTPATIENT)
Age: 64
End: 2024-10-03

## 2024-10-03 NOTE — TELEPHONE ENCOUNTER
Completed prior authorization for jardiiance via CoverMyMeds.  Key [id number].  Received approval, effective 10-3-24 to 10-3-25.      Identifiers x 2.  Informed of the above. He will attempt to activate co-pay card and see if medication is affordable.  To keep office informed.

## 2024-10-04 ENCOUNTER — TELEMEDICINE (OUTPATIENT)
Age: 64
End: 2024-10-04
Payer: COMMERCIAL

## 2024-10-04 VITALS
SYSTOLIC BLOOD PRESSURE: 114 MMHG | TEMPERATURE: 97.5 F | HEART RATE: 66 BPM | DIASTOLIC BLOOD PRESSURE: 64 MMHG | BODY MASS INDEX: 40.43 KG/M2 | HEIGHT: 74 IN | WEIGHT: 315 LBS

## 2024-10-04 DIAGNOSIS — E66.01 CLASS 3 SEVERE OBESITY DUE TO EXCESS CALORIES WITH SERIOUS COMORBIDITY AND BODY MASS INDEX (BMI) OF 40.0 TO 44.9 IN ADULT: Primary | ICD-10-CM

## 2024-10-04 DIAGNOSIS — I10 PRIMARY HYPERTENSION: ICD-10-CM

## 2024-10-04 DIAGNOSIS — E66.813 CLASS 3 SEVERE OBESITY DUE TO EXCESS CALORIES WITH SERIOUS COMORBIDITY AND BODY MASS INDEX (BMI) OF 40.0 TO 44.9 IN ADULT: Primary | ICD-10-CM

## 2024-10-04 DIAGNOSIS — I48.19 PERSISTENT ATRIAL FIBRILLATION WITH RAPID VENTRICULAR RESPONSE (HCC): ICD-10-CM

## 2024-10-04 PROCEDURE — 3078F DIAST BP <80 MM HG: CPT | Performed by: FAMILY MEDICINE

## 2024-10-04 PROCEDURE — 99214 OFFICE O/P EST MOD 30 MIN: CPT | Performed by: FAMILY MEDICINE

## 2024-10-04 PROCEDURE — 3074F SYST BP LT 130 MM HG: CPT | Performed by: FAMILY MEDICINE

## 2024-10-04 ASSESSMENT — PATIENT HEALTH QUESTIONNAIRE - PHQ9
SUM OF ALL RESPONSES TO PHQ9 QUESTIONS 1 & 2: 0
SUM OF ALL RESPONSES TO PHQ QUESTIONS 1-9: 0
2. FEELING DOWN, DEPRESSED OR HOPELESS: NOT AT ALL
1. LITTLE INTEREST OR PLEASURE IN DOING THINGS: NOT AT ALL

## 2024-10-04 NOTE — PROGRESS NOTES
Identified pt with two pt identifiers (name and ). Reviewed chart in preparation for visit and have obtained necessary documentation.    Osito Ruff is a 64 y.o. male  Chief Complaint   Patient presents with    Weight Management    Medication Management     /64   Pulse 66   Temp 97.5 °F (36.4 °C)   Ht 1.88 m (6' 2\")   Wt (!) 148.3 kg (327 lb)   BMI 41.98 kg/m²     1. Have you been to the ER, urgent care clinic since your last visit?  Hospitalized since your last visit?no    2. Have you seen or consulted any other health care providers outside of the [facility] since your last visit?  Include any pap smears or colon screening. no    
last week? no    Review of Systems  Since your last visit, have you experienced any complications? no  If yes, please list:       Are you taking an appetite suppressant? yes  If so, is there any Chest Pain, Palpitations or Dizziness?    HUNGER CONTROL: eating less of the icecream since started the wegovy    BP Readings from Last 3 Encounters:   10/04/24 114/64   10/02/24 130/70   09/24/24 108/65       SLEEP:  7-9    Have you received any other medical care this week? no  If yes, where and for what?       Have you discontinued or changed any medicine or dose of your medicine since your last visit with Dr Villalba? no  If yes, where and for what?         Diet  How many ounces of calorie-free liquids did you consume each day?  64 oz    How many meal replacements did you take each day? 2 and a meal    Did you have any problems adhering to the program?  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  no     If yes, where?      Labs: repeat in Dec        Objective  /64   Pulse 66   Temp 97.5 °F (36.4 °C)   Ht 1.88 m (6' 2\")   Wt (!) 148.3 kg (327 lb)   BMI 41.98 kg/m²   No LMP for male patient.                    PHYSICAL EXAMINATION:  [ INSTRUCTIONS:  \"[x]\" Indicates a positive item  \"[]\" Indicates a negative item  -- DELETE ALL ITEMS NOT EXAMINED]  Vital Signs: (As obtained by patient/caregiver at home)  /64   Pulse 66   Temp 97.5 °F (36.4 °C)   Ht 1.88 m (6' 2\")   Wt (!) 148.3 kg (327 lb)   BMI 41.98 kg/m²      Constitutional: [x] Appears well-developed and well-nourished [x] No apparent distress      [] Abnormal -     Mental status: [x] Alert and awake  [x] Oriented to person/place/time [x] Able to follow commands    [] Abnormal -     Eyes:   EOM    [x]  Normal    [] Abnormal -   Sclera  [x]  Normal    [] Abnormal -          Discharge [x]  None visible   [] Abnormal -     HENT: [x] Normocephalic,

## 2024-10-07 ENCOUNTER — TELEPHONE (OUTPATIENT)
Age: 64
End: 2024-10-07

## 2024-10-07 NOTE — TELEPHONE ENCOUNTER
Misti from [facility] H[facility] department would like a call back to discuss patient's return to work letter. Her call back number is [phone]

## 2024-10-07 NOTE — TELEPHONE ENCOUNTER
Spoke to Misti, she had just called to clarify and just wanted to be sure before they finalize the forms and allow patient to return to work if the provider is clear on patients job, as the last time she saw and spoke to patient they were unsure if he could even do his job, and now we are letting him return to work on 4 hours of work days and she is just concerned if the patient can even do that, as he is a  on his feet all day long and if he can't do the full job at 4 hours then they dont want to set him up for something he can't do effectively, she said she fully trusted the providers judgement and was not at all questioning her but just wanted to make sure the patient had provided her with clear job function and wanted to explain to us that as well.

## 2024-10-10 ENCOUNTER — TELEMEDICINE (OUTPATIENT)
Age: 64
End: 2024-10-10

## 2024-10-10 DIAGNOSIS — E66.813 CLASS 3 SEVERE OBESITY DUE TO EXCESS CALORIES WITH SERIOUS COMORBIDITY AND BODY MASS INDEX (BMI) OF 40.0 TO 44.9 IN ADULT: Primary | ICD-10-CM

## 2024-10-10 DIAGNOSIS — E66.01 CLASS 3 SEVERE OBESITY DUE TO EXCESS CALORIES WITH SERIOUS COMORBIDITY AND BODY MASS INDEX (BMI) OF 40.0 TO 44.9 IN ADULT: Primary | ICD-10-CM

## 2024-10-10 NOTE — PROGRESS NOTES
[facility]  Metabolic Program Follow-up Nutrition Consult    Date: 10/10/2024   Physician: ANETA Villalba MD  Name: Osito Ruff  :  1960    Type of Plan: LCD  Weeks on Plan: 4 months  Virtual visit was completed through Zoom.    ASSESSMENT:    Medications/Supplements:   Prior to Admission medications    Medication Sig Start Date End Date Taking? Authorizing Provider   empagliflozin (JARDIANCE) 10 MG tablet Take 1 tablet by mouth daily 10/2/24   Ed Abdul MD   Semaglutide-Weight Management (WEGOVY) 0.5 MG/0.5ML SOAJ SC injection Inject 0.5 mg into the skin every 7 days 24   Laura Villalba MD   sacubitril-valsartan (ENTRESTO) 24-26 MG per tablet Take 1 tablet by mouth 2 times daily 24   Ed Abdul MD   potassium chloride (KLOR-CON M10) 10 MEQ extended release tablet TAKE 1 TABLET BY MOUTH EVERY DAY 24   Ed Abdul MD   furosemide (LASIX) 40 MG tablet Take 1 tablet by mouth daily 24   Ed Abdul MD   apixaban (ELIQUIS) 5 MG TABS tablet Take 1 tablet by mouth 2 times daily 4/3/24   Ed Abdul MD   acetaminophen (TYLENOL) 500 MG tablet Take by mouth every 6 hours as needed    Automatic Reconciliation, Ar              Starting Weight: 315#  Current Weight: 327#  Overall Pounds Lost: 0 Overall Pounds Gained: 12#    Exercise/Physical Activity:not reported    Is patient using [employer] [facility] products:yes  If yes, how many per day:2    Aversions/side effects of product/program:none    Fluids used to mix with products:ater    Reported Diet History:  1 ND shakes breakfast, 1 ND shake lunch  Dinners: rotates chicken pot pie, meatloaf, salmon with salad or vegetables.  Snacks: corn chips every day. Says he take about 2-4 days to eat the whole bag of corn chips.  Having them with shakes and snacks.  Beverages: Coke zero 28+ ounces a day, Sweet tea once a week. Accidentally bought regular coke most recently and having 36-48 ounces a day (3-4

## 2024-10-15 NOTE — TELEPHONE ENCOUNTER
Spoke to Misti and advised her that per Eduarda when she tried to take him out of work the response she got was kind of in question as to why, so then the next step was to work restricted hours, Eduarda is aware that patients job is on his feet most of the day but she can't justify taking him out of work completely as the response she got from the leave dept was kind of saying that they wouldn't approve it.

## 2024-10-22 DIAGNOSIS — E66.01 CLASS 3 SEVERE OBESITY DUE TO EXCESS CALORIES WITH SERIOUS COMORBIDITY AND BODY MASS INDEX (BMI) OF 40.0 TO 44.9 IN ADULT: Primary | ICD-10-CM

## 2024-10-22 DIAGNOSIS — E66.01 CLASS 3 SEVERE OBESITY DUE TO EXCESS CALORIES WITH SERIOUS COMORBIDITY AND BODY MASS INDEX (BMI) OF 40.0 TO 44.9 IN ADULT: ICD-10-CM

## 2024-10-22 DIAGNOSIS — E66.813 CLASS 3 SEVERE OBESITY DUE TO EXCESS CALORIES WITH SERIOUS COMORBIDITY AND BODY MASS INDEX (BMI) OF 40.0 TO 44.9 IN ADULT: ICD-10-CM

## 2024-10-22 DIAGNOSIS — E66.813 CLASS 3 SEVERE OBESITY DUE TO EXCESS CALORIES WITH SERIOUS COMORBIDITY AND BODY MASS INDEX (BMI) OF 40.0 TO 44.9 IN ADULT: Primary | ICD-10-CM

## 2024-10-22 RX ORDER — SEMAGLUTIDE 0.5 MG/.5ML
0.5 INJECTION, SOLUTION SUBCUTANEOUS
OUTPATIENT
Start: 2024-10-22

## 2024-10-22 RX ORDER — SEMAGLUTIDE 1 MG/.5ML
1 INJECTION, SOLUTION SUBCUTANEOUS
Qty: 2 ML | Refills: 0 | Status: SHIPPED | OUTPATIENT
Start: 2024-10-22

## 2024-10-24 ENCOUNTER — TELEPHONE (OUTPATIENT)
Age: 64
End: 2024-10-24

## 2024-10-24 NOTE — TELEPHONE ENCOUNTER
Patient's brother called wanting to reschedule a procedure for the patient. He is on the PHI for the patient.     Future Appointments   Date Time Provider Department Center   11/1/2024 11:00 AM Ed Abdul MD [facility] AMB   11/22/2024 10:00 AM [facility] ECHO 2 [facility] AMB   11/26/2024  9:15 AM Laura Villalba MD [facility] AMB   12/20/2024 10:30 AM Eduarda Candelaria APRN - NP [facility]   2/14/2025 11:00 AM Ivis Westbrook APRN - CNP [facility] BS AMB   3/4/2025  1:00 PM PACEMAKER3, BEN MATSON BS AMB   6/12/2025  9:20 AM PACEMAKER3, BEN MATSON BS AMB   6/12/2025  9:40 AM Arben Olmos MD [facility] AMB       Mirtha Gill

## 2024-10-25 DIAGNOSIS — I48.19 PERSISTENT ATRIAL FIBRILLATION WITH RAPID VENTRICULAR RESPONSE (HCC): ICD-10-CM

## 2024-10-25 DIAGNOSIS — R06.02 SHORTNESS OF BREATH: ICD-10-CM

## 2024-10-25 DIAGNOSIS — I50.9 CONGESTIVE HEART FAILURE, UNSPECIFIED HF CHRONICITY, UNSPECIFIED HEART FAILURE TYPE (HCC): ICD-10-CM

## 2024-10-25 LAB
ANION GAP SERPL CALC-SCNC: 7 MMOL/L (ref 2–12)
BUN SERPL-MCNC: 13 MG/DL (ref 6–20)
BUN/CREAT SERPL: 16 (ref 12–20)
CALCIUM SERPL-MCNC: 8.9 MG/DL (ref 8.5–10.1)
CHLORIDE SERPL-SCNC: 104 MMOL/L (ref 97–108)
CO2 SERPL-SCNC: 29 MMOL/L (ref 21–32)
CREAT SERPL-MCNC: 0.8 MG/DL (ref 0.7–1.3)
GLUCOSE SERPL-MCNC: 85 MG/DL (ref 65–100)
POTASSIUM SERPL-SCNC: 4.3 MMOL/L (ref 3.5–5.1)
SODIUM SERPL-SCNC: 140 MMOL/L (ref 136–145)

## 2024-10-25 NOTE — TELEPHONE ENCOUNTER
Verified patient with two types of identifiers.  Spoke with brother Joseph, verified with PHI.  Brother is calling to schedule patient for BIV PPM upgrade.  Informed brother that patient is currently scheduled for February, but we can put him on cancellation list.  Patient verbalized understanding and will call with any other questions.

## 2024-11-14 DIAGNOSIS — I50.9 CONGESTIVE HEART FAILURE, UNSPECIFIED HF CHRONICITY, UNSPECIFIED HEART FAILURE TYPE (HCC): Primary | ICD-10-CM

## 2024-11-20 ENCOUNTER — TELEPHONE (OUTPATIENT)
Age: 64
End: 2024-11-20

## 2024-11-20 NOTE — TELEPHONE ENCOUNTER
Verified patient with two types of identifiers.  Spoke with brother Joseph, verified with PHI.  Brother is calling to see if patient's pacemaker upgrade can be moved up.  He is concerned because yesterday, patient had been seated in a chair for a prolonged period of time.  When he stood up, he got very dizzy and had to sit back down.  He says that sister checked his BP at that time, but he is unsure of the reading; states that it was very different from his normal readings.  Informed brother that there are currently no earlier opening for his procedure, but patient is on the cancellation list.  Requested that patient send transmission from home monitor to see if he had rhythm issues at the time of the event.  Patient verbalized understanding and will call with any other questions.

## 2024-11-21 NOTE — TELEPHONE ENCOUNTER
Remote transmission received 11/20. No new events noted. AF episodes are rate controlled, burden now 66%. RV lead triggering high thresholds due to programmed pulse width of 1 ms. RV impedances remain high, but stable.

## 2024-11-22 ENCOUNTER — CLINICAL DOCUMENTATION (OUTPATIENT)
Age: 64
End: 2024-11-22

## 2024-11-22 NOTE — TELEPHONE ENCOUNTER
Verified patient with two types of identifiers.  Spoke with brother Joseph, verified with PHI.  Informed brother of no new events and rate controlled afib.  Let Joseph know we will call back next week with clearer plan regarding device check and procedures.  Patient verbalized understanding and will call with any other questions.

## 2024-11-22 NOTE — PROGRESS NOTES
1/4/2023 when I changed his pacemaker his RV status was  No R wave sensed, pacing impedance 1482 ohms, chronic pacing threshold elevation 2.75 V @ 0.4 ms    Recently RV pacing threshold is 2.5 V @ 1 ms  Output is 5 V @ 1 ms    He is scheduled for BIV pacer upgrade, will need to recheck RV pacing threshold and also schedule to add new RV pacing lead by abandoning it or extraction and this depends on venogram of the arm and the urgency of the lead status  My nurse will call him in office to check the RV pacing threshold and margin of safety and schedule left arm venogram to check the vein patency out    Future Appointments   Date Time Provider Department Center   11/26/2024  9:15 AM Laura Villalba MD [facility] AMB   12/20/2024 10:30 AM Eduarda Candelaria APRN - NP [facility]   2/14/2025 11:00 AM Ivis Westbrook APRN - CNP [facility] BS AMB   3/4/2025  1:00 PM [email or web address] BS AMB   6/12/2025  9:20 AM PACEMAKER3, BEN MATSON BS AMB   6/12/2025  9:40 AM Arben Olmos MD [facility] AMB

## 2024-11-25 ENCOUNTER — TELEPHONE (OUTPATIENT)
Age: 64
End: 2024-11-25

## 2024-11-25 NOTE — TELEPHONE ENCOUNTER
Patient called in requesting to please speak with nurse Guilherme. I asked what was it in reference to, patient just stated he wanted to speak with Guilherme .      Patient #~ [phone]

## 2024-11-26 ENCOUNTER — PROCEDURE VISIT (OUTPATIENT)
Age: 64
End: 2024-11-26
Payer: COMMERCIAL

## 2024-11-26 DIAGNOSIS — I50.9 CONGESTIVE HEART FAILURE, UNSPECIFIED HF CHRONICITY, UNSPECIFIED HEART FAILURE TYPE (HCC): ICD-10-CM

## 2024-11-26 DIAGNOSIS — R42 ORTHOSTATIC DIZZINESS: ICD-10-CM

## 2024-11-26 DIAGNOSIS — Z95.0 CARDIAC PACEMAKER IN SITU: Primary | ICD-10-CM

## 2024-11-26 DIAGNOSIS — I48.19 PERSISTENT ATRIAL FIBRILLATION WITH RAPID VENTRICULAR RESPONSE (HCC): ICD-10-CM

## 2024-11-26 DIAGNOSIS — I50.22 CHRONIC SYSTOLIC (CONGESTIVE) HEART FAILURE (HCC): ICD-10-CM

## 2024-11-26 PROCEDURE — 93288 INTERROG EVL PM/LDLS PM IP: CPT | Performed by: INTERNAL MEDICINE

## 2024-11-26 NOTE — PATIENT INSTRUCTIONS
Your VENOGRAM procedure has been scheduled for 1/14/25 at 300 PM, at [facility].    Please report to Admitting Department by 130 PM, or 1.5 hours prior to your scheduled procedure. Please bring a list of your current medications and medication bottles, if able, to the hospital on this day.  You will be able to drive after your procedure as you will not be receiving anesthesia.    You will need labs drawn prior to your procedure. Please go to have this done no sooner than 12/14/24 and no later than one week prior to procedure.      You should NOT stop your medications prior to your scheduled procedure.    After your procedure, you will need to follow up with Ivis Westbrook NP as scheduled.

## 2024-12-12 ENCOUNTER — TELEPHONE (OUTPATIENT)
Age: 64
End: 2024-12-12

## 2024-12-13 NOTE — TELEPHONE ENCOUNTER
Spoke to patient's brother. Name noted on permission to release information. Identifiers x 2. 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  Hours have been limited to 4 hours per day.  Denies ongoing dizziness/SOB/BP concern.  Would like to obtain letter stating that patient can return to work full time.  Has upcoming procedure with Dr. Olmos in February/2025. To discuss with MD.

## 2024-12-17 RX ORDER — SACUBITRIL AND VALSARTAN 24; 26 MG/1; MG/1
1 TABLET, FILM COATED ORAL 2 TIMES DAILY
Qty: 60 TABLET | Refills: 3 | Status: SHIPPED | OUTPATIENT
Start: 2024-12-17

## 2024-12-17 NOTE — TELEPHONE ENCOUNTER
Cardiologist: Dr. Abdul    Future Appointments   Date Time Provider Department Center   12/20/2024 10:30 AM Eduarda Candelaria APRN - NP [facility]   1/14/2025  1:45 PM [facility]   2/14/2025 11:00 AM Ivis Westbrook APRN - CNP [facility]   3/4/2025  1:00 PM PACEMAKER3, BEN MATSON BS [facility]   6/12/2025  9:20 AM PACEMAKER3, BEN CORRAL [facility]   6/12/2025  9:40 [facility]       Requested Prescriptions     Signed Prescriptions Disp Refills    ENTRESTO 24-26 MG per tablet 60 tablet 3     Sig: TAKE ONE TABLET BY MOUTH TWICE DAILY     Authorizing Provider: JOHNSON ABDUL     Ordering User: SAMMY AVILA         Refills VO per Dr. Abdul.

## 2024-12-18 NOTE — TELEPHONE ENCOUNTER
Per Dr. Abdul:    If he feels back to normal and he wants to go back to 8 hours daily I okay to proceed     Spoke to patient's brother.  Name noted on permission to release information.  Informed of the above. Recommended that I route message to NP.

## 2024-12-19 ENCOUNTER — TELEPHONE (OUTPATIENT)
Age: 64
End: 2024-12-19

## 2024-12-19 NOTE — TELEPHONE ENCOUNTER
Verified patient with two types of identifiers.   Spoke with Belén at Dental office who states that patient is having routine cleaning done and they will require clearance for this. They are asking if this can state whether pt requires SBE prophylaxis.   Informed her we would fax this to number provided.

## 2024-12-19 NOTE — TELEPHONE ENCOUNTER
Dental office called in stating he had an appointment today but they turned him away because they wanted to get a full clearance from  about his heart health before working on him.      Phone [phone]  Fax [phone]

## 2024-12-19 NOTE — TELEPHONE ENCOUNTER
Mr. Ruff is low risk for significant cardiac complication related to dental cleaning. I wouldn't anticipate that he would need to hold Eliquis prior to having the cleaning done.  No prophylactic antibiotic required from a cardiac perspective.

## 2024-12-19 NOTE — TELEPHONE ENCOUNTER
Faxed NP recommendations to [facility] at fax number [phone].  Fax confirmation received.

## 2024-12-20 ENCOUNTER — OFFICE VISIT (OUTPATIENT)
Age: 64
End: 2024-12-20
Payer: COMMERCIAL

## 2024-12-20 VITALS
SYSTOLIC BLOOD PRESSURE: 128 MMHG | WEIGHT: 315 LBS | BODY MASS INDEX: 39.17 KG/M2 | HEIGHT: 75 IN | OXYGEN SATURATION: 96 % | DIASTOLIC BLOOD PRESSURE: 72 MMHG | HEART RATE: 71 BPM

## 2024-12-20 DIAGNOSIS — R06.09 DOE (DYSPNEA ON EXERTION): ICD-10-CM

## 2024-12-20 DIAGNOSIS — M79.673 FOOT ARCH PAIN, UNSPECIFIED LATERALITY: ICD-10-CM

## 2024-12-20 DIAGNOSIS — E66.01 MORBID OBESITY: ICD-10-CM

## 2024-12-20 DIAGNOSIS — I48.19 PERSISTENT ATRIAL FIBRILLATION (HCC): Primary | ICD-10-CM

## 2024-12-20 PROCEDURE — 3074F SYST BP LT 130 MM HG: CPT | Performed by: INTERNAL MEDICINE

## 2024-12-20 PROCEDURE — 99214 OFFICE O/P EST MOD 30 MIN: CPT | Performed by: INTERNAL MEDICINE

## 2024-12-20 PROCEDURE — 3078F DIAST BP <80 MM HG: CPT | Performed by: INTERNAL MEDICINE

## 2024-12-20 RX ORDER — POTASSIUM CHLORIDE 750 MG/1
TABLET, EXTENDED RELEASE ORAL
COMMUNITY
Start: 2024-12-09 | End: 2024-12-20 | Stop reason: ALTCHOICE

## 2024-12-20 RX ORDER — SEMAGLUTIDE 1.7 MG/.75ML
1.7 INJECTION, SOLUTION SUBCUTANEOUS
Qty: 5 ML | Refills: 1 | Status: SHIPPED | OUTPATIENT
Start: 2024-12-20

## 2024-12-20 ASSESSMENT — ENCOUNTER SYMPTOMS
SHORTNESS OF BREATH: 1
GASTROINTESTINAL NEGATIVE: 1

## 2024-12-20 NOTE — PROGRESS NOTES
Chief Complaint   Patient presents with    Follow-up     \"Have you been to the ER, urgent care clinic since your last visit?  Hospitalized since your last visit?\"    NO    “Have you seen or consulted any other health care providers outside our system since your last visit?”    NO    “Have you had a colorectal cancer screening such as a colonoscopy/FIT/Cologuard?    NO    No colonoscopy on file  No cologuard on file  No FIT/FOBT on file   No flexible sigmoidoscopy on file     “Have you had a diabetic eye exam?”    NO     No diabetic eye exam on file            
Chief Complaint   Patient presents with    SVT    Atrial Fibrillation     \"Have you been to the ER, urgent care clinic since your last visit?  Hospitalized since your last visit?\"    NO    “Have you seen or consulted any other health care providers outside our system since your last visit?”    NO    “Have you had a colorectal cancer screening such as a colonoscopy/FIT/Cologuard?    NO    No colonoscopy on file  No cologuard on file  No FIT/FOBT on file   No flexible sigmoidoscopy on file     “Have you had a diabetic eye exam?”    NO     No diabetic eye exam on file            
  Respiratory:  Positive for shortness of breath.    Cardiovascular:  Negative for chest pain.   Gastrointestinal: Negative.    Musculoskeletal:  Positive for gait problem.         Objective    Physical Exam   Physical Exam  Lungs were auscultated and clear.   S1 and S2 heard, RR  Patient takes fast breaths but able to slow his breath down when asked   Gait is steady but slow. With orthopedic shoes on  Obese       Assessment & Plan    Assessment & Plan  1. Atrial fibrillation.  He is currently awaiting the installation of a biventricular pacemaker, scheduled for February 2025. His dizziness is likely due to the pacemaker not functioning optimally, contributing to his pulmonary hypertension. He will remain out of work until his follow-up appointment in 3.5 weeks.    2. Foot issues.  He has been experiencing calluses on his feet, one of which was infected and treated with oral and topical antibiotics. He has been prescribed new shoes, but his foot continues to slip out of them. A high arch insert will be tried first, and if ineffective, a custom orthotic will be considered. His foot issues are not fully resolved, impacting his ability to work.    3. Weight management.  His weight has remained stable despite being on Wegovy for several months. The dosage of Wegovy will be increased to 1.7 mg weekly to assess if a higher dose yields better results. He is advised to maintain a healthy, balanced diet and engage in regular physical activity as tolerated.    Follow-up  The patient will follow up in 3.5 weeks.    PROCEDURE  The patient underwent a heart catheterization.    Osito was seen today for follow-up.    Diagnoses and all orders for this visit:    Persistent atrial fibrillation (HCC)    Foot arch pain, unspecified laterality    HARDIN (dyspnea on exertion)    Morbid obesity  -     Semaglutide-Weight Management (WEGOVY) 1.7 MG/0.75ML SOAJ SC injection; Inject 1.7 mg into the skin every 7 days    Will keep patient out until

## 2024-12-30 DIAGNOSIS — Z95.0 CARDIAC PACEMAKER IN SITU: ICD-10-CM

## 2024-12-30 DIAGNOSIS — I48.19 PERSISTENT ATRIAL FIBRILLATION WITH RAPID VENTRICULAR RESPONSE (HCC): ICD-10-CM

## 2024-12-30 DIAGNOSIS — I50.9 CONGESTIVE HEART FAILURE, UNSPECIFIED HF CHRONICITY, UNSPECIFIED HEART FAILURE TYPE (HCC): ICD-10-CM

## 2024-12-30 DIAGNOSIS — R42 ORTHOSTATIC DIZZINESS: ICD-10-CM

## 2024-12-30 LAB
ANION GAP SERPL CALC-SCNC: 12 MMOL/L (ref 2–12)
BUN SERPL-MCNC: 11 MG/DL (ref 6–20)
BUN/CREAT SERPL: 13 (ref 12–20)
CALCIUM SERPL-MCNC: 8.8 MG/DL (ref 8.5–10.1)
CHLORIDE SERPL-SCNC: 102 MMOL/L (ref 97–108)
CO2 SERPL-SCNC: 22 MMOL/L (ref 21–32)
CREAT SERPL-MCNC: 0.86 MG/DL (ref 0.7–1.3)
ERYTHROCYTE [DISTWIDTH] IN BLOOD BY AUTOMATED COUNT: 12.5 % (ref 11.5–14.5)
GLUCOSE SERPL-MCNC: 136 MG/DL (ref 65–100)
HCT VFR BLD AUTO: 47.8 % (ref 36.6–50.3)
HGB BLD-MCNC: 15.6 G/DL (ref 12.1–17)
MCH RBC QN AUTO: 32.3 PG (ref 26–34)
MCHC RBC AUTO-ENTMCNC: 32.6 G/DL (ref 30–36.5)
MCV RBC AUTO: 99 FL (ref 80–99)
NRBC # BLD: 0 K/UL (ref 0–0.01)
NRBC BLD-RTO: 0 PER 100 WBC
PLATELET # BLD AUTO: 193 K/UL (ref 150–400)
PMV BLD AUTO: 9.3 FL (ref 8.9–12.9)
POTASSIUM SERPL-SCNC: 4.2 MMOL/L (ref 3.5–5.1)
RBC # BLD AUTO: 4.83 M/UL (ref 4.1–5.7)
SODIUM SERPL-SCNC: 136 MMOL/L (ref 136–145)
WBC # BLD AUTO: 6.8 K/UL (ref 4.1–11.1)

## 2025-01-08 ENCOUNTER — TELEPHONE (OUTPATIENT)
Age: 65
End: 2025-01-08

## 2025-01-08 ENCOUNTER — PREP FOR PROCEDURE (OUTPATIENT)
Age: 65
End: 2025-01-08

## 2025-01-08 RX ORDER — SODIUM CHLORIDE 0.9 % (FLUSH) 0.9 %
5-40 SYRINGE (ML) INJECTION PRN
Status: CANCELLED | OUTPATIENT
Start: 2025-01-08

## 2025-01-08 RX ORDER — SODIUM CHLORIDE 0.9 % (FLUSH) 0.9 %
5-40 SYRINGE (ML) INJECTION EVERY 12 HOURS SCHEDULED
Status: CANCELLED | OUTPATIENT
Start: 2025-01-08

## 2025-01-08 RX ORDER — SODIUM CHLORIDE 9 MG/ML
INJECTION, SOLUTION INTRAVENOUS PRN
Status: CANCELLED | OUTPATIENT
Start: 2025-01-08

## 2025-01-08 NOTE — TELEPHONE ENCOUNTER
Verified patient with two types of identifiers.   Spoke with brother Joseph listed on PHI and advised of procedure time change. He will inform patient and let us know if there are any issues.

## 2025-01-11 SDOH — ECONOMIC STABILITY: FOOD INSECURITY: WITHIN THE PAST 12 MONTHS, YOU WORRIED THAT YOUR FOOD WOULD RUN OUT BEFORE YOU GOT MONEY TO BUY MORE.: NEVER TRUE

## 2025-01-11 SDOH — ECONOMIC STABILITY: FOOD INSECURITY: WITHIN THE PAST 12 MONTHS, THE FOOD YOU BOUGHT JUST DIDN'T LAST AND YOU DIDN'T HAVE MONEY TO GET MORE.: NEVER TRUE

## 2025-01-11 SDOH — ECONOMIC STABILITY: INCOME INSECURITY: IN THE LAST 12 MONTHS, WAS THERE A TIME WHEN YOU WERE NOT ABLE TO PAY THE MORTGAGE OR RENT ON TIME?: NO

## 2025-01-11 SDOH — ECONOMIC STABILITY: TRANSPORTATION INSECURITY
IN THE PAST 12 MONTHS, HAS THE LACK OF TRANSPORTATION KEPT YOU FROM MEDICAL APPOINTMENTS OR FROM GETTING MEDICATIONS?: NO

## 2025-01-14 ENCOUNTER — HOSPITAL ENCOUNTER (OUTPATIENT)
Facility: HOSPITAL | Age: 65
Discharge: HOME | End: 2025-01-14
Attending: INTERNAL MEDICINE | Admitting: INTERNAL MEDICINE
Payer: COMMERCIAL

## 2025-01-14 ENCOUNTER — OFFICE VISIT (OUTPATIENT)
Age: 65
End: 2025-01-14
Payer: COMMERCIAL

## 2025-01-14 VITALS
RESPIRATION RATE: 17 BRPM | SYSTOLIC BLOOD PRESSURE: 110 MMHG | DIASTOLIC BLOOD PRESSURE: 66 MMHG | OXYGEN SATURATION: 95 % | HEIGHT: 75 IN | BODY MASS INDEX: 39.17 KG/M2 | HEART RATE: 68 BPM | WEIGHT: 315 LBS

## 2025-01-14 VITALS
RESPIRATION RATE: 19 BRPM | TEMPERATURE: 98.3 F | WEIGHT: 315 LBS | HEART RATE: 60 BPM | OXYGEN SATURATION: 97 % | DIASTOLIC BLOOD PRESSURE: 80 MMHG | BODY MASS INDEX: 39.17 KG/M2 | SYSTOLIC BLOOD PRESSURE: 124 MMHG | HEIGHT: 75 IN

## 2025-01-14 VITALS
SYSTOLIC BLOOD PRESSURE: 116 MMHG | HEIGHT: 75 IN | TEMPERATURE: 97.7 F | DIASTOLIC BLOOD PRESSURE: 75 MMHG | WEIGHT: 315 LBS | BODY MASS INDEX: 39.17 KG/M2 | RESPIRATION RATE: 22 BRPM | OXYGEN SATURATION: 97 % | HEART RATE: 62 BPM

## 2025-01-14 DIAGNOSIS — I48.19 PERSISTENT ATRIAL FIBRILLATION WITH RAPID VENTRICULAR RESPONSE (HCC): ICD-10-CM

## 2025-01-14 DIAGNOSIS — E66.01 CLASS 3 SEVERE OBESITY DUE TO EXCESS CALORIES WITH SERIOUS COMORBIDITY AND BODY MASS INDEX (BMI) OF 40.0 TO 44.9 IN ADULT: Primary | ICD-10-CM

## 2025-01-14 DIAGNOSIS — I44.2 COMPLETE AV BLOCK DUE TO AV NODAL ABLATION (HCC): Primary | ICD-10-CM

## 2025-01-14 DIAGNOSIS — I48.19 PERSISTENT ATRIAL FIBRILLATION (HCC): Primary | ICD-10-CM

## 2025-01-14 DIAGNOSIS — Z95.0 PACEMAKER: ICD-10-CM

## 2025-01-14 DIAGNOSIS — I50.22 CHRONIC SYSTOLIC (CONGESTIVE) HEART FAILURE (HCC): ICD-10-CM

## 2025-01-14 DIAGNOSIS — M79.673 FOOT ARCH PAIN, UNSPECIFIED LATERALITY: ICD-10-CM

## 2025-01-14 DIAGNOSIS — T82.110D PACEMAKER LEAD MALFUNCTION, SUBSEQUENT ENCOUNTER: ICD-10-CM

## 2025-01-14 DIAGNOSIS — R42 DIZZINESS: ICD-10-CM

## 2025-01-14 DIAGNOSIS — R06.09 DOE (DYSPNEA ON EXERTION): ICD-10-CM

## 2025-01-14 DIAGNOSIS — I10 PRIMARY HYPERTENSION: ICD-10-CM

## 2025-01-14 DIAGNOSIS — I97.190 COMPLETE AV BLOCK DUE TO AV NODAL ABLATION (HCC): Primary | ICD-10-CM

## 2025-01-14 DIAGNOSIS — E11.65 CONTROLLED TYPE 2 DIABETES MELLITUS WITH HYPERGLYCEMIA, WITHOUT LONG-TERM CURRENT USE OF INSULIN (HCC): ICD-10-CM

## 2025-01-14 DIAGNOSIS — E66.813 CLASS 3 SEVERE OBESITY DUE TO EXCESS CALORIES WITH SERIOUS COMORBIDITY AND BODY MASS INDEX (BMI) OF 40.0 TO 44.9 IN ADULT: Primary | ICD-10-CM

## 2025-01-14 DIAGNOSIS — I44.2 COMPLETE AV BLOCK DUE TO AV NODAL ABLATION (HCC): ICD-10-CM

## 2025-01-14 DIAGNOSIS — I97.190 COMPLETE AV BLOCK DUE TO AV NODAL ABLATION (HCC): ICD-10-CM

## 2025-01-14 PROBLEM — I87.1 STENOSIS OF LEFT SUBCLAVIAN VEIN: Status: ACTIVE | Noted: 2025-01-14

## 2025-01-14 LAB
ECHO BSA: 2.77 M2
GLUCOSE BLD STRIP.AUTO-MCNC: 105 MG/DL (ref 65–117)
SERVICE CMNT-IMP: NORMAL

## 2025-01-14 PROCEDURE — 3078F DIAST BP <80 MM HG: CPT | Performed by: INTERNAL MEDICINE

## 2025-01-14 PROCEDURE — 3078F DIAST BP <80 MM HG: CPT | Performed by: FAMILY MEDICINE

## 2025-01-14 PROCEDURE — 82962 GLUCOSE BLOOD TEST: CPT

## 2025-01-14 PROCEDURE — 3074F SYST BP LT 130 MM HG: CPT | Performed by: FAMILY MEDICINE

## 2025-01-14 PROCEDURE — 6360000004 HC RX CONTRAST MEDICATION: Performed by: INTERNAL MEDICINE

## 2025-01-14 PROCEDURE — 3074F SYST BP LT 130 MM HG: CPT | Performed by: INTERNAL MEDICINE

## 2025-01-14 PROCEDURE — 99214 OFFICE O/P EST MOD 30 MIN: CPT | Performed by: INTERNAL MEDICINE

## 2025-01-14 PROCEDURE — 99214 OFFICE O/P EST MOD 30 MIN: CPT | Performed by: FAMILY MEDICINE

## 2025-01-14 PROCEDURE — 36005 INJECTION EXT VENOGRAPHY: CPT | Performed by: INTERNAL MEDICINE

## 2025-01-14 RX ORDER — IOPAMIDOL 755 MG/ML
INJECTION, SOLUTION INTRAVASCULAR PRN
Status: DISCONTINUED | OUTPATIENT
Start: 2025-01-14 | End: 2025-01-14 | Stop reason: HOSPADM

## 2025-01-14 RX ORDER — SODIUM CHLORIDE 0.9 % (FLUSH) 0.9 %
5-40 SYRINGE (ML) INJECTION EVERY 12 HOURS SCHEDULED
Status: DISCONTINUED | OUTPATIENT
Start: 2025-01-14 | End: 2025-01-14 | Stop reason: HOSPADM

## 2025-01-14 RX ORDER — SODIUM CHLORIDE 9 MG/ML
INJECTION, SOLUTION INTRAVENOUS PRN
Status: DISCONTINUED | OUTPATIENT
Start: 2025-01-14 | End: 2025-01-14 | Stop reason: HOSPADM

## 2025-01-14 RX ORDER — SODIUM CHLORIDE 0.9 % (FLUSH) 0.9 %
5-40 SYRINGE (ML) INJECTION PRN
Status: DISCONTINUED | OUTPATIENT
Start: 2025-01-14 | End: 2025-01-14 | Stop reason: HOSPADM

## 2025-01-14 ASSESSMENT — PATIENT HEALTH QUESTIONNAIRE - PHQ9
1. LITTLE INTEREST OR PLEASURE IN DOING THINGS: NOT AT ALL
SUM OF ALL RESPONSES TO PHQ9 QUESTIONS 1 & 2: 0
SUM OF ALL RESPONSES TO PHQ QUESTIONS 1-9: 0
2. FEELING DOWN, DEPRESSED OR HOPELESS: NOT AT ALL

## 2025-01-14 ASSESSMENT — ENCOUNTER SYMPTOMS
SHORTNESS OF BREATH: 1
GASTROINTESTINAL NEGATIVE: 1

## 2025-01-14 NOTE — PROGRESS NOTES
Chief Complaint   Patient presents with    Follow-up     \"Have you been to the ER, urgent care clinic since your last visit?  Hospitalized since your last visit?\"    NO    “Have you seen or consulted any other health care providers outside our system since your last visit?”    NO    “Have you had a colorectal cancer screening such as a colonoscopy/FIT/Cologuard?    NO    No colonoscopy on file  No cologuard on file  No FIT/FOBT on file   No flexible sigmoidoscopy on file     “Have you had a diabetic eye exam?”    NO     No diabetic eye exam on file

## 2025-01-14 NOTE — H&P
04/01/2024    Narrative  Indication:  Other forms of dyspnea    Exam: PA and lateral views of the chest.    Direct comparison is made to prior CXR dated 4/2023.    Findings: Cardiomediastinal silhouette is within normal limits. Intact pacer  leads extend to the right atrium and right ventricle. Lungs are clear  bilaterally. Pleural spaces are normal. Osseous structures are intact.    Impression  No acute cardiopulmonary disease.      CT Result (most recent):  CT CHEST WO CONTRAST 04/29/2024    Narrative  INDICATION: Restrictive lung disease.  COMPARISON: Chest radiograph 4/1/2024, CT chest 12/1/2022    CONTRAST: None.    TECHNIQUE:  5 mm axial images were obtained through the chest. Coronal and  sagittal reformats were generated.  CT dose reduction was achieved through use  of a standardized protocol tailored for this examination and automatic exposure  control for dose modulation.    The absence of intravenous contrast reduces the sensitivity for evaluation of  the mediastinum, devora, vasculature, and upper abdominal organs.    FINDINGS:    CHEST WALL: No mass or axillary lymphadenopathy.  THYROID: No nodule.  MEDIASTINUM: No mass or lymphadenopathy.  DEVORA: No mass or lymphadenopathy.  THORACIC AORTA: No aneurysm.  MAIN PULMONARY ARTERY: Measures 28 mm which is at the upper limits of normal..  TRACHEA/BRONCHI: Patent.  ESOPHAGUS: No wall thickening or dilatation.  Heart: The heart size is within normal limits. There is a pacemaker present.  There is some calcification along the posterior wall of the left atrium  especially near the left pulmonary veins. This calcification could be related to  previous cardiac ablation. Coronary artery calcification is present.  PLEURA: No effusion or pneumothorax.  LUNGS: The lung windows and high resolution images demonstrate that there is  some respiratory motion and overall a shallow depth of inspiration on the  expiratory images. Expiratory images also demonstrate some mild

## 2025-01-14 NOTE — PROGRESS NOTES
Subjective    Osito Ruff is a 64 y.o. male who presents today for the following:  Chief Complaint   Patient presents with    Follow-up       History of Present Illness  The patient presents for a follow-up visit.    History is reported by other person in the presence of the patient.  He has been experiencing dizziness, particularly when transitioning from a seated to standing position. He has gained 5 pounds over the past 7 months. He is currently employed and uses high arch insoles from a sporting goods store for ambulation. However, these insoles do not appear to be significantly improving his condition. It is believed that he is not yet fit to return to work and is seeking certification for long-term disability until the end of March 2024, which marks the conclusion of his 6-month period. It is anticipated that he will undergo pacemaker implantation during this time. He has been advised on the importance of weight loss and is committed to making necessary lifestyle changes. His blood glucose level was recorded as 130 during a recent lab test. He reports no chest pain at present.    He had a venogram because he is going to have his pacemaker placed. He does have an occlusion. The doctor said that requires special planning when they do that because they have to have a surgical suite ready for him in case of complication.  02/26/2024 was the date before but this may change     He was hoping to get fitted with orthotics at [facility] last month, but it did not happen, so he wants to try to get that done because right now he is working and walking with high arch insoles that he got at a sporting goods store which is better but he does not think it is fixing it.    He is going to begin Zepbound with the weight loss clinic.     PMH/PSH/Allergies/Social History/medication list and most recent studies reviewed with patient.     reports that he has never smoked. He has never been exposed to tobacco smoke.

## 2025-01-14 NOTE — DISCHARGE INSTRUCTIONS
Your next procedure is scheduled when nurse contacts you and your brother  Future Appointments   Date Time Provider Department Center   1/14/2025  1:45 PM Laura Villalba MD [facility] AMB   1/14/2025  2:30 PM Eduarda Candelaria, APRN - NP [facility]   2/14/2025 11:00 AM Ivis Westbrook APRN - CNP [facility] BS AMB   3/4/2025  1:00 PM PACEMAKER3, BEN CORRAL AMB   6/12/2025  9:20 AM PACEMAKER3, BEN MATSON BS AMB   6/12/2025  9:40 AM Arben Olmos MD [facility] AMB

## 2025-01-14 NOTE — PROGRESS NOTES
Cath Lab Recovery Room Arrival Note    Patient arrived to Cardiac Cath/EP Lab Recovery Room for  Venogram Procedure. Staff introduced to patient. Patient identifiers verified with Name and Date of Birth. Procedure verified with patient. Consent forms reviewed and signed by patient or authorized representative and verified. Allergies verified. Patient informed of procedure and plan of care. Questions answered with review.     Patient on cardiac monitor, non-invasive blood pressure, SPO2 monitor. On RA. Patient is A&Ox3. Patient reports No CP SOB.

## 2025-01-14 NOTE — PROGRESS NOTES
EP LAB to Recovery Room Report    Procedure: Venogram of left subclavian    MD: Amarilis    Patient received a total of 25mL Isovue-360, 76% via left hand 22g IV.    Transferred to Cath Lab Recovery [place] [facility].  Handoff given to TAYA Sneed

## 2025-01-14 NOTE — PROGRESS NOTES
Identified pt with two pt identifiers (name and ). Reviewed chart in preparation for visit and have obtained necessary documentation.    Osito Ruff is a 64 y.o. male  Chief Complaint   Patient presents with    Weight Management     Restart program     /75 (Site: Right Upper Arm, Position: Sitting, Cuff Size: Large Adult)   Pulse 62   Temp 97.7 °F (36.5 °C) (Oral)   Resp 22   Ht 1.905 m (6' 3\")   Wt (!) 146.6 kg (323 lb 3.2 oz)   SpO2 97%   BMI 40.40 kg/m²     1. Have you been to the ER, urgent care clinic since your last visit?  Hospitalized since your last visit?no    2. Have you seen or consulted any other health care providers outside of the [facility] since your last visit?  Include any pap smears or colon screening. yes - Had a venogram this morning with Dr. Olmos    BMI - 40.3

## 2025-01-14 NOTE — PROCEDURES
PROCEDURE NOTE  Date: 1/14/2025   Name: Osito Ruff  YOB: 1960    Procedures  Cardiac Procedure Note   Patient: Osito Ruff  MRN: [id number]  SSN: [id number]   YOB: 1960 Age: 64 y.o.  Sex: male    Date of Procedure: 1/14/2025   Pre-procedure Diagnosis: atrial fibrillation, chronic systolic CHF with LVEF 46%, RV pacing lead high pacing threshold and impedance, NYHA class 3, morbid obesity, pulmonary hypertension   Post-procedure Diagnosis: same  Procedure: venogram of the left upper extremity  :  Dr. Arben Olmos MD  Assistant(s):  None  Anesthesia: none  Estimated Blood Loss: none  Specimens Removed: None  Findings: left subclavian vein occluded  RV lead is malfunctional and needs to be replaced and extracted  Will schedule for pacemaker lead extraction at the same time with biventricular pacer implant  Complications: None   Implants:  None  Signed by:  Arben Olmos MD  1/14/2025  10:19 AM

## 2025-01-14 NOTE — PROGRESS NOTES
1010:  TRANSFER - IN REPORT:    Verbal report received from TAYA El on Osito Ruff , from the Cardiac Cath lab, for routine progression of air. Report consisted of patient’s Situation, Background, Assessment and Recommendations(SBAR). Opportunity for questions and clarification was provided. Assessment completed upon patient’s arrival to Cardiac Cath Lab RECOVERY AREA and care assumed.    Cardiac Cath Lab Recovery Arrival Note:     Osito Ruff arrived to [facility] recovery area.  Patient procedure= Venogram. Patient on cardiac monitor, non-invasive blood pressure, Patient status doing well without problems. Patient is A&Ox 4. Patient reports no complaints.     1031: Discharge instructions explained to Osito SPEARS Mónicatonya, all questions answered, and patient verbalized understanding.  Copy of discharge instructions given to patient. Brother updated by phone.  1050:  Patient taken out via wheelchair. Patient discharged to the care of brother Joseph.

## 2025-01-14 NOTE — PROGRESS NOTES
[facility] Progress Note:   F/up Physician Visit    CC: Weight Management      Osito Ruff is a 64 y.o. male who is here for his  f/up physician visit for the  LCD Program.      He has continued to eat icecream , he says it is low fat and low sugar  The wegovy has not decreased the cravings  Oct 327  Now 323    He is still taking wegovy he is at 1.7 now becaused he has missed a few weeks off and on              1/14/2025     2:00 PM 1/14/2025     1:47 PM 1/14/2025     9:30 AM 12/20/2024    10:25 AM 10/4/2024     9:45 AM 10/2/2024    12:56 PM 9/24/2024     6:45 AM   Weight Metrics   Weight  323 lb 3.2 oz 320 lb 323 lb 327 lb 327 lb 322 lb   Neck (Inches) 20.25 in         Waist Measure Inches 58.5 in         Body Fat % 36.2 %         BMI (Calculated)  40.5 kg/m2 40.1 kg/m2 40.5 kg/m2 42.1 kg/m2 41 kg/m2 40.3 kg/m2          No data to display                   Current Outpatient Medications   Medication Sig Dispense Refill    tirzepatide-weight management (ZEPBOUND) 5 MG/0.5ML SOAJ subCUTAneous auto-injector pen Inject 5 mg into the skin every 7 days 2 mL 0    Semaglutide-Weight Management (WEGOVY) 1.7 MG/0.75ML SOAJ SC injection Inject 1.7 mg into the skin every 7 days 5 mL 1    ENTRESTO 24-26 MG per tablet TAKE ONE TABLET BY MOUTH TWICE DAILY 60 tablet 3    empagliflozin (JARDIANCE) 10 MG tablet Take 1 tablet by mouth daily 90 tablet 1    potassium chloride (KLOR-CON M10) 10 MEQ extended release tablet TAKE 1 TABLET BY MOUTH EVERY DAY 90 tablet 3    furosemide (LASIX) 40 MG tablet Take 1 tablet by mouth daily 90 tablet 1    apixaban (ELIQUIS) 5 MG TABS tablet Take 1 tablet by mouth 2 times daily 180 tablet 1    acetaminophen (TYLENOL) 500 MG tablet Take by mouth every 6 hours as needed       No current facility-administered medications for this visit.          Participation   Did you attend clinic and class last week? no    Review of Systems  Since your last visit, have you experienced any

## 2025-01-15 ENCOUNTER — CLINICAL DOCUMENTATION (OUTPATIENT)
Age: 65
End: 2025-01-15

## 2025-01-15 NOTE — PROGRESS NOTES
Prior Authorization for Zepbound 5 mg/0.5 ml started in Epic.    Awaiting completed office note from Dr. Villalba from 1/14/2025 office visit.    Case ID: [id number]

## 2025-01-16 ENCOUNTER — TELEPHONE (OUTPATIENT)
Age: 65
End: 2025-01-16

## 2025-01-16 NOTE — PROGRESS NOTES
Prior Authorization for Zepbound 5 mg/0.5 ml APPROVED by [facility]    1/16/2025 through 8/28/2025    Case ID: [id number]    Patient notified via [employer] message.

## 2025-01-16 NOTE — PROGRESS NOTES
Prior Authorization for Zepbound 5 mg/0.5 mlsent to [facility] via [facility].    Case ID: [id number]    Received Dr. Villalba's completed office note.    Awaiting determination.

## 2025-01-16 NOTE — TELEPHONE ENCOUNTER
Contacted patient to schedule WLC consult with dietitinadya Swartz and monthly follow up with Dr Villalba; no answer; left VM

## 2025-01-22 ENCOUNTER — TELEPHONE (OUTPATIENT)
Age: 65
End: 2025-01-22

## 2025-01-22 NOTE — TELEPHONE ENCOUNTER
Pt calling about status of his long term disability form he sent via my chart on 1/14/25  Please call

## 2025-01-24 RX ORDER — FUROSEMIDE 40 MG/1
40 TABLET ORAL DAILY
Qty: 90 TABLET | Refills: 0 | Status: SHIPPED | OUTPATIENT
Start: 2025-01-24

## 2025-01-24 NOTE — TELEPHONE ENCOUNTER
Requested Prescriptions     Signed Prescriptions Disp Refills    furosemide (LASIX) 40 MG tablet 90 tablet 0     Sig: Take 1 tablet by mouth daily (Please schedule follow up with Dr. Abdul)     Authorizing Provider: JOHNSON ABDUL     Ordering User: THEODORE BLAKE     Verbal order per Dr. Abdul.    Future Appointments   Date Time Provider Department Center   1/27/2025 11:00 AM Sheridan Turcios RD [facility] BS AMB   2/14/2025 11:00 AM Ivis Westbrook APRN - CNP [facility] BS AMB   2/25/2025  1:30 PM Laura Villalba MD [facility] BS AMB   3/4/2025  1:00 PM PACEMAKER3, BEN MATSON BS AMB   3/17/2025 11:00 AM Eduarda Candelaria APRN - NP [facility] DEP   6/12/2025  9:20 AM PACEMAKER3, BEN MATSON BS AMB   6/12/2025  9:40 AM Arben Olmos MD [facility] AMB

## 2025-01-27 ENCOUNTER — TELEMEDICINE (OUTPATIENT)
Age: 65
End: 2025-01-27
Payer: COMMERCIAL

## 2025-01-27 ENCOUNTER — TELEPHONE (OUTPATIENT)
Age: 65
End: 2025-01-27

## 2025-01-27 DIAGNOSIS — E66.813 CLASS 3 SEVERE OBESITY DUE TO EXCESS CALORIES WITH SERIOUS COMORBIDITY AND BODY MASS INDEX (BMI) OF 40.0 TO 44.9 IN ADULT: Primary | ICD-10-CM

## 2025-01-27 DIAGNOSIS — E66.01 CLASS 3 SEVERE OBESITY DUE TO EXCESS CALORIES WITH SERIOUS COMORBIDITY AND BODY MASS INDEX (BMI) OF 40.0 TO 44.9 IN ADULT: Primary | ICD-10-CM

## 2025-01-27 PROCEDURE — 97802 MEDICAL NUTRITION INDIV IN: CPT | Performed by: DIETITIAN, REGISTERED

## 2025-01-27 NOTE — PROGRESS NOTES
[facility]  [address]  [place] [address]  Phone: [phone] Fax: [phone]   Nutrition Assessment - Medical Nutrition Therapy   Initial Evaluation         Patient Name: Osito Ruff : 1960   Treatment Diagnosis: Obesity Class 3   Referral Source: Laura Villalba MD Start of Care (SOC): 2025     In time:   11:01am             Out time:   11:24am   Total Treatment Time (min):   23 min     Consent:  Patient and/or their healthcare decision maker is aware that this patient-initiated Telehealth encounter is a billable service, with coverage as determined by her insurance carrier. They are aware that they may receive a bill and has provided verbal consent to proceed: yes, confirmed      Osito Ruff, was evaluated through a synchronous (real-time) audio-video encounter. The patient (or guardian if applicable) is aware that this is a billable service, which includes applicable co-pays. This Virtual Visit was conducted with patient's (and/or legal guardian's) consent. Patient identification was verified, and a caregiver was present when appropriate.   The patient was located at Home: [address] [place] [id number]  Provider was located at Home (not Appt Dept State): [place]  Confirm you are appropriately licensed, registered, or certified to deliver care in the state where the patient is located as indicated above. If you are not or unsure, please re-schedule the visit: Yes, I confirm.      Total time spent for this encounter:  23 min    --ALEX NGUYEN RD on 2025 at 11:01 AM    An electronic signature was used to authenticate this note.    Gender: male Age: 64 y.o.   Ht: 75 in Wt:  323 lb 146 kg   BMI: 40 RMR   Male 2351 RMR Female    Anthropometrics Assessment: Per BMI, pt is considered morbidly obese.      Past Medical History includes: DM II, AFIB, pacemaker, SVT, CHF, HTN     Pertinent Medications:   Lasix, one more month

## 2025-01-27 NOTE — TELEPHONE ENCOUNTER
Contacted patient to completed check- in process/ collect copay for dietitian appointment; patient stated he will pay on [employer]

## 2025-01-28 ENCOUNTER — TELEPHONE (OUTPATIENT)
Age: 65
End: 2025-01-28

## 2025-01-28 DIAGNOSIS — I48.19 PERSISTENT ATRIAL FIBRILLATION WITH RAPID VENTRICULAR RESPONSE (HCC): ICD-10-CM

## 2025-01-28 DIAGNOSIS — I44.2 COMPLETE AV BLOCK DUE TO AV NODAL ABLATION (HCC): ICD-10-CM

## 2025-01-28 DIAGNOSIS — I97.190 COMPLETE AV BLOCK DUE TO AV NODAL ABLATION (HCC): ICD-10-CM

## 2025-01-28 DIAGNOSIS — Z95.0 CARDIAC PACEMAKER IN SITU: ICD-10-CM

## 2025-01-28 DIAGNOSIS — I50.22 CHRONIC SYSTOLIC (CONGESTIVE) HEART FAILURE (HCC): Primary | ICD-10-CM

## 2025-01-28 DIAGNOSIS — T82.110D PACEMAKER LEAD MALFUNCTION, SUBSEQUENT ENCOUNTER: ICD-10-CM

## 2025-01-28 RX ORDER — CHLORHEXIDINE GLUCONATE 40 MG/ML
SOLUTION TOPICAL AS NEEDED
Qty: 1 EACH | Refills: 0 | Status: SHIPPED | OUTPATIENT
Start: 2025-01-28

## 2025-01-28 NOTE — TELEPHONE ENCOUNTER
Verified patient with two types of identifiers.   Spoke with Justin listed on PHI and discussed procedure time change. Informed him I would send updated instructions in [employer]. He is aware that patient will need updated labs ASAP.  He verbalized understanding and will call with any other questions.

## 2025-01-28 NOTE — TELEPHONE ENCOUNTER
Verified patient with two types of identifiers.   Spoke with patient and discussed rescheduling upcoming procedure due to conflicts with procedure time. Rescheduled this to 02/13 @ NOON. Patient aware he will need to be at [facility] at 10 AM. Informed patient I will send updated instructions in [employer].   Patient verbalized understanding and will call with any other questions.

## 2025-02-03 DIAGNOSIS — I44.2 COMPLETE AV BLOCK DUE TO AV NODAL ABLATION (HCC): ICD-10-CM

## 2025-02-03 DIAGNOSIS — T82.110D PACEMAKER LEAD MALFUNCTION, SUBSEQUENT ENCOUNTER: ICD-10-CM

## 2025-02-03 DIAGNOSIS — I50.22 CHRONIC SYSTOLIC (CONGESTIVE) HEART FAILURE (HCC): ICD-10-CM

## 2025-02-03 DIAGNOSIS — I97.190 COMPLETE AV BLOCK DUE TO AV NODAL ABLATION (HCC): ICD-10-CM

## 2025-02-03 DIAGNOSIS — Z95.0 CARDIAC PACEMAKER IN SITU: ICD-10-CM

## 2025-02-03 DIAGNOSIS — I48.19 PERSISTENT ATRIAL FIBRILLATION WITH RAPID VENTRICULAR RESPONSE (HCC): ICD-10-CM

## 2025-02-03 LAB
ANION GAP SERPL CALC-SCNC: 3 MMOL/L (ref 2–12)
BASOPHILS # BLD: 0.03 K/UL (ref 0–0.1)
BASOPHILS NFR BLD: 0.5 % (ref 0–1)
BUN SERPL-MCNC: 13 MG/DL (ref 6–20)
BUN/CREAT SERPL: 17 (ref 12–20)
CALCIUM SERPL-MCNC: 9 MG/DL (ref 8.5–10.1)
CHLORIDE SERPL-SCNC: 104 MMOL/L (ref 97–108)
CO2 SERPL-SCNC: 30 MMOL/L (ref 21–32)
CREAT SERPL-MCNC: 0.76 MG/DL (ref 0.7–1.3)
DIFFERENTIAL METHOD BLD: ABNORMAL
EOSINOPHIL # BLD: 0.07 K/UL (ref 0–0.4)
EOSINOPHIL NFR BLD: 1.2 % (ref 0–7)
ERYTHROCYTE [DISTWIDTH] IN BLOOD BY AUTOMATED COUNT: 12.3 % (ref 11.5–14.5)
GLUCOSE SERPL-MCNC: 93 MG/DL (ref 65–100)
HCT VFR BLD AUTO: 45.6 % (ref 36.6–50.3)
HGB BLD-MCNC: 14.8 G/DL (ref 12.1–17)
IMM GRANULOCYTES # BLD AUTO: 0.02 K/UL (ref 0–0.04)
IMM GRANULOCYTES NFR BLD AUTO: 0.3 % (ref 0–0.5)
LYMPHOCYTES # BLD: 1.73 K/UL (ref 0.8–3.5)
LYMPHOCYTES NFR BLD: 28.9 % (ref 12–49)
MCH RBC QN AUTO: 32.4 PG (ref 26–34)
MCHC RBC AUTO-ENTMCNC: 32.5 G/DL (ref 30–36.5)
MCV RBC AUTO: 99.8 FL (ref 80–99)
MONOCYTES # BLD: 0.47 K/UL (ref 0–1)
MONOCYTES NFR BLD: 7.9 % (ref 5–13)
NEUTS SEG # BLD: 3.66 K/UL (ref 1.8–8)
NEUTS SEG NFR BLD: 61.2 % (ref 32–75)
NRBC # BLD: 0 K/UL (ref 0–0.01)
NRBC BLD-RTO: 0 PER 100 WBC
PLATELET # BLD AUTO: 195 K/UL (ref 150–400)
PMV BLD AUTO: 9.6 FL (ref 8.9–12.9)
POTASSIUM SERPL-SCNC: 4.7 MMOL/L (ref 3.5–5.1)
RBC # BLD AUTO: 4.57 M/UL (ref 4.1–5.7)
SODIUM SERPL-SCNC: 137 MMOL/L (ref 136–145)
WBC # BLD AUTO: 6 K/UL (ref 4.1–11.1)

## 2025-02-05 ENCOUNTER — PREP FOR PROCEDURE (OUTPATIENT)
Age: 65
End: 2025-02-05

## 2025-02-06 RX ORDER — SODIUM CHLORIDE 9 MG/ML
INJECTION, SOLUTION INTRAVENOUS PRN
Status: CANCELLED | OUTPATIENT
Start: 2025-02-06

## 2025-02-06 RX ORDER — SODIUM CHLORIDE 0.9 % (FLUSH) 0.9 %
5-40 SYRINGE (ML) INJECTION EVERY 12 HOURS SCHEDULED
Status: CANCELLED | OUTPATIENT
Start: 2025-02-06

## 2025-02-06 RX ORDER — SODIUM CHLORIDE 0.9 % (FLUSH) 0.9 %
5-40 SYRINGE (ML) INJECTION PRN
Status: CANCELLED | OUTPATIENT
Start: 2025-02-06

## 2025-02-10 DIAGNOSIS — E66.01 MORBID OBESITY: ICD-10-CM

## 2025-02-10 RX ORDER — SEMAGLUTIDE 1.7 MG/.75ML
INJECTION, SOLUTION SUBCUTANEOUS
Qty: 3 ML | Refills: 1 | Status: SHIPPED | OUTPATIENT
Start: 2025-02-10

## 2025-02-13 ENCOUNTER — HOSPITAL ENCOUNTER (OUTPATIENT)
Facility: HOSPITAL | Age: 65
Discharge: HOME | End: 2025-02-13
Attending: INTERNAL MEDICINE | Admitting: INTERNAL MEDICINE
Payer: COMMERCIAL

## 2025-02-13 ENCOUNTER — ANESTHESIA (OUTPATIENT)
Facility: HOSPITAL | Age: 65
End: 2025-02-13
Payer: COMMERCIAL

## 2025-02-13 ENCOUNTER — ANESTHESIA EVENT (OUTPATIENT)
Facility: HOSPITAL | Age: 65
End: 2025-02-13
Payer: COMMERCIAL

## 2025-02-13 ENCOUNTER — APPOINTMENT (OUTPATIENT)
Facility: HOSPITAL | Age: 65
End: 2025-02-13
Attending: INTERNAL MEDICINE
Payer: COMMERCIAL

## 2025-02-13 VITALS
RESPIRATION RATE: 24 BRPM | BODY MASS INDEX: 39.17 KG/M2 | DIASTOLIC BLOOD PRESSURE: 79 MMHG | SYSTOLIC BLOOD PRESSURE: 127 MMHG | HEIGHT: 75 IN | TEMPERATURE: 97.8 F | WEIGHT: 315 LBS | OXYGEN SATURATION: 94 % | HEART RATE: 61 BPM

## 2025-02-13 DIAGNOSIS — Z95.0 BIVENTRICULAR CARDIAC PACEMAKER IN SITU: Primary | ICD-10-CM

## 2025-02-13 DIAGNOSIS — I50.9 CHF (CONGESTIVE HEART FAILURE) (HCC): ICD-10-CM

## 2025-02-13 DIAGNOSIS — Z95.0 CARDIAC PACEMAKER IN SITU: ICD-10-CM

## 2025-02-13 LAB
ABO + RH BLD: NORMAL
BLOOD GROUP ANTIBODIES SERPL: NORMAL
ECHO BSA: 2.76 M2
GLUCOSE BLD STRIP.AUTO-MCNC: 111 MG/DL (ref 65–117)
HISTORY CHECK: NORMAL
SERVICE CMNT-IMP: NORMAL
SPECIMEN EXP DATE BLD: NORMAL

## 2025-02-13 PROCEDURE — 82962 GLUCOSE BLOOD TEST: CPT

## 2025-02-13 PROCEDURE — C1892 INTRO/SHEATH,FIXED,PEEL-AWAY: HCPCS | Performed by: INTERNAL MEDICINE

## 2025-02-13 PROCEDURE — 2580000003 HC RX 258: Performed by: INTERNAL MEDICINE

## 2025-02-13 PROCEDURE — C1769 GUIDE WIRE: HCPCS | Performed by: INTERNAL MEDICINE

## 2025-02-13 PROCEDURE — 36415 COLL VENOUS BLD VENIPUNCTURE: CPT

## 2025-02-13 PROCEDURE — 6360000002 HC RX W HCPCS: Performed by: ANESTHESIOLOGY

## 2025-02-13 PROCEDURE — 86900 BLOOD TYPING SEROLOGIC ABO: CPT

## 2025-02-13 PROCEDURE — 6360000002 HC RX W HCPCS: Performed by: INTERNAL MEDICINE

## 2025-02-13 PROCEDURE — 3700000001 HC ADD 15 MINUTES (ANESTHESIA): Performed by: INTERNAL MEDICINE

## 2025-02-13 PROCEDURE — 2500000003 HC RX 250 WO HCPCS: Performed by: INTERNAL MEDICINE

## 2025-02-13 PROCEDURE — 71045 X-RAY EXAM CHEST 1 VIEW: CPT

## 2025-02-13 PROCEDURE — C2621 PMKR, OTHER THAN SING/DUAL: HCPCS | Performed by: INTERNAL MEDICINE

## 2025-02-13 PROCEDURE — C1898 LEAD, PMKR, OTHER THAN TRANS: HCPCS | Performed by: INTERNAL MEDICINE

## 2025-02-13 PROCEDURE — 33208 INSRT HEART PM ATRIAL & VENT: CPT | Performed by: INTERNAL MEDICINE

## 2025-02-13 PROCEDURE — 33225 L VENTRIC PACING LEAD ADD-ON: CPT | Performed by: INTERNAL MEDICINE

## 2025-02-13 PROCEDURE — C1894 INTRO/SHEATH, NON-LASER: HCPCS | Performed by: INTERNAL MEDICINE

## 2025-02-13 PROCEDURE — 86901 BLOOD TYPING SEROLOGIC RH(D): CPT

## 2025-02-13 PROCEDURE — 3700000000 HC ANESTHESIA ATTENDED CARE: Performed by: INTERNAL MEDICINE

## 2025-02-13 PROCEDURE — 6360000004 HC RX CONTRAST MEDICATION: Performed by: INTERNAL MEDICINE

## 2025-02-13 PROCEDURE — 2709999900 HC NON-CHARGEABLE SUPPLY: Performed by: INTERNAL MEDICINE

## 2025-02-13 PROCEDURE — C1730 CATH, EP, 19 OR FEW ELECT: HCPCS | Performed by: INTERNAL MEDICINE

## 2025-02-13 PROCEDURE — 2580000003 HC RX 258: Performed by: ANESTHESIOLOGY

## 2025-02-13 PROCEDURE — 86850 RBC ANTIBODY SCREEN: CPT

## 2025-02-13 PROCEDURE — 2500000003 HC RX 250 WO HCPCS: Performed by: ANESTHESIOLOGY

## 2025-02-13 PROCEDURE — C1900 LEAD, CORONARY VENOUS: HCPCS | Performed by: INTERNAL MEDICINE

## 2025-02-13 PROCEDURE — C1725 CATH, TRANSLUMIN NON-LASER: HCPCS | Performed by: INTERNAL MEDICINE

## 2025-02-13 PROCEDURE — 2720000010 HC SURG SUPPLY STERILE: Performed by: INTERNAL MEDICINE

## 2025-02-13 PROCEDURE — C1889 IMPLANT/INSERT DEVICE, NOC: HCPCS | Performed by: INTERNAL MEDICINE

## 2025-02-13 DEVICE — ENVELOPE CMRM6133 ABSORB LRG MR
Type: IMPLANTABLE DEVICE | Status: FUNCTIONAL
Brand: TYRX™

## 2025-02-13 DEVICE — LEAD 5076-52 MRI US RCMCRD
Type: IMPLANTABLE DEVICE | Status: FUNCTIONAL
Brand: CAPSUREFIX NOVUS MRI™ SURESCAN®

## 2025-02-13 DEVICE — LEAD 439878 MRI STRAIGHT US
Type: IMPLANTABLE DEVICE | Status: FUNCTIONAL
Brand: ATTAIN PERFORMA™ STRAIGHT MRI SURESCAN™

## 2025-02-13 DEVICE — CRTP W4TR01 PERCEPTA QUAD CRTP MRI US
Type: IMPLANTABLE DEVICE | Status: FUNCTIONAL
Brand: PERCEPTA™ QUAD CRT-P MRI SURESCAN™

## 2025-02-13 RX ORDER — LIDOCAINE HYDROCHLORIDE AND EPINEPHRINE 10; 10 MG/ML; UG/ML
INJECTION, SOLUTION INFILTRATION; PERINEURAL PRN
Status: DISCONTINUED | OUTPATIENT
Start: 2025-02-13 | End: 2025-02-13 | Stop reason: HOSPADM

## 2025-02-13 RX ORDER — DEXMEDETOMIDINE HYDROCHLORIDE 100 UG/ML
INJECTION, SOLUTION INTRAVENOUS
Status: DISCONTINUED | OUTPATIENT
Start: 2025-02-13 | End: 2025-02-13 | Stop reason: SDUPTHER

## 2025-02-13 RX ORDER — SODIUM CHLORIDE 0.9 % (FLUSH) 0.9 %
5-40 SYRINGE (ML) INJECTION EVERY 12 HOURS SCHEDULED
Status: DISCONTINUED | OUTPATIENT
Start: 2025-02-13 | End: 2025-02-13 | Stop reason: HOSPADM

## 2025-02-13 RX ORDER — ONDANSETRON 2 MG/ML
4 INJECTION INTRAMUSCULAR; INTRAVENOUS EVERY 6 HOURS PRN
Status: DISCONTINUED | OUTPATIENT
Start: 2025-02-13 | End: 2025-02-13 | Stop reason: HOSPADM

## 2025-02-13 RX ORDER — SODIUM CHLORIDE 9 MG/ML
INJECTION, SOLUTION INTRAVENOUS PRN
Status: DISCONTINUED | OUTPATIENT
Start: 2025-02-13 | End: 2025-02-13 | Stop reason: HOSPADM

## 2025-02-13 RX ORDER — SODIUM CHLORIDE 0.9 % (FLUSH) 0.9 %
5-40 SYRINGE (ML) INJECTION PRN
Status: DISCONTINUED | OUTPATIENT
Start: 2025-02-13 | End: 2025-02-13 | Stop reason: HOSPADM

## 2025-02-13 RX ORDER — PROPOFOL 10 MG/ML
INJECTION, EMULSION INTRAVENOUS
Status: DISCONTINUED | OUTPATIENT
Start: 2025-02-13 | End: 2025-02-13 | Stop reason: SDUPTHER

## 2025-02-13 RX ORDER — IOPAMIDOL 755 MG/ML
INJECTION, SOLUTION INTRAVASCULAR PRN
Status: DISCONTINUED | OUTPATIENT
Start: 2025-02-13 | End: 2025-02-13 | Stop reason: HOSPADM

## 2025-02-13 RX ORDER — CEPHALEXIN 500 MG/1
500 CAPSULE ORAL 3 TIMES DAILY
Qty: 15 CAPSULE | Refills: 0 | Status: SHIPPED | OUTPATIENT
Start: 2025-02-13

## 2025-02-13 RX ORDER — SODIUM CHLORIDE 9 MG/ML
INJECTION, SOLUTION INTRAVENOUS
Status: DISCONTINUED | OUTPATIENT
Start: 2025-02-13 | End: 2025-02-13 | Stop reason: SDUPTHER

## 2025-02-13 RX ORDER — MIDAZOLAM HYDROCHLORIDE 1 MG/ML
INJECTION, SOLUTION INTRAMUSCULAR; INTRAVENOUS
Status: DISCONTINUED | OUTPATIENT
Start: 2025-02-13 | End: 2025-02-13 | Stop reason: SDUPTHER

## 2025-02-13 RX ORDER — ACETAMINOPHEN 325 MG/1
650 TABLET ORAL EVERY 4 HOURS PRN
Status: DISCONTINUED | OUTPATIENT
Start: 2025-02-13 | End: 2025-02-13 | Stop reason: HOSPADM

## 2025-02-13 RX ADMIN — PROPOFOL 40 MCG/KG/MIN: 10 INJECTION, EMULSION INTRAVENOUS at 12:07

## 2025-02-13 RX ADMIN — DEXMEDETOMIDINE 8 MCG: 100 INJECTION, SOLUTION, CONCENTRATE INTRAVENOUS at 12:39

## 2025-02-13 RX ADMIN — SODIUM CHLORIDE: 9 INJECTION, SOLUTION INTRAVENOUS at 12:03

## 2025-02-13 RX ADMIN — MIDAZOLAM 2 MG: 1 INJECTION INTRAMUSCULAR; INTRAVENOUS at 12:06

## 2025-02-13 RX ADMIN — CEFAZOLIN 3000 MG: 3 INJECTION, POWDER, FOR SOLUTION INTRAVENOUS at 12:18

## 2025-02-13 RX ADMIN — PROPOFOL 40 MG: 10 INJECTION, EMULSION INTRAVENOUS at 12:38

## 2025-02-13 RX ADMIN — DEXMEDETOMIDINE 8 MCG: 100 INJECTION, SOLUTION, CONCENTRATE INTRAVENOUS at 12:09

## 2025-02-13 RX ADMIN — DEXMEDETOMIDINE 4 MCG: 100 INJECTION, SOLUTION, CONCENTRATE INTRAVENOUS at 12:35

## 2025-02-13 RX ADMIN — MIDAZOLAM 2 MG: 1 INJECTION INTRAMUSCULAR; INTRAVENOUS at 13:14

## 2025-02-13 ASSESSMENT — ENCOUNTER SYMPTOMS: SHORTNESS OF BREATH: 1

## 2025-02-13 NOTE — DISCHARGE INSTRUCTIONS
Patient Instructions Post-Pacemaker    1.  Resume eliquis 2/15/2025. Continue other medications as previously prescribed.     2. Do not shower until you follow-up in the office at your wound/device check. This is when your dressing will be removed.   May wash around    3.  Do not remove your Aquacel dressing at home, your dressing will be removed at your office visit in 1 week.     4.  Do not rub/massage the site.    5.  Do not drive for 3 days.  Do not lift or raise arm above head (right side)    6.  Do not raise affected arm above shoulder level & avoid pushing/pulling with affected arm.  You may use the sling as provided as a reminder to limit range of motion, but please do not completely immobilize your arm.  No lifting anything >5 lbs with affected arm.    7.  Call Dr. Hidalgo at [phone] if you experience any of the following symptoms:  - Redness at the PM site  - Swelling at or around the PM or in the right arm  - Pain around the PM  - Dizziness, lightheadedness, fainting spells  - Lack of energy  - Shortness of breath  - Rapid heart rate  - Chest or muscle twitches    8.  Follow-up with in clinic for wound & pacemaker function check on 2/20/25.        Follow-up with Dr. hidalgo on 6/12/25 at 940.     Future Appointments   Date Time Provider Department Center   2/20/2025  1:20 PM PACEMAKER3, BEN MATSON BS AMB   2/25/2025  1:30 PM Laura Villalba MD [facility] BS AMB   3/17/2025 11:00 AM Eduarda Candelaria APRN - NP [facility] DEP   6/12/2025  9:20 AM [email or web address] BS AMB   6/12/2025  9:40 AM Arben Hidalgo MD [facility] AMB         Arben Hidalgo M.D. formerly [facility]  Electrophysiology/Cardiology  [facility]  [address]                      [address]  [place] [address]                             [facility] [address] [phone]

## 2025-02-13 NOTE — ANESTHESIA PRE PROCEDURE
sodium chloride infusion   IntraVENous PRN Arben Olmos MD       • ceFAZolin (ANCEF) 3,000 mg in sodium chloride 0.9 % 100 mL (mini-bag)  3,000 mg IntraVENous On Call to OR Arben Olmos MD       • 0.9 % sodium chloride infusion   IntraVENous PRN Arben Olmos MD           Allergies:    Allergies   Allergen Reactions   • Silver Sulfadiazine Rash, Swelling and Other (See Comments)       Problem List:    Patient Active Problem List   Diagnosis Code   • Persistent atrial fibrillation with rapid ventricular response ([facility]) I48.19   • Sinus bradycardia on ECG R00.1   • Severe obesity with body mass index (BMI) of 35.0 to 39.9 with serious comorbidity E66.01   • S/P ablation of atrial fibrillation Z98.890, Z86.79   • Pacemaker Z95.0   • Sick sinus syndrome ([facility]) I49.5   • Atelectasis J98.11   • Cough R05.9   • Dizziness R42   • SVT (supraventricular tachycardia) ([facility]) I47.10   • Complete AV block due to AV lea ablation ([facility]) I97.190, I44.2   • Diastasis of rectus abdominis M62.08   • Anticoagulated Z79.01   • Chronic systolic (congestive) heart failure I50.22   • Secondary hypercoagulable state ([facility]) D68.69   • Primary hypertension I10   • Shortness of breath R06.02   • Primary pulmonary hypertension ([facility]) I27.0   • CHF (congestive heart failure) ([facility]) I50.9   • Cardiac pacemaker in situ Z95.0   • 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   • Controlled type 2 diabetes mellitus with hyperglycemia, without long-term current use of insulin ([facility]) E11.65       Past Medical History:        Diagnosis Date   • Atrial fibrillation ([facility])    • Hypertension    • Obesity    • Pacemaker    • Sleep apnea        Past Surgical History:        Procedure Laterality Date   • ABLATION, ATRIAL FIBRILLATION  07/25/2012        • CARDIAC PROCEDURE N/A 9/24/2024    Left and right heart cath / coronary angiography performed by Danuta Simon MD at [facility] CARDIAC CATH LAB   • CARDIAC SURGERY  08/17/2012        • ECHO INTRACARDIAC  07/25/2012        •

## 2025-02-13 NOTE — PROGRESS NOTES
Ambulated patient at bedside with a steady gait with standby assist, voided without difficulty. Returned to stretcher. Vital signs stable. Short of breath with exertion    Right chest Site is clean, dry, and intact. No bleeding, no hematoma.     Assisted patient in dressing    Educated on how to dress without raising right arm above head. Encouraged button-up shirts    Educated patient about their sedation precautions such as not driving, operating any machinery, or signing legal documents 72 hours post procedure.     Reviewed discharge instructions, including medications, and site care, using the teach back method. Answered all questions. Verbalized understanding.     Removed peripheral IV    1630- Dr. Olmos at bedside to review discharge instructions with Osito.     1635- Escorted to discharge area in a wheelchair with all of their belongings including clothing, bag, cell phone, discharge instructions, pacemaker booklet and temporary card    Brother Joseph present to take patient home. Reviewed discharge instructions with Joseph. Verbalized understanding.

## 2025-02-13 NOTE — PROGRESS NOTES
Cardiac Cath Lab Recovery Arrival Note:      Osito Ruff arrived to Cardiac Cath Lab, Recovery Area. Staff introduced to patient. Patient identifiers verified with NAME and DATE OF BIRTH. Procedure verified with patient. Consent forms reviewed and signed by patient or authorized representative and verified. Allergies verified.     Patient and family oriented to department. Patient and family informed of procedure and plan of care.     Questions answered with review. Patient prepped for procedure, per orders from physician, prior to arrival.    Patient on cardiac monitor, non-invasive blood pressure, SPO2 monitor. On room air. Patient is A&Ox 4. Patient reports shortness of breath.     Patient in stretcher, in low position, with side rails up, call bell within reach, patient instructed to call if assistance as needed.    Patient prep in: CCL Fast Track, [place] [address].   Patient family has phone # nicole Ruizer [phone]  Family in: waiting upstairs.

## 2025-02-13 NOTE — PROGRESS NOTES
Cardiac Cath Lab Procedure Area Arrival Note:    Osito Ruff arrived to Cardiac Cath Lab, Procedure Area. Patient identifiers verified with NAME and DATE OF BIRTH. Procedure verified with patient. Consent forms verified. Allergies verified. Patient informed of procedure and plan of care. Questions answered with review. Patient voiced understanding of procedure and plan of care.    Patient on cardiac monitor, non-invasive blood pressure, SPO2 monitor. On RA.  Patient status doing well without problems. Patient presents with poor hygiene; large amounts of crusted food in mustache and dirt underneath fingernails noticed upon arrival.  A sterile towel placed under chin and over lower face to keep dirt off of sterile field.  Usual cleaning and sterile draping of site followed this.     Patient is A&Ox 4. Patient reports lower back pain and SOB with movement. Patient's knees and head elevated for comfort and ease of breathing.       Patient medicated during procedure with orders obtained and verified by Dr. Olmos.    Refer to patients Cardiac Cath Lab PROCEDURE REPORT for vital signs, assessment, status, and response during procedure, printed at end of case. Printed report on chart or scanned into chart.

## 2025-02-13 NOTE — PROGRESS NOTES
TRANSFER - IN REPORT:    Verbal report received from TAYA Busby and CRNAon Osito Ruff  being received from EP lab for routine post-op. Report consisted of patient’s Situation, Background, Assessment and Recommendations(SBAR). Information from the following report(s) SBAR, Procedure Summary, Intake/Output, MAR, Accordion, Recent Results, Med Rec Status, and Cardiac Rhythm V-paced  was reviewed with the receiving clinician. Opportunity for questions and clarification was provided. Assessment completed upon patient’s arrival to Cardiac Cath Lab RECOVERY AREA and care assumed.       Cardiac Cath Lab Recovery Arrival Note:    Osito Ruff arrived to [facility] recovery area.  Patient procedure= Biventricular pacemaker implant. Patient on cardiac monitor, non-invasive blood pressure, SPO2 monitor. On r O2 @ 15 lpm via supernova.  IV  of NS on pump at 50 ml/hr. Patient status doing well without problems. Patient is A&Ox 3. Patient reports feeling tired.    PROCEDURE SITE CHECK:    Right chest Procedure site:without any bleeding or hematoma, no pain/discomfort reported at procedure site.     No change in patient status. Continue to monitor patient and status.       Sling in place, Ice pack placed on right chest

## 2025-02-13 NOTE — PROGRESS NOTES
EP/Cath LAB to Recovery Room Report    Procedure: Dual chamber PPI and LV Lead placement    MD: TICO Olmos MD    Verbal Report given to Recovery Nurse on patient being transferred to Recovery Room for routine post-op. Patient stable upon transfer to .  Pt had MAC sedation with Anesthesia team, who managed MAR, vitals, and airway. Vitals, mental status, MAR, procedural summary discussed with recovery RN.       Procedural Site:    Incision site made to right chest.   Site covered with steri strips and Aquacel Ag dressing.       Pacemaker Mode set to VVIR .

## 2025-02-13 NOTE — ANESTHESIA POSTPROCEDURE EVALUATION
Department of Anesthesiology  Postprocedure Note    Patient: Osito Ruff  MRN: [id number]  YOB: 1960  Date of evaluation: 2/13/2025    Procedure Summary       Date: 02/13/25 Room / Location: [facility] CATH LAB 3 / [facility] CARDIAC CATH LAB    Anesthesia Start: 1203 Anesthesia Stop: 1501    Procedures:       Biventricular pacemaker upgrade      Lv lead placement Diagnosis:       CHF (congestive heart failure) ([facility])      Cardiac pacemaker in situ      (CHF (congestive heart failure) (HCC) [I50.9])      (Cardiac pacemaker in situ [Z95.0])    Providers: Arben Olmos MD Responsible Provider: Matt Whittington MD    Anesthesia Type: MAC ASA Status: 3            Anesthesia Type: MAC    Isaac Phase I: Isaac Score: 8    Isaac Phase II:      Anesthesia Post Evaluation    Patient location during evaluation: PACU  Patient participation: complete - patient participated  Level of consciousness: awake  Airway patency: patent  Nausea & Vomiting: no nausea  Cardiovascular status: hemodynamically stable  Respiratory status: acceptable  Hydration status: stable  Pain management: adequate    No notable events documented.

## 2025-02-13 NOTE — INTERVAL H&P NOTE
Update History & Physical    The patient's History and Physical of January 14, 2025 was reviewed with the patient and I examined the patient. There was emergency in operating room and CT surgeon cannot be available yet for back up extraction of RV RA pacing lead to implant LV and new RV pacing lead.  I spoke to patient and his brother in person and they agree with implanting a new biventricular pacemaker in the right chest area using right subclavian vein as the laser extractions are uncertain each time he came. The surgical site was confirmed by the patient and me.     Plan: The risks, benefits, expected outcome, and alternative to the recommended procedure have been discussed with the patient. Patient understands and wants to proceed with the procedure.     Electronically signed by Arben Olmos MD on 2/13/2025 at 11:47 AM

## 2025-02-13 NOTE — PROCEDURES
PROCEDURE NOTE  Date: 2/13/2025   Name: Osito Ruff  YOB: 1960    Procedures    Cardiac Procedure Note   Patient: Osito Ruff  MRN: [id number]  SSN: [id number]   YOB: 1960 Age: 64 y.o.  Sex: male    Date of Procedure: 2/13/2025   Pre-procedure Diagnosis: non ischemic cardiomyopathy, atrial fibrillation/flutter with complete av block, LVEF 45%, NYHA 3, morbid obesity, dual chamber pacer in place with occluded left subclavian vein   Post-procedure Diagnosis: same  Procedure: Biventricular pacemaker Insertion  :  Dr. Arben Olmos MD    Assistant(s):  None  Anesthesia: Moderate Sedation by CRNA  Estimated Blood Loss: Less than 10 mL   Specimens Removed: None  Findings: right subclavian vein access  No RA lead due to persistent AFL AF  Morbid obesity: difficult with quality fluoroscopy and visualization   RV basal septal lead  LV lead difficult due to right sided and only one small posterolateral branch to implant Medtronic LV lead  No CT surgery back up available today for laser lead extraction   Complications: None   Implants: Medtronic biventricular pacemaker  Signed by:  Arben Olmos MD  2/13/2025  3:34 PM

## 2025-02-20 ENCOUNTER — PROCEDURE VISIT (OUTPATIENT)
Age: 65
End: 2025-02-20

## 2025-02-20 DIAGNOSIS — Z95.0 BIVENTRICULAR CARDIAC PACEMAKER IN SITU: Primary | ICD-10-CM

## 2025-02-20 NOTE — PROGRESS NOTES
Patient presents for wound check post-device implantation. The dressing was removed and the site was inspected. The site appeared to be well-healing without ecchymosis/tenderness/erythema. Denies pain, fevers, discharge.       Future Appointments   Date Time Provider Department Center   2/25/2025  1:30 PM Laura Villalba MD [facility] BS AMB   3/17/2025 11:00 AM Eduarda Candelaria APRN - NP [facility] DEP   6/12/2025  9:20 AM [email or web address] BS AMB   6/12/2025  9:40 AM Arben Olmos MD [facility]  AMB         Reviewed limb restrictions and site care, patient verbalized understanding  Continue follow up in device clinic as planned.

## 2025-02-24 ENCOUNTER — TELEPHONE (OUTPATIENT)
Age: 65
End: 2025-02-24

## 2025-02-24 NOTE — TELEPHONE ENCOUNTER
Spoke to patient and rescheduled appointment on 06/12/2025 with DR. Olmos. Provider will be out of the office. Patient confirmed understanding    Future Appointments   Date Time Provider Department Center   2/25/2025  1:30 PM Laura Villalba MD [facility] BS AMB   3/17/2025 11:00 AM [facility] - [facility]   6/24/2025  8:00 AM BEN LYONS BS AMB   6/24/2025  8:20 AM Arben Olmos MD [facility]

## 2025-02-25 ENCOUNTER — OFFICE VISIT (OUTPATIENT)
Age: 65
End: 2025-02-25
Payer: COMMERCIAL

## 2025-02-25 VITALS
HEIGHT: 75 IN | SYSTOLIC BLOOD PRESSURE: 112 MMHG | HEART RATE: 65 BPM | BODY MASS INDEX: 38.57 KG/M2 | RESPIRATION RATE: 22 BRPM | OXYGEN SATURATION: 94 % | TEMPERATURE: 97.6 F | DIASTOLIC BLOOD PRESSURE: 73 MMHG | WEIGHT: 310.2 LBS

## 2025-02-25 DIAGNOSIS — I48.19 PERSISTENT ATRIAL FIBRILLATION WITH RAPID VENTRICULAR RESPONSE (HCC): ICD-10-CM

## 2025-02-25 DIAGNOSIS — I44.2 COMPLETE AV BLOCK DUE TO AV NODAL ABLATION (HCC): ICD-10-CM

## 2025-02-25 DIAGNOSIS — E66.812 CLASS 2 SEVERE OBESITY DUE TO EXCESS CALORIES WITH SERIOUS COMORBIDITY AND BODY MASS INDEX (BMI) OF 38.0 TO 38.9 IN ADULT: Primary | ICD-10-CM

## 2025-02-25 DIAGNOSIS — I97.190 COMPLETE AV BLOCK DUE TO AV NODAL ABLATION (HCC): ICD-10-CM

## 2025-02-25 DIAGNOSIS — E66.01 CLASS 2 SEVERE OBESITY DUE TO EXCESS CALORIES WITH SERIOUS COMORBIDITY AND BODY MASS INDEX (BMI) OF 38.0 TO 38.9 IN ADULT: Primary | ICD-10-CM

## 2025-02-25 DIAGNOSIS — E11.65 CONTROLLED TYPE 2 DIABETES MELLITUS WITH HYPERGLYCEMIA, WITHOUT LONG-TERM CURRENT USE OF INSULIN (HCC): ICD-10-CM

## 2025-02-25 DIAGNOSIS — I10 PRIMARY HYPERTENSION: ICD-10-CM

## 2025-02-25 DIAGNOSIS — Z95.0 PACEMAKER: ICD-10-CM

## 2025-02-25 PROCEDURE — 3078F DIAST BP <80 MM HG: CPT | Performed by: FAMILY MEDICINE

## 2025-02-25 PROCEDURE — 99214 OFFICE O/P EST MOD 30 MIN: CPT | Performed by: FAMILY MEDICINE

## 2025-02-25 PROCEDURE — 3074F SYST BP LT 130 MM HG: CPT | Performed by: FAMILY MEDICINE

## 2025-02-25 ASSESSMENT — PATIENT HEALTH QUESTIONNAIRE - PHQ9
SUM OF ALL RESPONSES TO PHQ QUESTIONS 1-9: 0
1. LITTLE INTEREST OR PLEASURE IN DOING THINGS: NOT AT ALL
SUM OF ALL RESPONSES TO PHQ9 QUESTIONS 1 & 2: 0
2. FEELING DOWN, DEPRESSED OR HOPELESS: NOT AT ALL
SUM OF ALL RESPONSES TO PHQ QUESTIONS 1-9: 0

## 2025-02-25 NOTE — PROGRESS NOTES
[facility] Progress Note:   F/up Physician Visit    CC: Weight Management      Osito Ruff is a 64 y.o. male who is here for his  f/up physician visit for the / LCD Program.    He has cut down to one soda a week , one order of fries a week   He has reduced and limited the junk foods significantly    He was switched to zepbound last month  The wegovy was not working  He is down 13 lbs already since he switched to zepbound            2/25/2025     1:37 PM 2/25/2025     1:00 PM 2/13/2025    10:13 AM 1/14/2025     2:43 PM 1/14/2025     2:00 PM 1/14/2025     1:47 PM 1/14/2025     9:30 AM   Weight Metrics   Weight 310 lb 3.2 oz  318 lb 323 lb  323 lb 3.2 oz 320 lb   Neck (Inches)  20 in   20.25 in     Waist Measure Inches  57.25 in   58.5 in     Body Fat %     36.2 %     BMI (Calculated) 38.9 kg/m2  39.8 kg/m2 40.5 kg/m2  40.5 kg/m2 40.1 kg/m2          No data to display                   Current Outpatient Medications   Medication Sig Dispense Refill    cephALEXin (KEFLEX) 500 MG capsule Take 1 capsule by mouth 3 times daily 15 capsule 0    furosemide (LASIX) 40 MG tablet Take 1 tablet by mouth daily (Please schedule follow up with Dr. Abdul) 90 tablet 0    ENTRESTO 24-26 MG per tablet TAKE ONE TABLET BY MOUTH TWICE DAILY 60 tablet 3    empagliflozin (JARDIANCE) 10 MG tablet Take 1 tablet by mouth daily 90 tablet 1    potassium chloride (KLOR-CON M10) 10 MEQ extended release tablet TAKE 1 TABLET BY MOUTH EVERY DAY 90 tablet 3    apixaban (ELIQUIS) 5 MG TABS tablet Take 1 tablet by mouth 2 times daily 180 tablet 1    acetaminophen (TYLENOL) 500 MG tablet Take by mouth every 6 hours as needed      tirzepatide-weight management (ZEPBOUND) 5 MG/0.5ML SOAJ subCUTAneous auto-injector pen Inject 5 mg into the skin every 7 days 2 mL 1     No current facility-administered medications for this visit.          Participation   Did you attend clinic and class last week? no    Review of Systems  Since your

## 2025-02-25 NOTE — PROGRESS NOTES
Identified pt with two pt identifiers (name and ). Reviewed chart in preparation for visit and have obtained necessary documentation.    Osito Ruff is a 64 y.o. male  Chief Complaint   Patient presents with    Weight Management     1 month follow up     /73 (Site: Right Upper Arm, Position: Sitting, Cuff Size: Large Adult)   Pulse 65   Temp 97.6 °F (36.4 °C) (Oral)   Resp 22   Ht 1.905 m (6' 3\")   Wt (!) 140.7 kg (310 lb 3.2 oz)   SpO2 94%   BMI 38.77 kg/m²     1. Have you been to the ER, urgent care clinic since your last visit?  Hospitalized since your last visit?no    2. Have you seen or consulted any other health care providers outside of the [facility] since your last visit?  Include any pap smears or colon screening. No    Patient unable to remove socks today due to recent pacemaker replacement surgery

## 2025-03-10 NOTE — TELEPHONE ENCOUNTER
Requested Prescriptions     Signed Prescriptions Disp Refills    apixaban (ELIQUIS) 5 MG TABS tablet 180 tablet 0     Sig: Take 1 tablet by mouth 2 times daily (Please schedule follow up with Dr. Abdul)     Authorizing Provider: JOHNSON ABDUL     Ordering User: THEODORE BLAKE     Verbal order per Dr. Abdul.    Future Appointments   Date Time Provider Department Center   3/17/2025 11:00 AM Eduarda Candelaria APRN - NP [facility]   4/8/2025  1:30 PM [facility]   6/24/2025  8:00 AM BEN LYONS BS [facility]   6/24/2025  8:20 AM Arben Olmos MD [facility] AMB

## 2025-03-17 ENCOUNTER — OFFICE VISIT (OUTPATIENT)
Age: 65
End: 2025-03-17
Payer: COMMERCIAL

## 2025-03-17 VITALS
HEIGHT: 75 IN | WEIGHT: 315 LBS | SYSTOLIC BLOOD PRESSURE: 100 MMHG | RESPIRATION RATE: 21 BRPM | HEART RATE: 95 BPM | OXYGEN SATURATION: 98 % | DIASTOLIC BLOOD PRESSURE: 60 MMHG | TEMPERATURE: 98 F | BODY MASS INDEX: 39.17 KG/M2

## 2025-03-17 DIAGNOSIS — R06.09 DOE (DYSPNEA ON EXERTION): Primary | ICD-10-CM

## 2025-03-17 DIAGNOSIS — R42 DIZZINESS: ICD-10-CM

## 2025-03-17 DIAGNOSIS — E11.65 CONTROLLED TYPE 2 DIABETES MELLITUS WITH HYPERGLYCEMIA, WITHOUT LONG-TERM CURRENT USE OF INSULIN (HCC): ICD-10-CM

## 2025-03-17 DIAGNOSIS — M25.559 HIP PAIN, UNSPECIFIED LATERALITY: ICD-10-CM

## 2025-03-17 DIAGNOSIS — L97.529 ULCER OF LEFT FOOT, UNSPECIFIED ULCER STAGE (HCC): ICD-10-CM

## 2025-03-17 DIAGNOSIS — M79.673 FOOT ARCH PAIN, UNSPECIFIED LATERALITY: ICD-10-CM

## 2025-03-17 PROCEDURE — 3078F DIAST BP <80 MM HG: CPT | Performed by: INTERNAL MEDICINE

## 2025-03-17 PROCEDURE — 3074F SYST BP LT 130 MM HG: CPT | Performed by: INTERNAL MEDICINE

## 2025-03-17 PROCEDURE — 99214 OFFICE O/P EST MOD 30 MIN: CPT | Performed by: INTERNAL MEDICINE

## 2025-03-17 RX ORDER — POTASSIUM CHLORIDE 750 MG/1
10 TABLET, EXTENDED RELEASE ORAL DAILY
COMMUNITY
Start: 2025-03-09 | End: 2025-03-17 | Stop reason: CLARIF

## 2025-03-17 ASSESSMENT — ENCOUNTER SYMPTOMS
GASTROINTESTINAL NEGATIVE: 1
SHORTNESS OF BREATH: 1

## 2025-03-17 NOTE — PROGRESS NOTES
Subjective    Osito Ruff is a 64 y.o. male who presents today for the following:  Chief Complaint   Patient presents with    Follow-up    Diabetes    Atrial Fibrillation    Dizziness       History of Present Illness  The patient presents for a follow-up visit. He is accompanied by his brother.    He has been experiencing recurrent foot infections, with the most recent episode occurring 2 months ago. Despite the use of doctor. Comfort orthopedic shoes, he continues to experience discomfort due to inadequate ankle support. His mobility is significantly reduced due to the need to minimize weight-bearing on the affected foot. He has been under the care of a podiatrist, who recently treated him for an abscess on his left foot with a combination of oral antibiotics and a topical cream. He continues to apply the cream daily after soaking his feet in Epsom salt. The podiatrist's report indicates a grade 3 ulcer, peripheral neuropathy, and pes cavus. A wound culture was performed, and he was started on doxycycline and gentamicin topical ointment. An x-ray was ordered to rule out osteomyelitis. He had a follow-up visit with the podiatrist last Wednesday to monitor the healing process of the wound. He has been advised to limit weight-bearing on the affected foot as much as possible. He has been using orthotics and custom-molded boots, but these have not been effective in providing relief. He has been applying for Social Security disability due to his inability to work.    He reports persistent shortness of breath upon exertion, but no chest pain. He underwent pacemaker implantation in 02/2025 and has a scheduled follow-up with cardiology on 06/12/2025. He reports no improvement in his dizziness or shortness of breath since the pacemaker implantation. He also reports persistent lightheadedness.    He has a history of bursitis in his hip, which was previously managed with treatment for an infection in his other foot. However,

## 2025-03-17 NOTE — PROGRESS NOTES
Chief Complaint   Patient presents with    Follow-up    Diabetes    Atrial Fibrillation     \"Have you been to the ER, urgent care clinic since your last visit?  Hospitalized since your last visit?\"    2/13/2025 (7 hours)  [facility]  Stroke/Acute MI    “Have you seen or consulted any other health care providers outside our system since your last visit?”    NO    “Have you had a colorectal cancer screening such as a colonoscopy/FIT/Cologuard?    NO    No colonoscopy on file  No cologuard on file  No FIT/FOBT on file   No flexible sigmoidoscopy on file     “Have you had a diabetic eye exam?”    NO     No diabetic eye exam on file

## 2025-03-18 ENCOUNTER — RESULTS FOLLOW-UP (OUTPATIENT)
Age: 65
End: 2025-03-18

## 2025-03-18 LAB
ALBUMIN SERPL-MCNC: 4.2 G/DL (ref 3.9–4.9)
ALBUMIN/CREAT UR: 35 MG/G CREAT (ref 0–29)
ALP SERPL-CCNC: 116 IU/L (ref 44–121)
ALT SERPL-CCNC: 22 IU/L (ref 0–44)
AST SERPL-CCNC: 20 IU/L (ref 0–40)
BILIRUB SERPL-MCNC: 0.3 MG/DL (ref 0–1.2)
BUN SERPL-MCNC: 12 MG/DL (ref 8–27)
BUN/CREAT SERPL: 15 (ref 10–24)
CALCIUM SERPL-MCNC: 9.1 MG/DL (ref 8.6–10.2)
CHLORIDE SERPL-SCNC: 103 MMOL/L (ref 96–106)
CHOLEST SERPL-MCNC: 124 MG/DL (ref 100–199)
CO2 SERPL-SCNC: 21 MMOL/L (ref 20–29)
CREAT SERPL-MCNC: 0.82 MG/DL (ref 0.76–1.27)
CREAT UR-MCNC: 34.5 MG/DL
EGFRCR SERPLBLD CKD-EPI 2021: 99 ML/MIN/1.73
GLOBULIN SER CALC-MCNC: 2.5 G/DL (ref 1.5–4.5)
GLUCOSE SERPL-MCNC: 77 MG/DL (ref 70–99)
HBA1C MFR BLD: 6.3 % (ref 4.8–5.6)
HDLC SERPL-MCNC: 44 MG/DL
IMP & REVIEW OF LAB RESULTS: NORMAL
LDLC SERPL CALC-MCNC: 56 MG/DL (ref 0–99)
Lab: NORMAL
MICROALBUMIN UR-MCNC: 12.1 UG/ML
POTASSIUM SERPL-SCNC: 4.7 MMOL/L (ref 3.5–5.2)
PROT SERPL-MCNC: 6.7 G/DL (ref 6–8.5)
SODIUM SERPL-SCNC: 142 MMOL/L (ref 134–144)
TRIGL SERPL-MCNC: 134 MG/DL (ref 0–149)
VLDLC SERPL CALC-MCNC: 24 MG/DL (ref 5–40)

## 2025-03-31 DIAGNOSIS — I50.9 CONGESTIVE HEART FAILURE, UNSPECIFIED HF CHRONICITY, UNSPECIFIED HEART FAILURE TYPE (HCC): ICD-10-CM

## 2025-03-31 NOTE — TELEPHONE ENCOUNTER
Requested Prescriptions     Signed Prescriptions Disp Refills    empagliflozin (JARDIANCE) 10 MG tablet 90 tablet 0     Sig: Take 1 tablet by mouth daily (Please schedule follow up with Dr. Abdul)     Authorizing Provider: JOHNSON ABDUL     Ordering User: THEODORE BLAKE     Verbal order per Dr. Abdul.

## 2025-04-06 PROCEDURE — 93294 REM INTERROG EVL PM/LDLS PM: CPT | Performed by: INTERNAL MEDICINE

## 2025-04-06 PROCEDURE — 93296 REM INTERROG EVL PM/IDS: CPT | Performed by: INTERNAL MEDICINE

## 2025-04-08 ENCOUNTER — OFFICE VISIT (OUTPATIENT)
Age: 65
End: 2025-04-08
Payer: COMMERCIAL

## 2025-04-08 VITALS
BODY MASS INDEX: 39.17 KG/M2 | TEMPERATURE: 97.9 F | RESPIRATION RATE: 22 BRPM | HEIGHT: 75 IN | WEIGHT: 315 LBS | DIASTOLIC BLOOD PRESSURE: 77 MMHG | HEART RATE: 75 BPM | SYSTOLIC BLOOD PRESSURE: 116 MMHG | OXYGEN SATURATION: 96 %

## 2025-04-08 DIAGNOSIS — I48.19 PERSISTENT ATRIAL FIBRILLATION WITH RAPID VENTRICULAR RESPONSE (HCC): ICD-10-CM

## 2025-04-08 DIAGNOSIS — I10 PRIMARY HYPERTENSION: ICD-10-CM

## 2025-04-08 DIAGNOSIS — E66.813 CLASS 3 SEVERE OBESITY DUE TO EXCESS CALORIES WITH SERIOUS COMORBIDITY AND BODY MASS INDEX (BMI) OF 40.0 TO 44.9 IN ADULT: Primary | ICD-10-CM

## 2025-04-08 DIAGNOSIS — E11.65 CONTROLLED TYPE 2 DIABETES MELLITUS WITH HYPERGLYCEMIA, WITHOUT LONG-TERM CURRENT USE OF INSULIN (HCC): ICD-10-CM

## 2025-04-08 PROCEDURE — 99214 OFFICE O/P EST MOD 30 MIN: CPT | Performed by: FAMILY MEDICINE

## 2025-04-08 PROCEDURE — 3078F DIAST BP <80 MM HG: CPT | Performed by: FAMILY MEDICINE

## 2025-04-08 PROCEDURE — 3044F HG A1C LEVEL LT 7.0%: CPT | Performed by: FAMILY MEDICINE

## 2025-04-08 PROCEDURE — 3074F SYST BP LT 130 MM HG: CPT | Performed by: FAMILY MEDICINE

## 2025-04-08 RX ORDER — TIRZEPATIDE 7.5 MG/.5ML
7.5 INJECTION, SOLUTION SUBCUTANEOUS WEEKLY
Qty: 2 ML | Refills: 0 | Status: SHIPPED | OUTPATIENT
Start: 2025-04-08

## 2025-04-08 ASSESSMENT — PATIENT HEALTH QUESTIONNAIRE - PHQ9
2. FEELING DOWN, DEPRESSED OR HOPELESS: NOT AT ALL
1. LITTLE INTEREST OR PLEASURE IN DOING THINGS: NOT AT ALL
SUM OF ALL RESPONSES TO PHQ QUESTIONS 1-9: 0

## 2025-04-08 NOTE — PROGRESS NOTES
Identified pt with two pt identifiers (name and ). Reviewed chart in preparation for visit and have obtained necessary documentation.    Osito Ruff is a 64 y.o. male  Chief Complaint   Patient presents with    Weight Management     1 month follow up     /77 (BP Site: Right Upper Arm, Patient Position: Sitting, BP Cuff Size: Large Adult)   Pulse 75   Temp 97.9 °F (36.6 °C) (Oral)   Resp 22   Ht 1.905 m (6' 3\")   Wt (!) 148.7 kg (327 lb 14.4 oz)   SpO2 96%   BMI 40.98 kg/m²     1. Have you been to the ER, urgent care clinic since your last visit?  Hospitalized since your last visit?no    2. Have you seen or consulted any other health care providers outside of the [facility] since your last visit?  Include any pap smears or colon screening. No    BMI -     Patient stated that he cannot take socks off today

## 2025-04-08 NOTE — PROGRESS NOTES
[facility] Progress Note:   F/up Physician Visit    CC: Weight Management      Osito Ruff is a 64 y.o. male who is here for his  f/up physician visit for the  [facility].    He has taken one box of zepbound 5  mg but he has regained weight  He has swelling in his feet            4/8/2025     1:31 PM 4/8/2025     1:00 PM 3/17/2025    10:50 AM 2/25/2025     1:37 PM 2/25/2025     1:00 PM 2/13/2025    10:13 AM 1/14/2025     2:43 PM   Weight Metrics   Weight 327 lb 14.4 oz  325 lb 310 lb 3.2 oz  318 lb 323 lb   Neck (Inches)  19.5 in   20 in     Waist Measure Inches     57.25 in     BMI (Calculated) 41.1 kg/m2  40.7 kg/m2 38.9 kg/m2  39.8 kg/m2 40.5 kg/m2          No data to display                   Current Outpatient Medications   Medication Sig Dispense Refill    Tirzepatide-Weight Management (ZEPBOUND) 7.5 MG/0.5ML SOLN subCUTAneous injection (VIAL) Inject 0.5 mLs into the skin once a week 2 mL 0    empagliflozin (JARDIANCE) 10 MG tablet Take 1 tablet by mouth daily (Please schedule follow up with Dr. Abdul) 90 tablet 0    apixaban (ELIQUIS) 5 MG TABS tablet Take 1 tablet by mouth 2 times daily (Please schedule follow up with Dr. Abdul) 180 tablet 0    furosemide (LASIX) 40 MG tablet Take 1 tablet by mouth daily (Please schedule follow up with Dr. Abdul) 90 tablet 0    ENTRESTO 24-26 MG per tablet TAKE ONE TABLET BY MOUTH TWICE DAILY 60 tablet 3    potassium chloride (KLOR-CON M10) 10 MEQ extended release tablet TAKE 1 TABLET BY MOUTH EVERY DAY 90 tablet 3    acetaminophen (TYLENOL) 500 MG tablet Take by mouth every 6 hours as needed       No current facility-administered medications for this visit.          Participation   Did you attend clinic and class last week? no    Review of Systems  Since your last visit, have you experienced any complications? no  If yes, please list:       Are you taking an appetite suppressant? yes  If so, is there any Chest Pain, Palpitations or

## 2025-04-16 RX ORDER — SACUBITRIL AND VALSARTAN 24; 26 MG/1; MG/1
1 TABLET, FILM COATED ORAL 2 TIMES DAILY
Qty: 10 TABLET | Refills: 0 | Status: SHIPPED | OUTPATIENT
Start: 2025-04-16

## 2025-04-16 NOTE — TELEPHONE ENCOUNTER
Requested Prescriptions     Signed Prescriptions Disp Refills    sacubitril-valsartan (ENTRESTO) 24-26 MG per tablet 10 tablet 0     Sig: Take 1 tablet by mouth 2 times daily Please schedule follow up with Dr. Abdul)     Authorizing Provider: JOHNSON ABDUL     Ordering User: THEODORE BLAKE       Verbal order per Dr. Abdul.

## 2025-05-14 RX ORDER — SACUBITRIL AND VALSARTAN 24; 26 MG/1; MG/1
TABLET, FILM COATED ORAL
Qty: 60 TABLET | OUTPATIENT
Start: 2025-05-14

## 2025-05-19 DIAGNOSIS — E66.813 CLASS 3 SEVERE OBESITY DUE TO EXCESS CALORIES WITH SERIOUS COMORBIDITY AND BODY MASS INDEX (BMI) OF 40.0 TO 44.9 IN ADULT (HCC): ICD-10-CM

## 2025-05-19 RX ORDER — TIRZEPATIDE 7.5 MG/.5ML
7.5 INJECTION, SOLUTION SUBCUTANEOUS WEEKLY
Qty: 2 ML | Refills: 0 | Status: SHIPPED | OUTPATIENT
Start: 2025-05-19

## 2025-05-19 NOTE — PROGRESS NOTES
Last Rx on 4/8 for 2 ml with 0 RF (7.5 mg dose)    Last seen by Dr. Villalba on 4/8    Next appointment scheduled for 5/29   Fax

## 2025-05-27 ENCOUNTER — TELEPHONE (OUTPATIENT)
Age: 65
End: 2025-05-27

## 2025-05-27 NOTE — TELEPHONE ENCOUNTER
Patient left voicemail requesting to  cancel appointment on 5/29/25; contacted patient to reschedule appointment ; patient stated he will call back to reschedule

## 2025-06-05 RX ORDER — APIXABAN 5 MG/1
TABLET, FILM COATED ORAL
Qty: 180 TABLET | Refills: 0 | Status: SHIPPED | OUTPATIENT
Start: 2025-06-05

## 2025-06-05 RX ORDER — POTASSIUM CHLORIDE 750 MG/1
10 TABLET, EXTENDED RELEASE ORAL DAILY
Qty: 90 TABLET | Refills: 0 | Status: SHIPPED | OUTPATIENT
Start: 2025-06-05

## 2025-06-05 NOTE — TELEPHONE ENCOUNTER
Cardiologist: Dr. Abdul    Future Appointments   Date Time Provider Department Center   6/12/2025  1:15 PM Eduarda Candelaria APRN - NP [facility]   6/24/2025  8:00 AM [email or web address], [facility]   6/24/2025  8:20 AM Pietro, Ivis, APRN - CNP [facility]       Requested Prescriptions     Signed Prescriptions Disp Refills    ELIQUIS 5 MG TABS tablet 180 tablet 0     Sig: TAKE 1 TABLET BY MOUTH 2 TIMES A DAY. *PLEASE SCHEDULE FOLLOW UP WITH DR. ABDUL     Authorizing Provider: JOHNSON ABDUL     Ordering User: SAMMY AVILA    potassium chloride (KLOR-CON) 10 MEQ extended release tablet 90 tablet 0     Sig: Take 1 tablet by mouth daily **FOLLOW UP WITH DR ABDUL FOR FURTHER REFILLS**     Authorizing Provider: JOHNSON ABDUL     Ordering User: SAMMY AVILA         Refills VO per Dr. Abdul.

## 2025-06-09 NOTE — PROGRESS NOTES
0730 Bedside shift change report given to Shakira Baxter RN (oncoming nurse) by Eufemia Green RN (offgoing nurse). Report included the following information SBAR, Kardex, Procedure Summary, Intake/Output, MAR, Recent Results and Med Rec Status. [id number] I have reviewed discharge instructions with the patient. The patient verbalized understanding. Spoke with mother on the phone; encouraged to call if there were any questions regarding discharge instructions. Problem: Falls - Risk of  Goal: *Absence of falls  Outcome: Progressing Towards Goal  Pt has remained free of falls since admission. He is up ad brynn. Goal: *Knowledge of fall prevention  Outcome: Progressing Towards Goal  Pt demonstrates appropriate safety awareness. Problem: Cath Lab Procedures: Discharge Outcomes  Goal: *Stable cardiac rhythm  Outcome: Progressing Towards Goal  Pt is paced on the monitor; underlying NSR. Goal: *Hemodynamically stable  Outcome: Progressing Towards Goal  Patient Vitals for the past 12 hrs:    Temp Pulse Resp BP SpO2   01/11/17 0821 97.6 °F (36.4 °C) 71 16 131/68 96 %   01/11/17 0419 97.8 °F (36.6 °C) 68 18 122/90 98 %   01/10/17 2342 98 °F (36.7 °C) 90 18 118/65 97 %         Goal: *Optimal pain control at patients stated goal  Outcome: Progressing Towards Goal  Pt has no c/o pain. Goal: *Pulses palpable, skin color within defined limits, skin temperature warm  Outcome: Progressing Towards Goal  Pulses palpable below cath sites. Suad

## 2025-06-12 ENCOUNTER — OFFICE VISIT (OUTPATIENT)
Age: 65
End: 2025-06-12
Payer: COMMERCIAL

## 2025-06-12 VITALS
OXYGEN SATURATION: 95 % | WEIGHT: 315 LBS | DIASTOLIC BLOOD PRESSURE: 80 MMHG | HEIGHT: 75 IN | SYSTOLIC BLOOD PRESSURE: 130 MMHG | HEART RATE: 91 BPM | BODY MASS INDEX: 39.17 KG/M2 | RESPIRATION RATE: 18 BRPM

## 2025-06-12 DIAGNOSIS — E11.65 CONTROLLED TYPE 2 DIABETES MELLITUS WITH HYPERGLYCEMIA, WITHOUT LONG-TERM CURRENT USE OF INSULIN (HCC): ICD-10-CM

## 2025-06-12 DIAGNOSIS — I50.9 CONGESTIVE HEART FAILURE, UNSPECIFIED HF CHRONICITY, UNSPECIFIED HEART FAILURE TYPE (HCC): ICD-10-CM

## 2025-06-12 DIAGNOSIS — L97.529 ULCER OF LEFT FOOT, UNSPECIFIED ULCER STAGE (HCC): ICD-10-CM

## 2025-06-12 DIAGNOSIS — E66.01 MORBID OBESITY (HCC): ICD-10-CM

## 2025-06-12 DIAGNOSIS — M79.673 FOOT ARCH PAIN, UNSPECIFIED LATERALITY: ICD-10-CM

## 2025-06-12 DIAGNOSIS — R23.9 RECENT SKIN CHANGES: Primary | ICD-10-CM

## 2025-06-12 PROCEDURE — 99214 OFFICE O/P EST MOD 30 MIN: CPT | Performed by: INTERNAL MEDICINE

## 2025-06-12 PROCEDURE — 3044F HG A1C LEVEL LT 7.0%: CPT | Performed by: INTERNAL MEDICINE

## 2025-06-12 PROCEDURE — 3079F DIAST BP 80-89 MM HG: CPT | Performed by: INTERNAL MEDICINE

## 2025-06-12 PROCEDURE — 3075F SYST BP GE 130 - 139MM HG: CPT | Performed by: INTERNAL MEDICINE

## 2025-06-12 RX ORDER — GENTAMICIN SULFATE 1 MG/G
CREAM TOPICAL
COMMUNITY
Start: 2025-04-23

## 2025-06-12 RX ORDER — DOXYCYCLINE 100 MG/1
TABLET ORAL
COMMUNITY
Start: 2025-05-27 | End: 2025-06-12

## 2025-06-12 ASSESSMENT — ENCOUNTER SYMPTOMS: RESPIRATORY NEGATIVE: 1

## 2025-06-12 NOTE — PROGRESS NOTES
prescribed for this condition.    6. Medication management.  - The patient is currently on Jardiance 10 mg and Eliquis.  - Medication effectiveness noted.  - Refill for Jardiance has been sent to the pharmacy.  - No new medications or therapies prescribed for this condition.    Follow-up  - The patient will follow up in 4 months.    Osito was seen today for follow-up.    Diagnoses and all orders for this visit:    Recent skin changes  -     AFL - Kristie Angulo MD, Dermatology, [facility])    Foot arch pain, unspecified laterality    Ulcer of left foot, unspecified ulcer stage ([facility])    Morbid obesity ([facility])    Controlled type 2 diabetes mellitus with hyperglycemia, without long-term current use of insulin ([facility])    Congestive heart failure, unspecified HF chronicity, unspecified heart failure type ([facility])  -     empagliflozin (JARDIANCE) 10 MG tablet; Take 1 tablet by mouth daily    Reviewed with patient medication side effects in detail   I answered all patient questions and concerns  Labs and testing done and/or upcoming labs/test were reviewed and discussed   Reviewed and discussed daily activity, exercise and diet        Return in about 4 months (around 10/12/2025) for follow up if symptoms persist, follow up for routine visit or before if needed.     EULALIO Larose - NP      The patient (or guardian, if applicable) and other individuals in attendance with the patient were advised that Artificial Intelligence will be utilized during this visit to record and process the conversation to generate a clinical note. The patient (or guardian, if applicable) and other individuals in attendance at the appointment consented to the use of AI, including the recording.

## 2025-06-23 NOTE — PROGRESS NOTES
Cardiac Electrophysiology Office Follow-up    NAME:  Osito Ruff   :  1960  MRM:  [id number]    Date:  2025     Primary Cardiologist: Chantell    Assessment and Plan:     1. Longstanding persistent atrial fibrillation (HCC)  -     EKG 12 Lead  -     ECHO COMPLETE ADULT (TTE) W OR WO CONTR- Clinic Performed; Future  2. Chronic systolic (congestive) heart failure (HCC)  -     EKG 12 Lead  -     ECHO COMPLETE ADULT (TTE) W OR WO CONTR- Clinic Performed; Future  3. Complete AV block due to AV lea ablation (HCC)  -     EKG 12 Lead  -     ECHO COMPLETE ADULT (TTE) W OR WO CONTR- Clinic Performed; Future  4. Biventricular cardiac pacemaker in situ  -     EKG 12 Lead  -     ECHO COMPLETE ADULT (TTE) W OR WO CONTR- Clinic Performed; Future  5. Primary hypertension  -     EKG 12 Lead  -     ECHO COMPLETE ADULT (TTE) W OR WO CONTR- Clinic Performed; Future  6. Anticoagulated  -     EKG 12 Lead  -     ECHO COMPLETE ADULT (TTE) W OR WO CONTR- Clinic Performed; Future       Medtronic biventricular pacemaker (DOI 2025 LV/RV/generator, right sided by Dr. Olmos):  - Medtronic dual chamber pacemaker (gen change 2023, leads 2012) on the left side.  - Device check today shows proper lead & generator function. Generator longevity estimated 11.4 years. RV 99.8% CRT 99.8%. No sustained VT/VF.  Iterative programming was performed to test the leads sensing and threshold parameters without any permanent changes.  - EKG today V paced 73 bpm with  ms, previously 155 ms prior to upgrade.  - Continue remote device transmissions every 3 months  - Follow-up in EP clinic in 1 year or sooner with any concerns    Chronic systolic heart failure:  - Echo 2024 with EF 45-50%, underwent CRT upgrade given high RV pacing  - Follow-up with Dr. Abdul with repeat echo  - Continue Jardiance, Lasix, Entresto    Persistent AF:   - Permanently rate controlled s/p AV node ablation  - Cannot tolerate Metoprolol      NSVT:

## 2025-06-24 ENCOUNTER — PROCEDURE VISIT (OUTPATIENT)
Age: 65
End: 2025-06-24

## 2025-06-24 ENCOUNTER — OFFICE VISIT (OUTPATIENT)
Age: 65
End: 2025-06-24
Payer: COMMERCIAL

## 2025-06-24 VITALS
OXYGEN SATURATION: 95 % | WEIGHT: 315 LBS | DIASTOLIC BLOOD PRESSURE: 72 MMHG | HEIGHT: 75 IN | HEART RATE: 77 BPM | BODY MASS INDEX: 39.17 KG/M2 | SYSTOLIC BLOOD PRESSURE: 130 MMHG

## 2025-06-24 DIAGNOSIS — I50.22 CHRONIC SYSTOLIC (CONGESTIVE) HEART FAILURE (HCC): ICD-10-CM

## 2025-06-24 DIAGNOSIS — I44.2 COMPLETE AV BLOCK DUE TO AV NODAL ABLATION (HCC): ICD-10-CM

## 2025-06-24 DIAGNOSIS — I10 PRIMARY HYPERTENSION: ICD-10-CM

## 2025-06-24 DIAGNOSIS — I97.190 COMPLETE AV BLOCK DUE TO AV NODAL ABLATION (HCC): ICD-10-CM

## 2025-06-24 DIAGNOSIS — I48.11 LONGSTANDING PERSISTENT ATRIAL FIBRILLATION (HCC): Primary | ICD-10-CM

## 2025-06-24 DIAGNOSIS — Z95.0 BIVENTRICULAR CARDIAC PACEMAKER IN SITU: Primary | ICD-10-CM

## 2025-06-24 DIAGNOSIS — Z79.01 ANTICOAGULATED: ICD-10-CM

## 2025-06-24 DIAGNOSIS — Z95.0 BIVENTRICULAR CARDIAC PACEMAKER IN SITU: ICD-10-CM

## 2025-06-24 PROCEDURE — 3078F DIAST BP <80 MM HG: CPT

## 2025-06-24 PROCEDURE — 99214 OFFICE O/P EST MOD 30 MIN: CPT

## 2025-06-24 PROCEDURE — 3075F SYST BP GE 130 - 139MM HG: CPT

## 2025-06-24 PROCEDURE — 93000 ELECTROCARDIOGRAM COMPLETE: CPT

## 2025-06-24 RX ORDER — CEPHALEXIN 500 MG/1
500 CAPSULE ORAL 3 TIMES DAILY
COMMUNITY

## 2025-06-24 RX ORDER — SACUBITRIL AND VALSARTAN 24; 26 MG/1; MG/1
1 TABLET, FILM COATED ORAL 2 TIMES DAILY
Qty: 60 TABLET | Refills: 0 | Status: SHIPPED | OUTPATIENT
Start: 2025-06-24

## 2025-06-24 ASSESSMENT — PATIENT HEALTH QUESTIONNAIRE - PHQ9
1. LITTLE INTEREST OR PLEASURE IN DOING THINGS: NOT AT ALL
SUM OF ALL RESPONSES TO PHQ QUESTIONS 1-9: 0
2. FEELING DOWN, DEPRESSED OR HOPELESS: NOT AT ALL
SUM OF ALL RESPONSES TO PHQ QUESTIONS 1-9: 0

## 2025-06-24 NOTE — PROGRESS NOTES
1. Have you been to the ER, urgent care clinic since your last visit?  Hospitalized since your last visit?  No    2. Have you seen or consulted any other health care providers outside of the [facility] since your last visit?  Include any pap smears or colon screening.   [facility]

## 2025-07-10 ENCOUNTER — TELEPHONE (OUTPATIENT)
Age: 65
End: 2025-07-10

## 2025-07-10 NOTE — TELEPHONE ENCOUNTER
Verified patient with two types of identifiers.   Dental office calling as patient is there for dental cleaning and they would like to use Cavitron, which uses electrical impulses. Per MDT Brayden, no risk of interference. Advised dental office OK to proceed.

## 2025-07-20 DIAGNOSIS — I50.22 CHRONIC SYSTOLIC (CONGESTIVE) HEART FAILURE (HCC): Primary | ICD-10-CM

## 2025-07-21 RX ORDER — SACUBITRIL AND VALSARTAN 24; 26 MG/1; MG/1
1 TABLET, FILM COATED ORAL 2 TIMES DAILY
Qty: 180 TABLET | Refills: 1 | Status: SHIPPED | OUTPATIENT
Start: 2025-07-21

## 2025-07-21 NOTE — TELEPHONE ENCOUNTER
Requested Prescriptions     Signed Prescriptions Disp Refills    sacubitril-valsartan (ENTRESTO) 24-26 MG per tablet 180 tablet 1     Sig: Take 1 tablet by mouth 2 times daily (Please keep follow up with Dr. Abdul)     Authorizing Provider: ED ABDUL     Ordering User: THEODORE BLAKE     Verbal order per Dr. Abdul.    Future Appointments   Date Time Provider Department Center   10/13/2025  1:00 PM Eduarda Candelaria APRN - NP [facility]   10/22/2025  2:00 PM Ed Abdul MD [facility]   6/25/2026  8:20 AM [id number]   6/25/2026  8:40 AM Javier Quintanilla MD [facility]

## 2025-09-01 DIAGNOSIS — Z79.899 ON POTASSIUM SPARING DIURETIC THERAPY: Primary | ICD-10-CM

## 2025-09-02 RX ORDER — POTASSIUM CHLORIDE 750 MG/1
10 TABLET, EXTENDED RELEASE ORAL DAILY
Qty: 90 TABLET | Refills: 0 | Status: SHIPPED | OUTPATIENT
Start: 2025-09-02

## (undated) DEVICE — RADIFOCUS GLIDEWIRE: Brand: GLIDEWIRE

## (undated) DEVICE — 3M™ IOBAN™ 2 ANTIMICROBIAL INCISE DRAPE 6650EZ: Brand: IOBAN™ 2

## (undated) DEVICE — CATHETER EP 6FR L92CM 2-5-2MM SPC TIP 1MM 4 ELECTRD D CRV

## (undated) DEVICE — DRESSING ANTIMIC FOAM OPTIFOAM POSTOP ADH 4 X 6 IN

## (undated) DEVICE — CATHETER ART THERMODILUTION 6 FRX110 CM 4 LUMEN SWAN

## (undated) DEVICE — GUIDE WIRE WITH HYDROPHILIC COATING: Brand: ACUITY WHISPER VIEW™

## (undated) DEVICE — PROVE COVER: Brand: UNBRANDED

## (undated) DEVICE — PINNACLE INTRODUCER SHEATH: Brand: PINNACLE

## (undated) DEVICE — FLOW DIRECTED PACING CATHETER
Type: IMPLANTABLE DEVICE | Status: NON-FUNCTIONAL
Brand: PACEL™
Removed: 2023-01-04

## (undated) DEVICE — SUTURE V-LOC 180 SZ 2-0 L9IN ABSRB VLT GS-21 L37MM 1/2 CIR VLOCM0345

## (undated) DEVICE — CATHETER DIAG 5FR L75CM ID0.046IN HK CRV VEIN SEL UNIQUE

## (undated) DEVICE — KIT HND CTRL AT-XL65

## (undated) DEVICE — KIT MED IMAG CNTRST AGNT W/ IOPAMIDOL REUSE

## (undated) DEVICE — ELECTRODE PT RET AD L9FT HI MOIST COND ADH HYDRGEL CORDED

## (undated) DEVICE — HEART CATH-MRMC: Brand: MEDLINE INDUSTRIES, INC.

## (undated) DEVICE — ANGIOGRAPHY KIT

## (undated) DEVICE — PLASMABLADE PS210-030S 3.0S LOCK: Brand: PLASMABLADE™

## (undated) DEVICE — SPLINT WR VELC FOAM NEUT POS DISP FOR RAD ART ACC SFT STRP

## (undated) DEVICE — CATHETER DIAG 5FR L100CM LUMN ID0.047IN JL3.5 CRV 0 SIDE H

## (undated) DEVICE — GLIDESHEATH SLENDER STAINLESS STEEL KIT: Brand: GLIDESHEATH SLENDER

## (undated) DEVICE — DEVICE CLSR 5FR BIOABSRB FULL INTEGR RAP HEMSTAS FOR FEM

## (undated) DEVICE — STRIP SKIN CLSR W0.25XL4IN WHT SPUNBOUND FBR NYL HI ADH

## (undated) DEVICE — REM POLYHESIVE ADULT PATIENT RETURN ELECTRODE: Brand: VALLEYLAB

## (undated) DEVICE — KIT HND CTRL 3 W STPCOCK ROT END 54IN PREM HI PRSS TBNG AT

## (undated) DEVICE — SUTURE ABSORBABLE WND CLOSURE 4-0 P-12 12 IN UD V-LOC

## (undated) DEVICE — MICROPUNCTURE INTRODUCER SET SILHOUETTE TRANSITIONLESS WITH STAINLESS STEEL WIRE GUIDE: Brand: MICROPUNCTURE

## (undated) DEVICE — MEDI-TRACE CADENCE ADULT, DEFIBRILLATION ELECTRODE -RTS  (10 PR/PK) - PHYSIO-CONTROL: Brand: MEDI-TRACE CADENCE

## (undated) DEVICE — CATHETER DIAG 5FR L100CM LUMN ID0.047IN JR4 CRV 0 SIDE H

## (undated) DEVICE — WASTE KIT - ST MARY: Brand: MEDLINE INDUSTRIES, INC.

## (undated) DEVICE — Device

## (undated) DEVICE — CO-SET DELIVERY SYSTEM FOR 123 ROOM TEMPATURE INJECTATE: Brand: CO-SET+

## (undated) DEVICE — KIT MFLD ISOLATN NACL CNTRST PRT TBNG SPIK W/ PRSS TRNSDUC

## (undated) DEVICE — SYRINGE IRRIG 60ML SFT PLIABLE BLB EZ TO GRP 1 HND USE W/

## (undated) DEVICE — SUTURE ETHIBOND EXCEL SZ 2 L30IN NONABSORBABLE GRN L40MM V-37 MX69G

## (undated) DEVICE — PADPRO DEFIBRILLATION/PACING/CARDIOVERSION/MONITORING ELECTRODES, ADULT/CHILD GREATER THAN 10 KG RADIOTRANSPARENT ELECTRODE, PHYSIO-CONTROL QUIK-COMBO (M) 60" (152 CM): Brand: PADPRO

## (undated) DEVICE — PLASMABLADE X PS210-030S-LIGHT 3.0SL: Brand: PLASMABLADE™ X

## (undated) DEVICE — ELECTRODE,RADIOTRANSLUCENT,FOAM,5PK: Brand: MEDLINE

## (undated) DEVICE — 6 FOOT DISPOSABLE EXTENSION CABLE WITH SAFE CONNECT / SCREW-DOWN

## (undated) DEVICE — CATHETER PRESSURE WEDGE BLLN 6FRX110CM

## (undated) DEVICE — HANDLE LT SNAP ON ULT DURABLE LENS FOR TRUMPF ALC DISPOSABLE

## (undated) DEVICE — CATHETER DIAG 5FR L100CM AL AL 2.0 CRV SZ DBL BRAID WIRE

## (undated) DEVICE — AGENT HEMSTAT 3GM PURIFIED PLNT STARCH PWD ABSRB ARISTA AH

## (undated) DEVICE — SYSTEM INTRO 9.5FR L13CM STD WHT CAP HEMSTAT SPLITTABLE

## (undated) DEVICE — TR BAND RADIAL ARTERY COMPRESSION DEVICE: Brand: TR BAND

## (undated) DEVICE — SUTURE V-LOC 90 SZ 3-0 L6IN ABSRB VLT V-20 L26MM 1/2 CIR VLOCM0604

## (undated) DEVICE — INTRODUCER SHTH L13CM OD7FR SH ORNG HUB SEAMLESS SAFSHTH

## (undated) DEVICE — INTRODUCER SPLIT SHEATH PRELUDE SNAP 9FR X 13CM

## (undated) DEVICE — SYRINGE 20ML LL S/C 50

## (undated) DEVICE — SPECIAL PROCEDURE DRAPE 32" X 34": Brand: SPECIAL PROCEDURE DRAPE